# Patient Record
Sex: FEMALE | Race: WHITE | NOT HISPANIC OR LATINO | Employment: FULL TIME | URBAN - METROPOLITAN AREA
[De-identification: names, ages, dates, MRNs, and addresses within clinical notes are randomized per-mention and may not be internally consistent; named-entity substitution may affect disease eponyms.]

---

## 2017-01-06 ENCOUNTER — HOSPITAL ENCOUNTER (OUTPATIENT)
Dept: ULTRASOUND IMAGING | Facility: HOSPITAL | Age: 37
Discharge: HOME | End: 2017-01-06
Attending: FAMILY MEDICINE | Admitting: FAMILY MEDICINE

## 2017-01-06 DIAGNOSIS — E04.2 MULTIPLE THYROID NODULES: ICD-10-CM

## 2017-01-06 PROCEDURE — 76536 US EXAM OF HEAD AND NECK: CPT

## 2017-03-17 ENCOUNTER — OFFICE VISIT (OUTPATIENT)
Dept: FAMILY MEDICINE CLINIC | Facility: CLINIC | Age: 37
End: 2017-03-17

## 2017-03-17 VITALS
HEIGHT: 68 IN | RESPIRATION RATE: 18 BRPM | HEART RATE: 88 BPM | WEIGHT: 193 LBS | TEMPERATURE: 98.7 F | OXYGEN SATURATION: 98 % | SYSTOLIC BLOOD PRESSURE: 108 MMHG | BODY MASS INDEX: 29.25 KG/M2 | DIASTOLIC BLOOD PRESSURE: 62 MMHG

## 2017-03-17 DIAGNOSIS — J06.9 UPPER RESPIRATORY TRACT INFECTION, UNSPECIFIED TYPE: Primary | ICD-10-CM

## 2017-03-17 PROCEDURE — 99213 OFFICE O/P EST LOW 20 MIN: CPT | Performed by: NURSE PRACTITIONER

## 2017-03-17 RX ORDER — AZITHROMYCIN 250 MG/1
TABLET, FILM COATED ORAL
Qty: 6 TABLET | Refills: 0 | Status: SHIPPED | OUTPATIENT
Start: 2017-03-17 | End: 2017-05-26

## 2017-03-17 NOTE — PROGRESS NOTES
Subjective   Ni Rodríguez is a 36 y.o. female.     Chief Complaint   Patient presents with   • URI     x 3 weeks, cough, congestion, t/j pain, fatigue, drainage     Social History   Substance Use Topics   • Smoking status: Never Smoker   • Smokeless tobacco: Not on file   • Alcohol use Yes       URI    This is a new problem. The current episode started 1 to 4 weeks ago (3 weeks). The problem has been unchanged. There has been no fever. Associated symptoms include congestion, coughing (productive-yellow), headaches, a plugged ear sensation, sinus pain (maxillary) and a sore throat. Pertinent negatives include no abdominal pain, ear pain, nausea, vomiting or wheezing. Associated symptoms comments: Teeth hurt  . She has tried decongestant and acetaminophen for the symptoms. The treatment provided mild relief.     Review of Systems   Constitutional: Positive for fatigue. Negative for chills and fever.   HENT: Positive for congestion, sinus pressure and sore throat. Negative for ear pain.    Respiratory: Positive for cough (productive-yellow) and shortness of breath (winded easily). Negative for wheezing.    Gastrointestinal: Negative for abdominal pain, nausea and vomiting.   Neurological: Positive for headaches.   All other systems reviewed and are negative.    Physical Exam   Constitutional: She is oriented to person, place, and time. She appears well-developed and well-nourished.   HENT:   Head: Normocephalic and atraumatic.   Right Ear: External ear normal. No tenderness. Tympanic membrane is bulging. Tympanic membrane is not erythematous.   Left Ear: External ear normal. No tenderness. Tympanic membrane is bulging. Tympanic membrane is not erythematous.   Mouth/Throat: Oropharynx is clear and moist.   Neck: Normal range of motion. Neck supple.   Cardiovascular: Normal rate, regular rhythm and normal heart sounds.    Pulmonary/Chest: Effort normal and breath sounds normal.   Musculoskeletal: Normal range of motion.  "  Neurological: She is alert and oriented to person, place, and time.   Skin: Skin is warm and dry.   Psychiatric: She has a normal mood and affect. Her behavior is normal. Judgment and thought content normal.   Nursing note and vitals reviewed.       The following portions of the patient's history were reviewed and updated as appropriate: allergies, current medications,past medical hx, family hx, past social history an...    Chief Complaint   Patient presents with   • URI     x 3 weeks, cough, congestion, t/j pain, fatigue, drainage       Vitals:    03/17/17 1600   BP: 108/62   BP Location: Left arm   Patient Position: Sitting   Cuff Size: Adult   Pulse: 88   Resp: 18   Temp: 98.7 °F (37.1 °C)   SpO2: 98%   Weight: 193 lb (87.5 kg)   Height: 68\" (172.7 cm)       Assessment/Plan   Problems Addressed this Visit     None      Visit Diagnoses     Upper respiratory tract infection, unspecified type    -  Primary    Relevant Medications    azithromycin (ZITHROMAX) 250 MG tablet               Tylenol or Advil as needed for pain, fever, muscle aches  Plenty of fluids  Hand washing discussed  Off work or school note given if needed.  Warm tea for throat.      [facility]  Family Practice  [place], [place]  [facility]  "

## 2017-04-04 DIAGNOSIS — J40 BRONCHITIS: ICD-10-CM

## 2017-05-26 ENCOUNTER — OFFICE VISIT (OUTPATIENT)
Dept: FAMILY MEDICINE CLINIC | Facility: CLINIC | Age: 37
End: 2017-05-26

## 2017-05-26 VITALS
BODY MASS INDEX: 29.55 KG/M2 | RESPIRATION RATE: 18 BRPM | HEIGHT: 68 IN | SYSTOLIC BLOOD PRESSURE: 112 MMHG | WEIGHT: 195 LBS | HEART RATE: 95 BPM | DIASTOLIC BLOOD PRESSURE: 70 MMHG | OXYGEN SATURATION: 98 % | TEMPERATURE: 98.2 F

## 2017-05-26 DIAGNOSIS — J06.9 ACUTE URI: ICD-10-CM

## 2017-05-26 DIAGNOSIS — J02.9 SORE THROAT: Primary | ICD-10-CM

## 2017-05-26 LAB
EXPIRATION DATE: NORMAL
INTERNAL CONTROL: NORMAL
Lab: NORMAL
S PYO AG THROAT QL: NEGATIVE

## 2017-05-26 PROCEDURE — 99213 OFFICE O/P EST LOW 20 MIN: CPT | Performed by: NURSE PRACTITIONER

## 2017-05-26 PROCEDURE — 87880 STREP A ASSAY W/OPTIC: CPT | Performed by: NURSE PRACTITIONER

## 2017-05-26 RX ORDER — AMOXICILLIN AND CLAVULANATE POTASSIUM 875; 125 MG/1; MG/1
1 TABLET, FILM COATED ORAL 2 TIMES DAILY
Qty: 14 TABLET | Refills: 0 | Status: SHIPPED | OUTPATIENT
Start: 2017-05-26 | End: 2017-06-02

## 2017-08-14 ENCOUNTER — OFFICE VISIT (OUTPATIENT)
Dept: FAMILY MEDICINE CLINIC | Facility: CLINIC | Age: 37
End: 2017-08-14

## 2017-08-14 VITALS
OXYGEN SATURATION: 99 % | HEART RATE: 100 BPM | DIASTOLIC BLOOD PRESSURE: 74 MMHG | WEIGHT: 194.9 LBS | HEIGHT: 68 IN | TEMPERATURE: 98.3 F | BODY MASS INDEX: 29.54 KG/M2 | SYSTOLIC BLOOD PRESSURE: 118 MMHG

## 2017-08-14 DIAGNOSIS — R53.82 CHRONIC FATIGUE: ICD-10-CM

## 2017-08-14 DIAGNOSIS — E03.9 ACQUIRED HYPOTHYROIDISM: Primary | ICD-10-CM

## 2017-08-14 PROCEDURE — 99213 OFFICE O/P EST LOW 20 MIN: CPT | Performed by: FAMILY MEDICINE

## 2017-08-14 NOTE — PROGRESS NOTES
Subjective   Ni Rodríguez is a 37 y.o. female who is here for   Chief Complaint   Patient presents with   • Follow-up   • Hypothyroidism   • Fatigue   .     History of Present Illness   Hypothyroidism: Patient presents for evaluation of thyroid function. Symptoms consist of fatigue, weight gain. Symptoms have present for several months. The symptoms are mild.  The problem has been gradually worsening.  Previous thyroid studies include TSH and total T4. The hypothyroidism is due to hypothyroidism.  Newly Ds last yr. Her mother and brother with same.  She is taking a fruit/veggie supplement which also flushes colon.        The following portions of the patient's history were reviewed and updated as appropriate: allergies, current medications, past family history, past medical history, past social history, past surgical history and problem list.    Review of Systems    Objective   Physical Exam   Neck: No thyromegaly present.   Cardiovascular: Normal rate.    Pulmonary/Chest: Effort normal.   Nursing note and vitals reviewed.      Assessment/Plan   Ni was seen today for follow-up, hypothyroidism and fatigue.    Diagnoses and all orders for this visit:    Acquired hypothyroidism  -     TSH  -     T4, Free    Chronic fatigue  -     CBC (No Diff)  -     Comprehensive Metabolic Panel      There are no Patient Instructions on file for this visit.    There are no discontinued medications.     Return if symptoms worsen or fail to improve.    Dr. David Naylor  [place], [place]

## 2017-08-15 LAB
ALBUMIN SERPL-MCNC: 3.8 G/DL (ref 3.5–5.2)
ALBUMIN/GLOB SERPL: 1.1 G/DL
ALP SERPL-CCNC: 51 U/L (ref 40–129)
ALT SERPL-CCNC: 16 U/L (ref 5–33)
AST SERPL-CCNC: 17 U/L (ref 5–32)
BILIRUB SERPL-MCNC: 0.2 MG/DL (ref 0.2–1.2)
BUN SERPL-MCNC: 10 MG/DL (ref 6–20)
BUN/CREAT SERPL: 12.2 (ref 7–25)
CALCIUM SERPL-MCNC: 9.2 MG/DL (ref 8.6–10.5)
CHLORIDE SERPL-SCNC: 102 MMOL/L (ref 98–107)
CO2 SERPL-SCNC: 24.8 MMOL/L (ref 22–29)
CREAT SERPL-MCNC: 0.82 MG/DL (ref 0.57–1)
ERYTHROCYTE [DISTWIDTH] IN BLOOD BY AUTOMATED COUNT: 12.7 % (ref 11.5–14.5)
GLOBULIN SER CALC-MCNC: 3.4 GM/DL
GLUCOSE SERPL-MCNC: 131 MG/DL (ref 65–99)
HCT VFR BLD AUTO: 44.4 % (ref 37–47)
HGB BLD-MCNC: 14.2 G/DL (ref 12–16)
MCH RBC QN AUTO: 27.8 PG (ref 27–31)
MCHC RBC AUTO-ENTMCNC: 32 G/DL (ref 31–37)
MCV RBC AUTO: 86.9 FL (ref 81–99)
PLATELET # BLD AUTO: 246 10*3/MM3 (ref 140–500)
POTASSIUM SERPL-SCNC: 3.9 MMOL/L (ref 3.5–5.2)
PROT SERPL-MCNC: 7.2 G/DL (ref 6–8.5)
RBC # BLD AUTO: 5.11 10*6/MM3 (ref 4.2–5.4)
SODIUM SERPL-SCNC: 139 MMOL/L (ref 136–145)
T4 FREE SERPL-MCNC: 1.1 NG/DL (ref 0.93–1.7)
TSH SERPL DL<=0.005 MIU/L-ACNC: 1.06 MIU/ML (ref 0.27–4.2)
WBC # BLD AUTO: 9.43 10*3/MM3 (ref 4.8–10.8)

## 2017-10-06 ENCOUNTER — OFFICE VISIT (OUTPATIENT)
Dept: FAMILY MEDICINE CLINIC | Facility: CLINIC | Age: 37
End: 2017-10-06

## 2017-10-06 VITALS
RESPIRATION RATE: 18 BRPM | HEIGHT: 68 IN | WEIGHT: 194 LBS | SYSTOLIC BLOOD PRESSURE: 97 MMHG | DIASTOLIC BLOOD PRESSURE: 71 MMHG | HEART RATE: 94 BPM | BODY MASS INDEX: 29.4 KG/M2 | OXYGEN SATURATION: 98 %

## 2017-10-06 DIAGNOSIS — J40 BRONCHITIS: Primary | ICD-10-CM

## 2017-10-06 PROCEDURE — 99213 OFFICE O/P EST LOW 20 MIN: CPT | Performed by: NURSE PRACTITIONER

## 2017-10-06 RX ORDER — METHYLPREDNISOLONE 4 MG/1
TABLET ORAL
Qty: 21 EACH | Refills: 0 | Status: SHIPPED | OUTPATIENT
Start: 2017-10-06 | End: 2018-03-30

## 2017-10-06 RX ORDER — AZITHROMYCIN 250 MG/1
TABLET, FILM COATED ORAL
Qty: 6 TABLET | Refills: 0 | Status: SHIPPED | OUTPATIENT
Start: 2017-10-06 | End: 2018-03-30

## 2017-10-06 NOTE — PROGRESS NOTES
"Lisa Rodríguez is a 37 y.o. female.     Chief Complaint   Patient presents with   • URI     started with a sore throat, painful cough, chest fullness, non-productive cough     Social History   Substance Use Topics   • Smoking status: Never Smoker   • Smokeless tobacco: Never Used   • Alcohol use 0.6 oz/week     1 Glasses of wine per week       Cough   This is a new problem. The current episode started in the past 7 days (4-5). The problem has been gradually worsening. The problem occurs every few minutes. The cough is non-productive. Associated symptoms include headaches, a sore throat (worse in morning and night) and wheezing. Pertinent negatives include no chills, ear congestion, fever, nasal congestion, rhinorrhea or shortness of breath.     Review of Systems   Constitutional: Positive for fatigue. Negative for chills and fever.   HENT: Positive for sore throat (worse in morning and night). Negative for rhinorrhea and sinus pressure.    Respiratory: Positive for cough, chest tightness and wheezing. Negative for shortness of breath.    Skin: Negative.    Neurological: Positive for headaches.   All other systems reviewed and are negative.    Physical Exam   Gen: mildly ill appearing, alert  Ears: Tm's bulging without redness  Nose:  Congestion  Throat:  Red without exudate, some drainage, tonsils okay  Neck: no LAD  Lung: good air movement, regular RR  Heart: RR without murmur  Skin: no rash.    The following portions of the patient's history were reviewed and updated as appropriate: allergies, current medications,past medical hx, family hx, past social history an...    Chief Complaint   Patient presents with   • URI     started with a sore throat, painful cough, chest fullness, non-productive cough     cough    Vitals:    10/06/17 1551   BP: 97/71   BP Location: Left arm   Patient Position: Sitting   Cuff Size: Adult   Pulse: 94   Resp: 18   SpO2: 98%   Weight: 194 lb (88 kg)   Height: 68\" (172.7 cm) "         Assessment/Plan   Problems Addressed this Visit     None      Visit Diagnoses     Bronchitis    -  Primary    Relevant Medications    MethylPREDNISolone (MEDROL, BRADLY,) 4 MG tablet    azithromycin (ZITHROMAX) 250 MG tablet               Tylenol or Advil as needed for pain, fever, muscle aches  Plenty of fluids  Hand washing discussed  Off work or school note given if needed.  Warm tea for throat.      Lety DOE  Family Practice  [place], [place]  [facility]

## 2018-02-01 DIAGNOSIS — Z20.828 EXPOSURE TO THE FLU: Primary | ICD-10-CM

## 2018-02-01 RX ORDER — OSELTAMIVIR PHOSPHATE 75 MG/1
75 CAPSULE ORAL DAILY
Qty: 10 CAPSULE | Refills: 0 | Status: SHIPPED | OUTPATIENT
Start: 2018-02-01 | End: 2018-03-30

## 2018-03-03 DIAGNOSIS — E03.9 ACQUIRED HYPOTHYROIDISM: ICD-10-CM

## 2018-03-05 RX ORDER — LEVOTHYROXINE SODIUM 0.05 MG/1
TABLET ORAL
Qty: 90 TABLET | Refills: 0 | Status: SHIPPED | OUTPATIENT
Start: 2018-03-05 | End: 2018-06-01

## 2018-03-30 ENCOUNTER — OFFICE VISIT (OUTPATIENT)
Dept: FAMILY MEDICINE CLINIC | Facility: CLINIC | Age: 38
End: 2018-03-30

## 2018-03-30 VITALS
SYSTOLIC BLOOD PRESSURE: 110 MMHG | DIASTOLIC BLOOD PRESSURE: 72 MMHG | TEMPERATURE: 98.8 F | HEIGHT: 68 IN | HEART RATE: 96 BPM | WEIGHT: 198.8 LBS | BODY MASS INDEX: 30.13 KG/M2 | OXYGEN SATURATION: 98 %

## 2018-03-30 DIAGNOSIS — E03.9 ACQUIRED HYPOTHYROIDISM: Primary | ICD-10-CM

## 2018-03-30 LAB
T3 SERPL-MCNC: 159.4 NG/DL (ref 80–200)
T4 FREE SERPL-MCNC: 1.24 NG/DL (ref 0.93–1.7)
TSH SERPL DL<=0.005 MIU/L-ACNC: 3.19 MIU/ML (ref 0.27–4.2)

## 2018-03-30 PROCEDURE — 99213 OFFICE O/P EST LOW 20 MIN: CPT | Performed by: FAMILY MEDICINE

## 2018-03-30 RX ORDER — LEVOTHYROXINE SODIUM 50 MCG
50 TABLET ORAL DAILY
Qty: 30 TABLET | Refills: 5 | Status: SHIPPED | OUTPATIENT
Start: 2018-03-30 | End: 2018-09-25 | Stop reason: SDUPTHER

## 2018-03-30 NOTE — PROGRESS NOTES
Subjective   Ni Rodríguez is a 37 y.o. female who is here for   Chief Complaint   Patient presents with   • Fatigue   • Weight Gain   .     History of Present Illness   Hypothyroidism: Patient presents for evaluation of thyroid function. Symptoms consist of fatigue, weight gain, change in skin,  nails, or hair. Symptoms have present for 10 years. The symptoms are moderate.  The problem has been unchanged.  Previous thyroid studies include TSH and total T4. The hypothyroidism is due to hypothyroidism.  Despite normal TSH levels , still has daily fatigue and can not lose weight  Lets try brand Synthroid        The following portions of the patient's history were reviewed and updated as appropriate: allergies, current medications, past family history, past medical history, past social history and problem list.    Review of Systems    Objective   Physical Exam   Constitutional: She appears well-developed and well-nourished.   Neck: No thyromegaly present.   Cardiovascular: Normal rate.    Pulmonary/Chest: Effort normal.   Nursing note and vitals reviewed.      Assessment/Plan   Ni was seen today for fatigue and weight gain.    Diagnoses and all orders for this visit:    Acquired hypothyroidism  -     TSH  -     T4, Free  -     T3    Other orders  -     SYNTHROID 50 MCG tablet; Take 1 tablet by mouth Daily.      There are no Patient Instructions on file for this visit.    Medications Discontinued During This Encounter   Medication Reason   • azithromycin (ZITHROMAX) 250 MG tablet *Therapy completed   • MethylPREDNISolone (MEDROL, BRADLY,) 4 MG tablet *Therapy completed   • montelukast (SINGULAIR) 10 MG tablet *Therapy completed   • oseltamivir (TAMIFLU) 75 MG capsule *Therapy completed   • PROAIR  (90 BASE) MCG/ACT inhaler *Therapy completed        Return for Next scheduled follow up.    Dr. David Naylor  [place], [place]

## 2018-06-01 ENCOUNTER — OFFICE VISIT (OUTPATIENT)
Dept: FAMILY MEDICINE CLINIC | Facility: CLINIC | Age: 38
End: 2018-06-01

## 2018-06-01 VITALS
HEART RATE: 94 BPM | DIASTOLIC BLOOD PRESSURE: 70 MMHG | HEIGHT: 68 IN | WEIGHT: 195 LBS | OXYGEN SATURATION: 99 % | SYSTOLIC BLOOD PRESSURE: 120 MMHG | BODY MASS INDEX: 29.55 KG/M2 | TEMPERATURE: 97.6 F

## 2018-06-01 DIAGNOSIS — Z00.00 ROUTINE ADULT HEALTH MAINTENANCE: ICD-10-CM

## 2018-06-01 DIAGNOSIS — E03.9 ACQUIRED HYPOTHYROIDISM: Primary | ICD-10-CM

## 2018-06-01 PROCEDURE — 99395 PREV VISIT EST AGE 18-39: CPT | Performed by: FAMILY MEDICINE

## 2018-06-01 NOTE — PROGRESS NOTES
"  Chief Complaint   Patient presents with   • Annual Exam   • Circulatory Problem     in her legs been going on since Jan, Febr   • Hypothyroidism       Subjective   Ni Rodríguez is a 37 y.o. female and is here for a yearly physical exam. The patient reports problems - feet tingle pins and needles after sitting. finger tips get cold too.    We tried brand name Synthroid last ov for fatigue despite normal treated TSH levels.    Do you take any herbs or supplements that were not prescribed by a doctor? no. If so, these will be added to active medication list.    The following portions of the patient's history were reviewed and updated as appropriate: allergies, current medications, past family history, past medical history, past social history, past surgical history and problem list.    Social and Family and Surgical History reviewed and updated today, see Rooming tab.    Health History, Preventive Measures and Vaccination flow sheets reviewed and updated today.    Patient's current medical chart in Epic; including previous office notes, imaging, labs, specialist's evaluation either in notes or in Media tab reviewed today.    Other pertinent medical information also reviewed thru Care Everywhere function is also reviewed today.    Review of Systems  Review of Systems  A comprehensive review of systems was negative.    Vitals:    06/01/18 1303   BP: 120/70   BP Location: Left arm   Patient Position: Sitting   Pulse: 94   Temp: 97.6 °F (36.4 °C)   TempSrc: Oral   SpO2: 99%   Weight: 88.5 kg (195 lb)   Height: 172.7 cm (68\")       General Appearance:  Alert, cooperative, no distress, appears stated age   Head:  Normocephalic, without obvious abnormality, atraumatic   Eyes:  PERRL, conjunctiva/corneas clear, EOM's intact.   Ears:  Normal TM's and external ear canals, both ears   Nose: Nares normal, septum midline, mucosa normal, no drainage or sinus tenderness   Throat: Lips, mucosa, and tongue normal; teeth and gums " normal   Neck: Supple, symmetrical, trachea midline, no adenopathy;   thyroid: No enlargement/tenderness/nodules; no carotid  bruit   Back:  Symmetric, no curvature, ROM normal, no CVA tenderness   Lungs:  Clear to auscultation bilaterally, respirations unlabored   Chest wall:  No tenderness or deformity   Heart:  Regular rate and rhythm, S1 and S2 normal, no murmur, rub or gallop   Abdomen:  Soft, non-tender, bowel sounds active all four quadrants,   no masses, no organomegaly   Rectal:        Extremities: Extremities normal, atraumatic, no cyanosis or edema   Pulses: 2+ and symmetric all extremities   Skin: Skin color, texture, turgor normal, no rashes or lesions   Lymph nodes: Cervical, supraclavicular, and axillary nodes normal   Neurologic: CNII-XII intact. Normal strength, sensation and reflexes   throughout          Results for orders placed or performed in visit on 03/30/18   TSH   Result Value Ref Range    TSH 3.190 0.270 - 4.200 mIU/mL   T4, Free   Result Value Ref Range    Free T4 1.24 0.93 - 1.70 ng/dL   T3   Result Value Ref Range    T3, Total 159.4 80.0 - 200.0 ng/dl     Assessment/Plan   Healthy female exam.  Ni was seen today for annual exam, circulatory problem and hypothyroidism.    Diagnoses and all orders for this visit:    Acquired hypothyroidism  -     TSH; Future  -     T4, Free; Future    Routine adult health maintenance      1. Will stick with brand name Synthroid  2. Patient Counseling:  --Nutrition: Stressed importance of moderation in sodium/caffeine intake, saturated fat and cholesterol.  Discussed caloric balance, sufficient intake of fresh fruits, vegetables, fiber, calcium, iron.ok  --  --Exercise: Stressed the importance of regular exercise. restart  --Substance Abuse: Discussed cessation/primary prevention of tobacco, alcohol, or other drug use; driving or other dangerous activities under the influence. ok   --Dental health: Discussed importance of regular tooth brushing,  flossing, and dental visits.utd  -- suggested having eyes and vision checked if needed or past due.utd , wears contacts  --Immunizations reviewed.ok  --current with GYN  3. Discussed the patient's BMI with her.  The BMI is above average; BMI management plan is completed  4. Follow up in 6 months    There are no Patient Instructions on file for this visit.    Medications Discontinued During This Encounter   Medication Reason   • levothyroxine (SYNTHROID, LEVOTHROID) 50 MCG tablet *Therapy completed        Dr. David Naylor MD  [place], [place]  [facility]

## 2018-09-26 RX ORDER — LEVOTHYROXINE SODIUM 50 MCG
50 TABLET ORAL DAILY
Qty: 90 TABLET | Refills: 0 | Status: SHIPPED | OUTPATIENT
Start: 2018-09-26 | End: 2018-12-13 | Stop reason: SDUPTHER

## 2018-10-15 ENCOUNTER — TELEPHONE (OUTPATIENT)
Dept: FAMILY MEDICINE CLINIC | Facility: CLINIC | Age: 38
End: 2018-10-15

## 2018-11-01 ENCOUNTER — RESULTS ENCOUNTER (OUTPATIENT)
Dept: FAMILY MEDICINE CLINIC | Facility: CLINIC | Age: 38
End: 2018-11-01

## 2018-11-01 DIAGNOSIS — E03.9 ACQUIRED HYPOTHYROIDISM: ICD-10-CM

## 2018-12-13 RX ORDER — LEVOTHYROXINE SODIUM 50 MCG
TABLET ORAL
Qty: 90 TABLET | Refills: 0 | Status: SHIPPED | OUTPATIENT
Start: 2018-12-13 | End: 2018-12-26 | Stop reason: SDUPTHER

## 2018-12-24 DIAGNOSIS — E03.9 ACQUIRED HYPOTHYROIDISM: Primary | ICD-10-CM

## 2018-12-26 RX ORDER — LEVOTHYROXINE SODIUM 50 MCG
50 TABLET ORAL DAILY
Qty: 90 TABLET | Refills: 0 | Status: SHIPPED | OUTPATIENT
Start: 2018-12-26 | End: 2019-06-02 | Stop reason: SDUPTHER

## 2018-12-27 LAB — TSH SERPL DL<=0.005 MIU/L-ACNC: 1.34 MIU/ML (ref 0.27–4.2)

## 2019-01-02 ENCOUNTER — OFFICE VISIT (OUTPATIENT)
Dept: FAMILY MEDICINE CLINIC | Facility: CLINIC | Age: 39
End: 2019-01-02

## 2019-01-02 VITALS
RESPIRATION RATE: 16 BRPM | HEART RATE: 75 BPM | HEIGHT: 68 IN | DIASTOLIC BLOOD PRESSURE: 70 MMHG | WEIGHT: 190 LBS | SYSTOLIC BLOOD PRESSURE: 118 MMHG | OXYGEN SATURATION: 99 % | TEMPERATURE: 98.6 F | BODY MASS INDEX: 28.79 KG/M2

## 2019-01-02 DIAGNOSIS — Z13.220 SCREENING FOR LIPID DISORDERS: ICD-10-CM

## 2019-01-02 DIAGNOSIS — Z23 NEED FOR INFLUENZA VACCINATION: ICD-10-CM

## 2019-01-02 DIAGNOSIS — R53.82 CHRONIC FATIGUE: Primary | ICD-10-CM

## 2019-01-02 DIAGNOSIS — E03.9 ACQUIRED HYPOTHYROIDISM: ICD-10-CM

## 2019-01-02 PROBLEM — M22.2X1 PATELLOFEMORAL PAIN SYNDROME OF RIGHT KNEE: Status: ACTIVE | Noted: 2018-09-20

## 2019-01-02 PROCEDURE — 90471 IMMUNIZATION ADMIN: CPT | Performed by: PHYSICIAN ASSISTANT

## 2019-01-02 PROCEDURE — 90674 CCIIV4 VAC NO PRSV 0.5 ML IM: CPT | Performed by: PHYSICIAN ASSISTANT

## 2019-01-02 PROCEDURE — 99213 OFFICE O/P EST LOW 20 MIN: CPT | Performed by: PHYSICIAN ASSISTANT

## 2019-01-02 NOTE — PROGRESS NOTES
Subjective   Ni Rodríguez is a 38 y.o. female presents for   Chief Complaint   Patient presents with   • Hypothyroidism     management       History of Present Illness     Ni is a 38-year-old female who presents for hypothyroidism management.  She has lost 5 pounds since June 1, 2018. She has lost 5 pounds since June 2018.  She is now taking name brand Synthroid daily.  Diet has been healthy.  Sleep has been normal.  She has been exercising 2-4 times a week.  She takes Juice Plus daily.  Ni states that she has had chronic fatigue symptoms for more than 6 months.  Not any chest pain, shortness of air, wheezing, fevers, chills, abdominal pain or swelling of ankles.  She doesn't snore or have sleep apnea. Ni sees Dr. Lorenz for her gyn needs. Bowel movements have been daily without dark black tarry stools.  Ni states she would like to have the flu shot at office visit today.  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  Denied any fevers, chills, or upper respiratory symptoms.     The following portions of the patient's history were reviewed and updated as appropriate: allergies, current medications, past family history, past medical history, past social history, past surgical history and problem list.    Review of Systems   Constitutional: Positive for fatigue. Negative for chills and fever.   HENT: Negative.    Eyes: Negative.    Respiratory: Negative.  Negative for cough, shortness of breath and wheezing.    Cardiovascular: Negative.  Negative for chest pain, palpitations and leg swelling.   Gastrointestinal: Negative.    Endocrine: Negative.    Genitourinary: Negative.    Musculoskeletal: Negative.    Skin: Negative.    Allergic/Immunologic: Negative.    Neurological: Negative.    Hematological: Negative.    Psychiatric/Behavioral: Negative.  Negative for sleep disturbance and suicidal ideas.   All other systems reviewed and are negative.        Vitals:    01/02/19 1436   BP:  "118/70   BP Location: Left arm   Patient Position: Sitting   Cuff Size: Adult   Pulse: 75   Resp: 16   Temp: 98.6 °F (37 °C)   TempSrc: Oral   SpO2: 99%   Weight: 86.2 kg (190 lb)   Height: 172.7 cm (68\")     Wt Readings from Last 3 Encounters:   01/02/19 86.2 kg (190 lb)   06/01/18 88.5 kg (195 lb)   03/30/18 90.2 kg (198 lb 12.8 oz)       BP Readings from Last 3 Encounters:   01/02/19 118/70   06/01/18 120/70   03/30/18 110/72     Social History     Socioeconomic History   • Marital status:      Spouse name: Not on file   • Number of children: 2   • Years of education: masters in education   • Highest education level: Not on file   Social Needs   • Financial resource strain: Not on file   • Food insecurity - worry: Not on file   • Food insecurity - inability: Not on file   • Transportation needs - medical: Not on file   • Transportation needs - non-medical: Not on file   Occupational History   • Occupation: teacher     Employer: [employer]   Tobacco Use   • Smoking status: Never Smoker   • Smokeless tobacco: Never Used   Substance and Sexual Activity   • Alcohol use: Yes     Alcohol/week: 0.6 oz     Types: 1 Glasses of wine per week   • Drug use: No   • Sexual activity: Yes     Partners: Male     Birth control/protection: OCP   Other Topics Concern   • Not on file   Social History Narrative   • Not on file       No Known Allergies    Body mass index is 28.89 kg/m².    Objective   Physical Exam   Constitutional: She is oriented to person, place, and time. Vital signs are normal. She appears well-developed and well-nourished.   HENT:   Head: Normocephalic and atraumatic.   Right Ear: Hearing, tympanic membrane, external ear and ear canal normal.   Left Ear: Hearing, tympanic membrane, external ear and ear canal normal.   Nose: Nose normal. Right sinus exhibits no maxillary sinus tenderness and no frontal sinus tenderness. Left sinus exhibits no maxillary sinus tenderness and no frontal sinus tenderness. "   Mouth/Throat: Uvula is midline, oropharynx is clear and moist and mucous membranes are normal.   Eyes: Conjunctivae, EOM and lids are normal. Pupils are equal, round, and reactive to light.   Neck: Trachea normal and phonation normal. Neck supple. No edema present. No thyroid mass and no thyromegaly present.   Cardiovascular: Normal rate, regular rhythm, S1 normal, S2 normal, normal heart sounds and normal pulses.   Pulmonary/Chest: Effort normal and breath sounds normal.   Abdominal: Soft. Normal appearance, normal aorta and bowel sounds are normal. There is no hepatomegaly. There is no tenderness.   Lymphadenopathy:     She has no cervical adenopathy.   Neurological: She is alert and oriented to person, place, and time.   Skin: Skin is warm, dry and intact. Capillary refill takes less than 2 seconds.   Psychiatric: She has a normal mood and affect. Her speech is normal and behavior is normal. Judgment and thought content normal. Cognition and memory are normal.       Assessment/Plan   Ni was seen today for hypothyroidism.    Diagnoses and all orders for this visit:    Chronic fatigue  -     CBC & Differential; Future  -     Comprehensive Metabolic Panel; Future  -     Vitamin D 25 Hydroxy; Future  -     Vitamin B12; Future  -     Folate; Future  -     Fabiola-Barr Virus VCA Antibody Panel; Future    Acquired hypothyroidism    Need for influenza vaccination  -     Flucelvax Quad=>4Years (Vial)    Screening for lipid disorders  -     Lipid Panel With LDL / HDL Ratio; Future      1.  Chronic and stable hypothyroidism: I have reviewed her TSH results with her at office visit today.  She is in therapeutic range at 1.34.  She will continue taking her name brand/medically necessary Synthroid medication at home.  Plan to repeat TSH in 3-6 months.  2.  New fatigue: Ni has been having fatigue symptoms for the past 9-10 months.  She'll return to office this week for fasting blood work that include CBC, CMP, vitamin D  level, vitamin B12, folate and Fabiola-Barr virus testing.  She'll be notified of test results when completed.  3.  Need for screening lipid profile: She has a family history of cholesterol issues.  She'll return to office this week for fasting lipid profile.  She'll be notified of test results when completed.  Ni was encouraged to decrease her fatty food intake and to increase physical activity.  4.  Need for influenza vaccination: Ni has given written consent to receive the flu immunization at office visit today.    Miri Deshpande PA-C    [facility]  [address] [place] [id number]  Dept: [phone]  Dept Fax: [phone]  Loc: [phone]  Loc Fax: [phone]        Orders Only on 12/24/2018   Component Date Value Ref Range Status   • TSH 12/27/2018 1.340  0.270 - 4.200 mIU/mL Final

## 2019-01-04 ENCOUNTER — RESULTS ENCOUNTER (OUTPATIENT)
Dept: FAMILY MEDICINE CLINIC | Facility: CLINIC | Age: 39
End: 2019-01-04

## 2019-01-04 DIAGNOSIS — Z13.220 SCREENING FOR LIPID DISORDERS: ICD-10-CM

## 2019-01-04 DIAGNOSIS — R53.82 CHRONIC FATIGUE: ICD-10-CM

## 2019-01-08 LAB
25(OH)D3+25(OH)D2 SERPL-MCNC: 53.2 NG/ML
ALBUMIN SERPL-MCNC: 4.3 G/DL (ref 3.5–5.2)
ALBUMIN/GLOB SERPL: 1.4 G/DL
ALP SERPL-CCNC: 60 U/L (ref 40–129)
ALT SERPL-CCNC: 11 U/L (ref 5–33)
AST SERPL-CCNC: 11 U/L (ref 5–32)
BASOPHILS # BLD AUTO: 0.04 10*3/MM3 (ref 0–0.2)
BASOPHILS NFR BLD AUTO: 0.4 % (ref 0–2)
BILIRUB SERPL-MCNC: 0.4 MG/DL (ref 0.2–1.2)
BUN SERPL-MCNC: 13 MG/DL (ref 6–20)
BUN/CREAT SERPL: 17.6 (ref 7–25)
CALCIUM SERPL-MCNC: 9.4 MG/DL (ref 8.6–10.5)
CHLORIDE SERPL-SCNC: 104 MMOL/L (ref 98–107)
CHOLEST SERPL-MCNC: 176 MG/DL (ref 0–200)
CO2 SERPL-SCNC: 24.8 MMOL/L (ref 22–29)
CREAT SERPL-MCNC: 0.74 MG/DL (ref 0.57–1)
EBV EA IGG SER-ACNC: 13.2 U/ML (ref 0–8.9)
EBV NA IGG SER IA-ACNC: 119 U/ML (ref 0–17.9)
EBV VCA IGG SER IA-ACNC: 76.5 U/ML (ref 0–17.9)
EBV VCA IGM SER IA-ACNC: <36 U/ML (ref 0–35.9)
EOSINOPHIL # BLD AUTO: 0.38 10*3/MM3 (ref 0.1–0.3)
EOSINOPHIL NFR BLD AUTO: 4.1 % (ref 0–4)
ERYTHROCYTE [DISTWIDTH] IN BLOOD BY AUTOMATED COUNT: 12.3 % (ref 11.5–14.5)
FOLATE SERPL-MCNC: 12.36 NG/ML (ref 4.78–20)
GLOBULIN SER CALC-MCNC: 3 GM/DL
GLUCOSE SERPL-MCNC: 84 MG/DL (ref 65–99)
HCT VFR BLD AUTO: 44 % (ref 37–47)
HDLC SERPL-MCNC: 73 MG/DL (ref 40–60)
HGB BLD-MCNC: 13.9 G/DL (ref 12–16)
IMM GRANULOCYTES # BLD AUTO: 0.04 10*3/MM3 (ref 0–0.03)
IMM GRANULOCYTES NFR BLD AUTO: 0.4 % (ref 0–0.5)
LDLC SERPL CALC-MCNC: 72 MG/DL (ref 0–100)
LDLC/HDLC SERPL: 0.99 {RATIO}
LYMPHOCYTES # BLD AUTO: 2.91 10*3/MM3 (ref 0.6–4.8)
LYMPHOCYTES NFR BLD AUTO: 31.1 % (ref 20–45)
MCH RBC QN AUTO: 28.6 PG (ref 27–31)
MCHC RBC AUTO-ENTMCNC: 31.6 G/DL (ref 31–37)
MCV RBC AUTO: 90.5 FL (ref 81–99)
MONOCYTES # BLD AUTO: 0.82 10*3/MM3 (ref 0–1)
MONOCYTES NFR BLD AUTO: 8.8 % (ref 3–8)
NEUTROPHILS # BLD AUTO: 5.17 10*3/MM3 (ref 1.5–8.3)
NEUTROPHILS NFR BLD AUTO: 55.2 % (ref 45–70)
NRBC BLD AUTO-RTO: 0 /100 WBC (ref 0–0)
PLATELET # BLD AUTO: 239 10*3/MM3 (ref 140–500)
POTASSIUM SERPL-SCNC: 4.7 MMOL/L (ref 3.5–5.2)
PROT SERPL-MCNC: 7.3 G/DL (ref 6–8.5)
RBC # BLD AUTO: 4.86 10*6/MM3 (ref 4.2–5.4)
SERVICE CMNT-IMP: ABNORMAL
SODIUM SERPL-SCNC: 141 MMOL/L (ref 136–145)
TRIGL SERPL-MCNC: 154 MG/DL (ref 0–150)
VIT B12 SERPL-MCNC: 227 PG/ML
VLDLC SERPL CALC-MCNC: 30.8 MG/DL (ref 7–27)
WBC # BLD AUTO: 9.36 10*3/MM3 (ref 4.8–10.8)

## 2019-03-29 ENCOUNTER — OFFICE VISIT (OUTPATIENT)
Dept: FAMILY MEDICINE CLINIC | Facility: CLINIC | Age: 39
End: 2019-03-29

## 2019-03-29 VITALS
TEMPERATURE: 98.2 F | OXYGEN SATURATION: 98 % | BODY MASS INDEX: 28.79 KG/M2 | WEIGHT: 190 LBS | DIASTOLIC BLOOD PRESSURE: 65 MMHG | SYSTOLIC BLOOD PRESSURE: 100 MMHG | RESPIRATION RATE: 16 BRPM | HEART RATE: 94 BPM | HEIGHT: 68 IN

## 2019-03-29 DIAGNOSIS — J20.9 ACUTE BRONCHITIS, UNSPECIFIED ORGANISM: ICD-10-CM

## 2019-03-29 DIAGNOSIS — J06.9 ACUTE URI: Primary | ICD-10-CM

## 2019-03-29 PROCEDURE — 99213 OFFICE O/P EST LOW 20 MIN: CPT | Performed by: PHYSICIAN ASSISTANT

## 2019-03-29 RX ORDER — METHYLPREDNISOLONE 4 MG/1
TABLET ORAL
Qty: 21 TABLET | Refills: 0 | Status: SHIPPED | OUTPATIENT
Start: 2019-03-29 | End: 2020-02-28

## 2019-03-29 RX ORDER — ALBUTEROL SULFATE 90 UG/1
2 AEROSOL, METERED RESPIRATORY (INHALATION) EVERY 4 HOURS PRN
Qty: 18 G | Refills: 0 | Status: SHIPPED | OUTPATIENT
Start: 2019-03-29 | End: 2020-03-29

## 2019-03-29 RX ORDER — AZITHROMYCIN 250 MG/1
TABLET, FILM COATED ORAL
Qty: 6 TABLET | Refills: 0 | Status: SHIPPED | OUTPATIENT
Start: 2019-03-29 | End: 2020-02-28

## 2019-03-29 NOTE — PROGRESS NOTES
Subjective   Ni Rodríguez is a 38 y.o. female presents for   Chief Complaint   Patient presents with   • Cough     x1 week   • Nasal Congestion   • Sore Throat       History of Present Illness     Ni is a 38-year-old female who presents with sore throat, nasal congestion,dry cough,sinus pressure,sneezing,fatigue,stuffy nose to clear rhinorrhea,ear pressure,and scratchy throat for the past 7-10 days.  Denied any fevers,chills,nausea,vomiting,diarrhea,shortness of air,or wheezing.  Ni has used OTC Nyquil and Mucinex/Nyquil with slight relief. She is leaving for [facility] on Sunday.  Appetite has been decreased.  Sleep has been normal with Nyquil.  Lat LMP was March 15,2019.    The following portions of the patient's history were reviewed and updated as appropriate: allergies, current medications, past family history, past medical history, past social history, past surgical history and problem list.    Review of Systems   Constitutional: Negative.  Negative for chills, fatigue and fever.   HENT: Positive for congestion, ear pain, postnasal drip, rhinorrhea, sinus pressure, sinus pain and sore throat.    Eyes: Negative.    Respiratory: Positive for cough. Negative for shortness of breath and wheezing.    Cardiovascular: Negative.  Negative for chest pain, palpitations and leg swelling.   Gastrointestinal: Negative.  Negative for constipation, diarrhea, nausea and vomiting.   Endocrine: Negative.    Genitourinary: Negative.    Musculoskeletal: Positive for myalgias.   Skin: Negative.    Allergic/Immunologic: Negative.    Neurological: Positive for headaches. Negative for dizziness and light-headedness.   Hematological: Negative.    Psychiatric/Behavioral: Negative.  Negative for sleep disturbance and suicidal ideas.   All other systems reviewed and are negative.        Vitals:    03/29/19 1416   BP: 100/65   Pulse: 94   Resp: 16   Temp: 98.2 °F (36.8 °C)   SpO2: 98%   Weight: 86.2 kg (190 lb)   Height: 172.7  "cm (68\")     Wt Readings from Last 3 Encounters:   03/29/19 86.2 kg (190 lb)   01/02/19 86.2 kg (190 lb)   06/01/18 88.5 kg (195 lb)     BP Readings from Last 3 Encounters:   03/29/19 100/65   01/02/19 118/70   06/01/18 120/70     Social History     Socioeconomic History   • Marital status:      Spouse name: Not on file   • Number of children: 2   • Years of education: masters in education   • Highest education level: Not on file   Occupational History   • Occupation: teacher     Employer: [employer]   Tobacco Use   • Smoking status: Never Smoker   • Smokeless tobacco: Never Used   Substance and Sexual Activity   • Alcohol use: Yes     Alcohol/week: 0.6 oz     Types: 1 Glasses of wine per week   • Drug use: No   • Sexual activity: Yes     Partners: Male     Birth control/protection: OCP       No Known Allergies    Body mass index is 28.89 kg/m².    Objective   Physical Exam   Constitutional: She is oriented to person, place, and time. Vital signs are normal. She appears well-developed and well-nourished.   HENT:   Head: Normocephalic and atraumatic.   Right Ear: Hearing, tympanic membrane, external ear and ear canal normal.   Left Ear: Hearing, external ear and ear canal normal. Tympanic membrane is bulging.   Nose: Rhinorrhea present. Right sinus exhibits no maxillary sinus tenderness and no frontal sinus tenderness. Left sinus exhibits no maxillary sinus tenderness and no frontal sinus tenderness.   Mouth/Throat: Uvula is midline, oropharynx is clear and moist and mucous membranes are normal.   Eyes: Conjunctivae, EOM and lids are normal. Pupils are equal, round, and reactive to light.   Neck: Trachea normal and phonation normal. Neck supple. No edema present.   Cardiovascular: Normal rate, regular rhythm, S1 normal, S2 normal, normal heart sounds and normal pulses.   Pulmonary/Chest: Effort normal. She has wheezes in the left upper field.   Abdominal: Soft. Normal appearance, normal aorta and bowel " sounds are normal. There is no hepatomegaly. There is no tenderness.   Lymphadenopathy:     She has cervical adenopathy (shotty).        Right cervical: Superficial cervical adenopathy present.        Left cervical: Superficial cervical adenopathy present.   Neurological: She is alert and oriented to person, place, and time.   Skin: Skin is warm, dry and intact. Capillary refill takes less than 2 seconds.   Psychiatric: She has a normal mood and affect. Her speech is normal and behavior is normal. Judgment and thought content normal. Cognition and memory are normal.       Assessment/Plan   Ni was seen today for cough, nasal congestion and sore throat.    Diagnoses and all orders for this visit:    Acute URI  -     azithromycin (ZITHROMAX Z-BRADLY) 250 MG tablet; Take 2 tablets the first day, then 1 tablet daily for 4 days.  -     methylPREDNISolone (MEDROL, BRADLY,) 4 MG tablet; Take as directed on package instructions.  -     albuterol sulfate  (90 Base) MCG/ACT inhaler; Inhale 2 puffs Every 4 (Four) Hours As Needed for Wheezing or Shortness of Air.    Acute bronchitis, unspecified organism  -     azithromycin (ZITHROMAX Z-BRADLY) 250 MG tablet; Take 2 tablets the first day, then 1 tablet daily for 4 days.  -     methylPREDNISolone (MEDROL, BRADLY,) 4 MG tablet; Take as directed on package instructions.  -     albuterol sulfate  (90 Base) MCG/ACT inhaler; Inhale 2 puffs Every 4 (Four) Hours As Needed for Wheezing or Shortness of Air.    Mrs. Ni Rodríguez was seen in office today and was diagnosed with new onset acute upper respiratory infection and bronchitis.  I prescribed a Z-Bradly, Medrol Dosepak and albuterol inhaler.  She may continue using her Mucinex and Nyquil at home.  Ni was instructed to increase fluid intake and rest as much as possible.      BELINDA Munoz [facility]  [address] [place] [id number]  Dept:  [phone]  Dept Fax: [phone]  Loc: [phone]  Loc Fax: [phone]

## 2019-06-03 RX ORDER — LEVOTHYROXINE SODIUM 50 MCG
50 TABLET ORAL DAILY
Qty: 90 TABLET | Refills: 2 | Status: SHIPPED | OUTPATIENT
Start: 2019-06-03 | End: 2020-03-16

## 2019-06-05 DIAGNOSIS — Z00.00 ROUTINE ADULT HEALTH MAINTENANCE: ICD-10-CM

## 2019-06-05 DIAGNOSIS — E03.9 ACQUIRED HYPOTHYROIDISM: Primary | ICD-10-CM

## 2019-06-05 DIAGNOSIS — E53.8 VITAMIN B 12 DEFICIENCY: ICD-10-CM

## 2019-06-05 DIAGNOSIS — Z13.220 SCREENING FOR LIPID DISORDERS: ICD-10-CM

## 2019-06-05 LAB
ALBUMIN SERPL-MCNC: 3.9 G/DL (ref 3.5–5.2)
ALBUMIN/GLOB SERPL: 1.4 G/DL
ALP SERPL-CCNC: 52 U/L (ref 39–117)
ALT SERPL-CCNC: 10 U/L (ref 1–33)
AST SERPL-CCNC: 12 U/L (ref 1–32)
BASOPHILS # BLD AUTO: 0.04 10*3/MM3 (ref 0–0.2)
BASOPHILS NFR BLD AUTO: 0.6 % (ref 0–1.5)
BILIRUB SERPL-MCNC: 0.3 MG/DL (ref 0.2–1.2)
BUN SERPL-MCNC: 11 MG/DL (ref 6–20)
BUN/CREAT SERPL: 13.4 (ref 7–25)
CALCIUM SERPL-MCNC: 9.7 MG/DL (ref 8.6–10.5)
CHLORIDE SERPL-SCNC: 107 MMOL/L (ref 98–107)
CHOLEST SERPL-MCNC: 165 MG/DL (ref 0–200)
CHOLEST/HDLC SERPL: 2.2 {RATIO}
CO2 SERPL-SCNC: 24.8 MMOL/L (ref 22–29)
CREAT SERPL-MCNC: 0.82 MG/DL (ref 0.57–1)
EOSINOPHIL # BLD AUTO: 0.43 10*3/MM3 (ref 0–0.4)
EOSINOPHIL NFR BLD AUTO: 5.9 % (ref 0.3–6.2)
ERYTHROCYTE [DISTWIDTH] IN BLOOD BY AUTOMATED COUNT: 12.8 % (ref 12.3–15.4)
GLOBULIN SER CALC-MCNC: 2.7 GM/DL
GLUCOSE SERPL-MCNC: 92 MG/DL (ref 65–99)
HCT VFR BLD AUTO: 42.6 % (ref 34–46.6)
HDLC SERPL-MCNC: 75 MG/DL (ref 40–60)
HGB BLD-MCNC: 13.2 G/DL (ref 12–15.9)
IMM GRANULOCYTES # BLD AUTO: 0.04 10*3/MM3 (ref 0–0.05)
IMM GRANULOCYTES NFR BLD AUTO: 0.6 % (ref 0–0.5)
LDLC SERPL CALC-MCNC: 77 MG/DL (ref 0–100)
LYMPHOCYTES # BLD AUTO: 2.44 10*3/MM3 (ref 0.7–3.1)
LYMPHOCYTES NFR BLD AUTO: 33.7 % (ref 19.6–45.3)
MCH RBC QN AUTO: 28.8 PG (ref 26.6–33)
MCHC RBC AUTO-ENTMCNC: 31 G/DL (ref 31.5–35.7)
MCV RBC AUTO: 93 FL (ref 79–97)
MONOCYTES # BLD AUTO: 0.62 10*3/MM3 (ref 0.1–0.9)
MONOCYTES NFR BLD AUTO: 8.6 % (ref 5–12)
NEUTROPHILS # BLD AUTO: 3.67 10*3/MM3 (ref 1.7–7)
NEUTROPHILS NFR BLD AUTO: 50.6 % (ref 42.7–76)
NRBC BLD AUTO-RTO: 0 /100 WBC (ref 0–0.2)
PLATELET # BLD AUTO: 215 10*3/MM3 (ref 140–450)
POTASSIUM SERPL-SCNC: 4.5 MMOL/L (ref 3.5–5.2)
PROT SERPL-MCNC: 6.6 G/DL (ref 6–8.5)
RBC # BLD AUTO: 4.58 10*6/MM3 (ref 3.77–5.28)
SODIUM SERPL-SCNC: 142 MMOL/L (ref 136–145)
TRIGL SERPL-MCNC: 67 MG/DL (ref 0–150)
TSH SERPL DL<=0.005 MIU/L-ACNC: 1.98 MIU/ML (ref 0.27–4.2)
VIT B12 SERPL-MCNC: 515 PG/ML (ref 211–946)
VLDLC SERPL CALC-MCNC: 13.4 MG/DL
WBC # BLD AUTO: 7.24 10*3/MM3 (ref 3.4–10.8)

## 2020-02-28 ENCOUNTER — OFFICE VISIT (OUTPATIENT)
Dept: FAMILY MEDICINE CLINIC | Facility: CLINIC | Age: 40
End: 2020-02-28

## 2020-02-28 ENCOUNTER — HOSPITAL ENCOUNTER (OUTPATIENT)
Dept: CT IMAGING | Facility: HOSPITAL | Age: 40
Discharge: HOME | End: 2020-02-28
Admitting: PHYSICIAN ASSISTANT

## 2020-02-28 VITALS
TEMPERATURE: 98.4 F | BODY MASS INDEX: 28.79 KG/M2 | HEART RATE: 89 BPM | RESPIRATION RATE: 16 BRPM | SYSTOLIC BLOOD PRESSURE: 112 MMHG | WEIGHT: 190 LBS | OXYGEN SATURATION: 99 % | DIASTOLIC BLOOD PRESSURE: 72 MMHG | HEIGHT: 68 IN

## 2020-02-28 DIAGNOSIS — G44.319 ACUTE POST-TRAUMATIC HEADACHE, NOT INTRACTABLE: Primary | ICD-10-CM

## 2020-02-28 DIAGNOSIS — H53.9 VISION CHANGES: ICD-10-CM

## 2020-02-28 DIAGNOSIS — S09.90XA INJURY OF HEAD, INITIAL ENCOUNTER: ICD-10-CM

## 2020-02-28 PROCEDURE — 99214 OFFICE O/P EST MOD 30 MIN: CPT | Performed by: PHYSICIAN ASSISTANT

## 2020-02-28 PROCEDURE — 70450 CT HEAD/BRAIN W/O DYE: CPT

## 2020-02-28 NOTE — PROGRESS NOTES
"Subjective   Ni Rodríguez is a 39 y.o. female presents for   Chief Complaint   Patient presents with   • Headache     hit with basketball in rt ear        History of Present Illness     Ni is a 39-year-old female who presents with headache status post injury to head when basketball hit her right side of head/ear that happen yesterday at approximately 4 pm.  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  She is having  headaches.  States that she \"doesn't feel like herself\". Ni has had decreased concentration and slight blurred vision off and on.  Denied any nausea,vomiting,diarrhea.  Ni wears a bilateral hearing aids.  States her right hearing aid came out of her ear when the basketball hit her right side of face yesterday.  She has had some increase in her [place] in her right ear. She took IBU last night at 4 pm and took Tylenol PM at bedtime. She took Tylenol this morning.  Appetite and sleep has been normal.  She did not seek medical attention yesterday.    The following portions of the patient's history were reviewed and updated as appropriate: allergies, current medications, past family history, past medical history, past social history, past surgical history and problem list.    Review of Systems   Constitutional: Negative.    Eyes: Positive for visual disturbance.   Respiratory: Negative.    Cardiovascular: Negative.    Gastrointestinal: Negative.  Negative for abdominal pain, constipation, diarrhea, nausea and vomiting.   Endocrine: Negative.    Genitourinary: Negative.    Musculoskeletal: Negative.    Skin: Negative.    Allergic/Immunologic: Negative.    Neurological: Positive for headaches. Negative for light-headedness.   Hematological: Negative.    Psychiatric/Behavioral: Negative.  Negative for sleep disturbance.   All other systems reviewed and are negative.        Vitals:    02/28/20 1027   BP: 112/72   Pulse: 89   Resp: 16 " "  Temp: 98.4 °F (36.9 °C)   SpO2: 99%   Weight: 86.2 kg (190 lb)   Height: 172.7 cm (68\")     Wt Readings from Last 3 Encounters:   02/28/20 86.2 kg (190 lb)   03/29/19 86.2 kg (190 lb)   01/02/19 86.2 kg (190 lb)     BP Readings from Last 3 Encounters:   02/28/20 112/72   03/29/19 100/65   01/02/19 118/70     Social History     Socioeconomic History   • Marital status:      Spouse name: Not on file   • Number of children: 2   • Years of education: masters in education   • Highest education level: Not on file   Occupational History   • Occupation: teacher     Employer: [email or web address]   Tobacco Use   • Smoking status: Never Smoker   • Smokeless tobacco: Never Used   Substance and Sexual Activity   • Alcohol use: Yes     Alcohol/week: 1.0 standard drinks     Types: 1 Glasses of wine per week   • Drug use: No   • Sexual activity: Yes     Partners: Male     Birth control/protection: OCP       No Known Allergies    Body mass index is 28.89 kg/m².    Objective   Physical Exam   Constitutional: She is oriented to person, place, and time. Vital signs are normal. She appears well-developed and well-nourished.   HENT:   Head: Normocephalic and atraumatic.   Right Ear: Hearing, tympanic membrane, external ear and ear canal normal.   Left Ear: Hearing, tympanic membrane, external ear and ear canal normal.   Nose: Nose normal. Right sinus exhibits no maxillary sinus tenderness and no frontal sinus tenderness. Left sinus exhibits no maxillary sinus tenderness and no frontal sinus tenderness.   Mouth/Throat: Uvula is midline, oropharynx is clear and moist and mucous membranes are normal.   Eyes: Pupils are equal, round, and reactive to light. Conjunctivae, EOM and lids are normal.   Fundoscopic exam:       The right eye shows no hemorrhage and no papilledema.        The left eye shows no hemorrhage and no papilledema.   Neck: Trachea normal, normal range of motion, full passive range of motion without pain and phonation " normal. Neck supple. No tracheal tenderness present. No tracheal deviation and no edema present.   Cardiovascular: Normal rate, regular rhythm, S1 normal, S2 normal, normal heart sounds, intact distal pulses and normal pulses.   No murmur heard.  Pulmonary/Chest: Effort normal and breath sounds normal.   Abdominal: Soft. Normal appearance, normal aorta and bowel sounds are normal. There is no hepatomegaly. There is no tenderness.   Lymphadenopathy:     She has no cervical adenopathy.   Neurological: She is alert and oriented to person, place, and time. She has normal strength and normal reflexes. No cranial nerve deficit or sensory deficit. She displays a negative Romberg sign.   Reflex Scores:       Patellar reflexes are 2+ on the right side and 2+ on the left side.  Skin: Skin is warm, dry and intact. Capillary refill takes less than 2 seconds.   Psychiatric: She has a normal mood and affect. Her speech is normal and behavior is normal. Judgment and thought content normal. Cognition and memory are normal.       Assessment/Plan   Ni was seen today for headache.    Diagnoses and all orders for this visit:    Acute post-traumatic headache, not intractable  -     CT Head Without Contrast    Vision changes  -     CT Head Without Contrast    Mrs. Ni Rodríguez was seen in office today with new onset headache status post head injury with vision changes.  We will schedule head CT without contrast for further evaluation.  I suspect she may have a mild concussion.  Instructed to rest her eyes for the next few days.  She may take over-the-counter ibuprofen as needed for headache.  Currently asymptomatic for nausea and vomiting.  She will be notified of CAT scan results of head when completed.  Will consider referral to neurology in future if warranted.  Ni will return to office if no improvement.  She was instructed not to do any physical activity for the next week.      [facility]oz [facility]  [facility]  [address] [facility] [id number]  Dept: [phone]  Dept Fax: [phone]  Loc: [phone]  Loc Fax: [phone]

## 2020-03-02 ENCOUNTER — TELEPHONE (OUTPATIENT)
Dept: FAMILY MEDICINE CLINIC | Facility: CLINIC | Age: 40
End: 2020-03-02

## 2020-03-02 NOTE — TELEPHONE ENCOUNTER
Notify patient that a concussion can last days to weeks. Please limit  Eye movements.  Does she need a work excuse?

## 2020-03-02 NOTE — TELEPHONE ENCOUNTER
Pt calling, states she stayed home from work today as she is still having concussion issues.    Tomy like to know if this is okay? And how long she is to feel like this?    Please advise.

## 2020-03-05 ENCOUNTER — TELEPHONE (OUTPATIENT)
Dept: FAMILY MEDICINE CLINIC | Facility: CLINIC | Age: 40
End: 2020-03-05

## 2020-03-05 DIAGNOSIS — G44.319 ACUTE POST-TRAUMATIC HEADACHE, NOT INTRACTABLE: Primary | ICD-10-CM

## 2020-03-05 DIAGNOSIS — H53.9 VISION CHANGES: ICD-10-CM

## 2020-03-05 DIAGNOSIS — S09.90XA INJURY OF HEAD, INITIAL ENCOUNTER: ICD-10-CM

## 2020-03-16 RX ORDER — LEVOTHYROXINE SODIUM 50 MCG
50 TABLET ORAL DAILY
Qty: 90 TABLET | Refills: 2 | Status: SHIPPED | OUTPATIENT
Start: 2020-03-16 | End: 2020-12-15

## 2020-03-28 DIAGNOSIS — J20.9 ACUTE BRONCHITIS, UNSPECIFIED ORGANISM: ICD-10-CM

## 2020-03-28 DIAGNOSIS — J06.9 ACUTE URI: ICD-10-CM

## 2020-03-29 RX ORDER — ALBUTEROL SULFATE 90 UG/1
2 AEROSOL, METERED RESPIRATORY (INHALATION) EVERY 4 HOURS PRN
Qty: 8.5 INHALER | Refills: 0 | Status: SHIPPED | OUTPATIENT
Start: 2020-03-29 | End: 2020-04-27

## 2020-04-27 DIAGNOSIS — J20.9 ACUTE BRONCHITIS, UNSPECIFIED ORGANISM: ICD-10-CM

## 2020-04-27 DIAGNOSIS — J06.9 ACUTE URI: ICD-10-CM

## 2020-04-27 RX ORDER — ALBUTEROL SULFATE 90 UG/1
2 AEROSOL, METERED RESPIRATORY (INHALATION) EVERY 4 HOURS PRN
Qty: 8.5 INHALER | Refills: 3 | Status: SHIPPED | OUTPATIENT
Start: 2020-04-27 | End: 2021-04-28

## 2020-04-29 ENCOUNTER — TELEPHONE (OUTPATIENT)
Dept: FAMILY MEDICINE CLINIC | Facility: CLINIC | Age: 40
End: 2020-04-29

## 2020-04-29 ENCOUNTER — OFFICE VISIT (OUTPATIENT)
Dept: FAMILY MEDICINE CLINIC | Facility: CLINIC | Age: 40
End: 2020-04-29

## 2020-04-29 VITALS
WEIGHT: 185 LBS | DIASTOLIC BLOOD PRESSURE: 78 MMHG | TEMPERATURE: 97.6 F | HEIGHT: 68 IN | OXYGEN SATURATION: 99 % | BODY MASS INDEX: 28.04 KG/M2 | HEART RATE: 100 BPM | SYSTOLIC BLOOD PRESSURE: 118 MMHG | RESPIRATION RATE: 16 BRPM

## 2020-04-29 DIAGNOSIS — H65.194 OTHER RECURRENT ACUTE NONSUPPURATIVE OTITIS MEDIA OF RIGHT EAR: ICD-10-CM

## 2020-04-29 DIAGNOSIS — H69.81 DYSFUNCTION OF RIGHT EUSTACHIAN TUBE: Primary | ICD-10-CM

## 2020-04-29 PROBLEM — H66.90 OTITIS MEDIA: Status: ACTIVE | Noted: 2020-04-29

## 2020-04-29 PROBLEM — H69.91 DYSFUNCTION OF RIGHT EUSTACHIAN TUBE: Status: ACTIVE | Noted: 2020-04-29

## 2020-04-29 PROCEDURE — 99213 OFFICE O/P EST LOW 20 MIN: CPT | Performed by: PHYSICIAN ASSISTANT

## 2020-04-29 RX ORDER — DICLOFENAC SODIUM 75 MG/1
75 TABLET, DELAYED RELEASE ORAL 2 TIMES DAILY
COMMUNITY
Start: 2020-04-17

## 2020-04-29 RX ORDER — METHYLPREDNISOLONE 4 MG/1
TABLET ORAL
Qty: 21 TABLET | Refills: 0 | Status: SHIPPED | OUTPATIENT
Start: 2020-04-29 | End: 2020-07-21

## 2020-04-29 RX ORDER — FEXOFENADINE HCL 180 MG/1
180 TABLET ORAL DAILY
COMMUNITY

## 2020-04-29 RX ORDER — AMOXICILLIN 875 MG/1
875 TABLET, COATED ORAL 2 TIMES DAILY
Qty: 20 TABLET | Refills: 0 | Status: SHIPPED | OUTPATIENT
Start: 2020-04-29 | End: 2020-05-09

## 2020-04-29 RX ORDER — DULOXETIN HYDROCHLORIDE 60 MG/1
60 CAPSULE, DELAYED RELEASE ORAL DAILY
COMMUNITY
End: 2022-02-14 | Stop reason: CLARIF

## 2020-04-29 RX ORDER — SUMATRIPTAN 100 MG/1
50 TABLET, FILM COATED ORAL
COMMUNITY
Start: 2020-03-06 | End: 2021-02-11

## 2020-04-29 RX ORDER — LANOLIN ALCOHOL/MO/W.PET/CERES
CREAM (GRAM) TOPICAL
COMMUNITY
End: 2021-07-09

## 2020-04-29 NOTE — TELEPHONE ENCOUNTER
Pt calling, states she is still having pain where she was hit by a ball. She was seen for this in the past. States she has some persistent symptoms of her concussion as well, would like to know what she should do next?  Please advise.

## 2020-04-29 NOTE — TELEPHONE ENCOUNTER
I spoke with patient on April 29, 2020 at 10:55 AM.  She will be seen at 1130 today for her ear pain.

## 2020-07-21 ENCOUNTER — OFFICE VISIT (OUTPATIENT)
Dept: FAMILY MEDICINE CLINIC | Facility: CLINIC | Age: 40
End: 2020-07-21

## 2020-07-21 VITALS
TEMPERATURE: 97.9 F | SYSTOLIC BLOOD PRESSURE: 120 MMHG | HEIGHT: 68 IN | HEART RATE: 88 BPM | BODY MASS INDEX: 28.79 KG/M2 | WEIGHT: 190 LBS | OXYGEN SATURATION: 99 % | DIASTOLIC BLOOD PRESSURE: 80 MMHG

## 2020-07-21 DIAGNOSIS — R21 RASH AND NONSPECIFIC SKIN ERUPTION: Primary | ICD-10-CM

## 2020-07-21 PROCEDURE — 99213 OFFICE O/P EST LOW 20 MIN: CPT | Performed by: FAMILY MEDICINE

## 2020-07-21 RX ORDER — DIAPER,BRIEF,INFANT-TODD,DISP
EACH MISCELLANEOUS 2 TIMES DAILY
Qty: 56 G | Refills: 0 | Status: SHIPPED | OUTPATIENT
Start: 2020-07-21 | End: 2021-11-30

## 2020-07-21 NOTE — PROGRESS NOTES
Chief Complaint   Patient presents with   • Rash       Subjective   Ni Rodríguez is a 40 y.o. female.     History of Present Illness       40-year-old female here for rash/she is concerned about coronavirus    She says she was around her 20-year-old family member who then developed coughing and symptoms of COVID over the weekend and now she is concerned.  The patient is asymptomatic and not having any respiratory or other symptoms just a rash.  She does has some small little red patches on her calf muscles and near her bra line.  She was out in the yard a lot.  It is itchy.  Hydrocortisone helps. Patient's family member COVID test supposed to come back in next 24-48 hours.     The following portions of the patient's history were reviewed and updated as appropriate: allergies, current medications, past family history, past medical history, past social history, past surgical history and problem list.      Past Medical History:   Diagnosis Date   • Bright red blood per rectum    • Disease of thyroid gland    • Environmental allergies    • History of prior pregnancies      3   • Miscarriage     X 1   • URI (upper respiratory infection)        History reviewed. No pertinent surgical history.    Family History   Problem Relation Age of Onset   • Arthritis Mother         Osteoarthritis   • Thyroid disease Mother    • Hypertension Father    • Pancreatitis Father         GB complication -  removed   • Hyperlipidemia Father    • Other Sister         Hypoglycemia   • Hypothyroidism Brother    • Uterine cancer Maternal Grandmother    • Alzheimer's disease Maternal Grandfather    • Prostate cancer Maternal Grandfather    • Arthritis Paternal Grandmother         Osteoarthritis   • Prostate cancer Paternal Grandfather        Social History     Socioeconomic History   • Marital status:      Spouse name: Not on file   • Number of children: 2   • Years of education: masters in education   • Highest education level:  Not on file   Occupational History   • Occupation: teacher     Employer: [email or web address]   Tobacco Use   • Smoking status: Never Smoker   • Smokeless tobacco: Never Used   Substance and Sexual Activity   • Alcohol use: Yes     Alcohol/week: 1.0 standard drinks     Types: 1 Glasses of wine per week   • Drug use: No   • Sexual activity: Yes     Partners: Male     Birth control/protection: OCP       Current Outpatient Medications on File Prior to Visit   Medication Sig Dispense Refill   • ALBUTEROL SULFATE  (90 Base) MCG/ACT inhaler INHALE 2 PUFFS EVERY 4 (FOUR) HOURS AS NEEDED FOR WHEEZING OR SHORTNESS OF AIR. 8.5 inhaler 3   • diclofenac (VOLTAREN) 75 MG EC tablet Take 75 mg by mouth 2 (Two) Times a Day.     • drospirenone-ethinyl estradiol (MATT,OCELLA) 3-0.03 MG per tablet Take by mouth.     • DULoxetine (CYMBALTA) 60 MG capsule Take 60 mg by mouth Daily.     • fexofenadine (ALLEGRA) 180 MG tablet Take 180 mg by mouth Daily.     • fluticasone (FLONASE) 50 MCG/ACT nasal spray into each nostril.     • SUMAtriptan (IMITREX) 100 MG tablet Take 50 mg by mouth.     • SYNTHROID 50 MCG tablet TAKE 1 TABLET BY MOUTH DAILY. MARILYN/DNS 90 tablet 2   • Vitamin B12 (CYANOCOBALAMIN) 500 MCG tablet tablet Take  by mouth.     • [DISCONTINUED] methylPREDNISolone (MEDROL, BRADLY,) 4 MG tablet Take as directed on package instructions. 21 tablet 0     No current facility-administered medications on file prior to visit.        Review of Systems   Constitutional: Negative for chills and fever.   HENT: Negative for congestion.    Respiratory: Negative for shortness of breath.    Cardiovascular: Negative for chest pain.   Gastrointestinal: Negative for abdominal pain.   Musculoskeletal: Negative for myalgias.   Skin: Positive for rash.   Neurological: Negative for light-headedness.   Psychiatric/Behavioral: Negative for depressed mood.           Vitals:    07/21/20 1622   BP: 120/80   Pulse: 88   Temp: 97.9 °F (36.6 °C)   SpO2: 99%       Objective   Physical Exam   Constitutional: She is oriented to person, place, and time. She appears well-developed and well-nourished.   HENT:   Head: Normocephalic and atraumatic.   Eyes: Pupils are equal, round, and reactive to light. Conjunctivae and EOM are normal.   Neck: Neck supple.   Cardiovascular: Normal rate, regular rhythm and normal heart sounds.   No murmur heard.  Pulmonary/Chest: Effort normal and breath sounds normal. No respiratory distress.   Abdominal: Soft. Bowel sounds are normal.   Musculoskeletal: Normal range of motion.   Neurological: She is alert and oriented to person, place, and time.   Skin: Skin is warm and dry.   Area bumps/streaking on calves small patch on chest midline   Psychiatric: She has a normal mood and affect.   Nursing note and vitals reviewed.        Assessment/Plan   Problem List Items Addressed This Visit     None      Visit Diagnoses     Rash and nonspecific skin eruption    -  Primary    Relevant Medications    hydrocortisone 1 % ointment        -Steroid cream helped, may continue, possible poison ivy reaction  -Possible covid exposure 4 days prior, if was an exposure patient to let us know, no other symptoms otherwise well            Return if symptoms worsen or fail to improve.      Li Knutson MD

## 2020-12-15 RX ORDER — LEVOTHYROXINE SODIUM 50 MCG
50 TABLET ORAL DAILY
Qty: 30 TABLET | Refills: 0 | Status: SHIPPED | OUTPATIENT
Start: 2020-12-15 | End: 2021-01-11

## 2021-01-11 RX ORDER — LEVOTHYROXINE SODIUM 50 MCG
TABLET ORAL
Qty: 30 TABLET | Refills: 0 | Status: SHIPPED | OUTPATIENT
Start: 2021-01-11 | End: 2021-02-08

## 2021-01-15 DIAGNOSIS — R53.82 CHRONIC FATIGUE: ICD-10-CM

## 2021-01-15 DIAGNOSIS — E53.8 VITAMIN B 12 DEFICIENCY: ICD-10-CM

## 2021-01-15 DIAGNOSIS — E03.9 ACQUIRED HYPOTHYROIDISM: ICD-10-CM

## 2021-01-15 DIAGNOSIS — Z13.220 SCREENING FOR LIPID DISORDERS: Primary | ICD-10-CM

## 2021-01-15 DIAGNOSIS — Z00.00 ROUTINE ADULT HEALTH MAINTENANCE: ICD-10-CM

## 2021-01-19 LAB
ALBUMIN SERPL-MCNC: 4.3 G/DL (ref 3.5–5.2)
ALBUMIN/GLOB SERPL: 1.4 G/DL
ALP SERPL-CCNC: 62 U/L (ref 39–117)
ALT SERPL-CCNC: 13 U/L (ref 1–33)
AST SERPL-CCNC: 13 U/L (ref 1–32)
BASOPHILS # BLD AUTO: 0.06 10*3/MM3 (ref 0–0.2)
BASOPHILS NFR BLD AUTO: 0.7 % (ref 0–1.5)
BILIRUB SERPL-MCNC: 0.5 MG/DL (ref 0–1.2)
BUN SERPL-MCNC: 13 MG/DL (ref 6–20)
BUN/CREAT SERPL: 18.1 (ref 7–25)
CALCIUM SERPL-MCNC: 9.7 MG/DL (ref 8.6–10.5)
CHLORIDE SERPL-SCNC: 102 MMOL/L (ref 98–107)
CHOLEST SERPL-MCNC: 213 MG/DL (ref 0–200)
CO2 SERPL-SCNC: 26.8 MMOL/L (ref 22–29)
CREAT SERPL-MCNC: 0.72 MG/DL (ref 0.57–1)
EOSINOPHIL # BLD AUTO: 0.36 10*3/MM3 (ref 0–0.4)
EOSINOPHIL NFR BLD AUTO: 4.2 % (ref 0.3–6.2)
ERYTHROCYTE [DISTWIDTH] IN BLOOD BY AUTOMATED COUNT: 12.2 % (ref 12.3–15.4)
GLOBULIN SER CALC-MCNC: 3.1 GM/DL
GLUCOSE SERPL-MCNC: 85 MG/DL (ref 65–99)
HCT VFR BLD AUTO: 43.5 % (ref 34–46.6)
HCV AB S/CO SERPL IA: <0.1 S/CO RATIO (ref 0–0.9)
HDLC SERPL-MCNC: 70 MG/DL (ref 40–60)
HGB BLD-MCNC: 14.9 G/DL (ref 12–15.9)
IMM GRANULOCYTES # BLD AUTO: 0.06 10*3/MM3 (ref 0–0.05)
IMM GRANULOCYTES NFR BLD AUTO: 0.7 % (ref 0–0.5)
LDLC SERPL CALC-MCNC: 116 MG/DL (ref 0–100)
LDLC/HDLC SERPL: 1.59 {RATIO}
LYMPHOCYTES # BLD AUTO: 2.89 10*3/MM3 (ref 0.7–3.1)
LYMPHOCYTES NFR BLD AUTO: 34 % (ref 19.6–45.3)
MCH RBC QN AUTO: 29.7 PG (ref 26.6–33)
MCHC RBC AUTO-ENTMCNC: 34.3 G/DL (ref 31.5–35.7)
MCV RBC AUTO: 86.8 FL (ref 79–97)
MONOCYTES # BLD AUTO: 0.84 10*3/MM3 (ref 0.1–0.9)
MONOCYTES NFR BLD AUTO: 9.9 % (ref 5–12)
NEUTROPHILS # BLD AUTO: 4.29 10*3/MM3 (ref 1.7–7)
NEUTROPHILS NFR BLD AUTO: 50.5 % (ref 42.7–76)
NRBC BLD AUTO-RTO: 0 /100 WBC (ref 0–0.2)
PLATELET # BLD AUTO: 297 10*3/MM3 (ref 140–450)
POTASSIUM SERPL-SCNC: 4.7 MMOL/L (ref 3.5–5.2)
PROT SERPL-MCNC: 7.4 G/DL (ref 6–8.5)
RBC # BLD AUTO: 5.01 10*6/MM3 (ref 3.77–5.28)
SODIUM SERPL-SCNC: 138 MMOL/L (ref 136–145)
TRIGL SERPL-MCNC: 157 MG/DL (ref 0–150)
TSH SERPL DL<=0.005 MIU/L-ACNC: 1.7 UIU/ML (ref 0.27–4.2)
VIT B12 SERPL-MCNC: 1161 PG/ML (ref 211–946)
VLDLC SERPL CALC-MCNC: 27 MG/DL (ref 5–40)
WBC # BLD AUTO: 8.5 10*3/MM3 (ref 3.4–10.8)

## 2021-02-08 RX ORDER — LEVOTHYROXINE SODIUM 50 MCG
TABLET ORAL
Qty: 30 TABLET | Refills: 0 | Status: SHIPPED | OUTPATIENT
Start: 2021-02-08 | End: 2021-02-11

## 2021-02-11 ENCOUNTER — OFFICE VISIT (OUTPATIENT)
Dept: FAMILY MEDICINE CLINIC | Facility: CLINIC | Age: 41
End: 2021-02-11

## 2021-02-11 VITALS
HEIGHT: 68 IN | SYSTOLIC BLOOD PRESSURE: 120 MMHG | DIASTOLIC BLOOD PRESSURE: 80 MMHG | OXYGEN SATURATION: 97 % | HEART RATE: 104 BPM | WEIGHT: 200 LBS | BODY MASS INDEX: 30.31 KG/M2

## 2021-02-11 DIAGNOSIS — E03.9 ACQUIRED HYPOTHYROIDISM: ICD-10-CM

## 2021-02-11 DIAGNOSIS — Z00.00 ENCOUNTER FOR ANNUAL PHYSICAL EXAM: Primary | ICD-10-CM

## 2021-02-11 DIAGNOSIS — E78.2 MIXED HYPERLIPIDEMIA: ICD-10-CM

## 2021-02-11 PROCEDURE — 99396 PREV VISIT EST AGE 40-64: CPT | Performed by: PHYSICIAN ASSISTANT

## 2021-02-11 RX ORDER — LEVOTHYROXINE SODIUM 50 MCG
50 TABLET ORAL DAILY
Qty: 90 TABLET | Refills: 3 | Status: SHIPPED | OUTPATIENT
Start: 2021-02-11 | End: 2022-03-09

## 2021-02-11 NOTE — PROGRESS NOTES
"Chief Complaint  Annual Exam    Subjective          Ni Rodríguez presents to [facility]  History of Present Illness  Ni is a 40-year-old female who presents for annual physical examination with hypothyroidism management.. States overall she is doing well today.  Still seeing neurology for her postconcussion headaches.  She has been receiving Botox injection and taking Cymbalta medication.  States this is helping.  Her diet has not been as healthy but she is trying to improve.  Has not been exercising routinely.  Sleep has been normal.  Denied any fevers, chills, upper respiratory symptoms, chest pain, shortness of air, wheezing, cough, abdominal pain, GI upset, urinary symptoms or vaginal discharge.  Bowel movements have been daily without dark black tarry stools.  Currently sees Dr. Lorenz for her gynecological needs.  Review of Systems   Constitutional: Negative.    Eyes: Negative.    Respiratory: Negative.  Negative for cough, chest tightness, shortness of breath and wheezing.    Cardiovascular: Negative.  Negative for chest pain, palpitations and leg swelling.   Gastrointestinal: Negative.    Endocrine: Negative.    Genitourinary: Negative.    Musculoskeletal: Negative.    Skin: Negative.    Allergic/Immunologic: Negative.    Hematological: Negative.    Psychiatric/Behavioral: Negative.  Negative for sleep disturbance.     Objective   Vital Signs:   /80   Pulse 104   Ht 172.7 cm (68\")   Wt 90.7 kg (200 lb)   SpO2 97%   BMI 30.41 kg/m²     Physical Exam  Vitals signs and nursing note reviewed.   Constitutional:       Appearance: Normal appearance. She is well-developed and well-groomed. She is obese.      Interventions: Face mask in place.   HENT:      Head: Normocephalic and atraumatic.      Jaw: There is normal jaw occlusion.      Right Ear: Hearing, tympanic membrane, ear canal and external ear normal.      Left Ear: Hearing, tympanic membrane, ear canal and " external ear normal.      Nose: Nose normal.      Right Sinus: No maxillary sinus tenderness or frontal sinus tenderness.      Left Sinus: No maxillary sinus tenderness or frontal sinus tenderness.      Mouth/Throat:      Lips: Pink.      Mouth: Mucous membranes are moist.      Tongue: No lesions.      Palate: No mass.      Pharynx: Oropharynx is clear. Uvula midline.   Eyes:      General: Lids are normal.      Conjunctiva/sclera: Conjunctivae normal.      Pupils: Pupils are equal, round, and reactive to light.   Neck:      Musculoskeletal: Neck supple. No edema.      Thyroid: No thyroid mass, thyromegaly or thyroid tenderness.      Trachea: Trachea and phonation normal. No tracheal tenderness.   Cardiovascular:      Rate and Rhythm: Normal rate and regular rhythm.      Pulses: Normal pulses.      Heart sounds: Normal heart sounds, S1 normal and S2 normal. No murmur.   Pulmonary:      Effort: Pulmonary effort is normal.      Breath sounds: Normal breath sounds and air entry.   Abdominal:      General: Bowel sounds are normal.      Palpations: Abdomen is soft. There is no hepatomegaly.      Tenderness: There is no abdominal tenderness.   Musculoskeletal:      Right lower leg: No edema.      Left lower leg: No edema.   Lymphadenopathy:      Cervical: No cervical adenopathy.      Right cervical: No superficial, deep or posterior cervical adenopathy.     Left cervical: No superficial, deep or posterior cervical adenopathy.   Skin:     General: Skin is warm and dry.      Capillary Refill: Capillary refill takes less than 2 seconds.   Neurological:      Mental Status: She is alert and oriented to person, place, and time.      Deep Tendon Reflexes:      Reflex Scores:       Patellar reflexes are 2+ on the right side and 2+ on the left side.  Psychiatric:         Attention and Perception: Attention and perception normal.         Mood and Affect: Mood and affect normal.         Speech: Speech normal.         Behavior:  Behavior normal. Behavior is cooperative.         Thought Content: Thought content normal.         Cognition and Memory: Cognition and memory normal.         Judgment: Judgment normal.     I was wearing a surgical mask and face shield during the entire office visit/encounter.     Result Review :     Common labs    Common Labsle 1/18/21 1/18/21 1/18/21    1129 1129 1129   Glucose  85    BUN  13    Creatinine  0.72    eGFR Non  Am  89    eGFR African Am  108    Sodium  138    Potassium  4.7    Chloride  102    Calcium  9.7    Total Protein  7.4    Albumin  4.30    Total Bilirubin  0.5    Alkaline Phosphatase  62    AST (SGOT)  13    ALT (SGPT)  13    WBC 8.50     Hemoglobin 14.9     Hematocrit 43.5     Platelets 297     Total Cholesterol   213 (A)   Triglycerides   157 (A)   HDL Cholesterol   70 (A)   LDL Cholesterol    116 (A)   (A) Abnormal value       Comments are available for some flowsheets but are not being displayed.           TSH    TSH 1/18/21   TSH 1.700                      Assessment and Plan    Diagnoses and all orders for this visit:    1. Encounter for annual physical exam (Primary)    2. Acquired hypothyroidism  -     Synthroid 50 MCG tablet; Take 1 tablet by mouth Daily. MARILYN/DNS  Dispense: 90 tablet; Refill: 3    3. Mixed hyperlipidemia      1.  Annual physical examination with hyperlipidemia: I reviewed her above blood work with her at office visit today.  Her cholesterol and triglycerides have increased over the past year.  Stressed the importance of eating healthy and exercising regularly.  She will return to office in 6 months for fasting lab work to include CMP and lipid profile.  2.  Chronic and stable acquired hypothyroidism: Doing well with her thyroid medication.  TSH is in therapeutic range with above blood work.  I have sent refills of her Synthroid to pharmacy.  We will recheck TSH in 6 months to 1 year.      Follow Up   No follow-ups on file.  Patient was given instructions and  counseling regarding her condition or for health maintenance advice. Please see specific information pulled into the AVS if appropriate.     Patient Counseling:  --Nutrition: Stressed importance of moderation in sodium/caffeine intake, saturated fat and cholesterol.  Discussed caloric balance, sufficient intake of fresh fruits, vegetables, fiber,   calcium, iron.  --Discussed the new recommendation against daily use of baby aspirin for primary prevention in low risk patients.  --Exercise: Stressed the importance of regular exercise by incorporating into daily routine.    --Substance Abuse: Discussed cessation/primary prevention of tobacco, alcohol, or other drug use; driving or other dangerous activities under the influence.    --Dental health: Discussed importance of regular tooth brushing, flossing, and dental visits.  -- Suggested having eyes and vision checked if needed or past due.  --Immunizations reviewed and up to date.          BELINDA Munoz [facility]  [address] [place] [id number]  Dept: [phone]  Dept Fax: [phone]  Loc: [phone]  Loc Fax: [phone]

## 2021-04-07 ENCOUNTER — OFFICE VISIT (OUTPATIENT)
Dept: FAMILY MEDICINE CLINIC | Facility: CLINIC | Age: 41
End: 2021-04-07

## 2021-04-07 VITALS
OXYGEN SATURATION: 99 % | DIASTOLIC BLOOD PRESSURE: 70 MMHG | HEIGHT: 68 IN | HEART RATE: 92 BPM | RESPIRATION RATE: 14 BRPM | WEIGHT: 206 LBS | BODY MASS INDEX: 31.22 KG/M2 | TEMPERATURE: 95.9 F | SYSTOLIC BLOOD PRESSURE: 120 MMHG

## 2021-04-07 DIAGNOSIS — L98.9 SKIN SORE: Primary | ICD-10-CM

## 2021-04-07 PROBLEM — G43.719 INTRACTABLE CHRONIC MIGRAINE WITHOUT AURA: Status: ACTIVE | Noted: 2021-03-12

## 2021-04-07 PROCEDURE — 99213 OFFICE O/P EST LOW 20 MIN: CPT | Performed by: PHYSICIAN ASSISTANT

## 2021-04-07 RX ORDER — RIMEGEPANT SULFATE 75 MG/75MG
TABLET, ORALLY DISINTEGRATING ORAL
COMMUNITY
Start: 2021-03-30 | End: 2021-07-09

## 2021-04-07 RX ORDER — MUPIROCIN CALCIUM 20 MG/G
CREAM TOPICAL 3 TIMES DAILY
Qty: 15 G | Refills: 0 | Status: SHIPPED | OUTPATIENT
Start: 2021-04-07 | End: 2021-09-10

## 2021-04-07 RX ORDER — ERENUMAB-AOOE 140 MG/ML
140 INJECTION, SOLUTION SUBCUTANEOUS
COMMUNITY
Start: 2021-03-12

## 2021-04-07 NOTE — PROGRESS NOTES
"Chief Complaint  Rash (left foot)    Subjective          Ni Rodríguez presents to [facility]  History of Present Illness  Ni is a 40-year-old female who presents with new rash on the left foot for the past week.  States she noticed the rash about a week ago.  It has been itching and has been red with some yellow discharge off and on.  She has been using over-the-counter hydrocortisone ointment and has used a topical clindamycin without relief.  States clindamycin seemed to make it worse.  Denied any recent pedicures or injury to foot.     Objective   Vital Signs:   /70 (BP Location: Left arm, Patient Position: Sitting, Cuff Size: Adult)   Pulse 92   Temp 95.9 °F (35.5 °C) (Infrared)   Resp 14   Ht 172.7 cm (68\")   Wt 93.4 kg (206 lb)   SpO2 99%   BMI 31.32 kg/m²     Physical Exam  Vitals and nursing note reviewed.   Constitutional:       Appearance: Normal appearance. She is well-developed and well-groomed. She is obese.      Interventions: Face mask in place.   Skin:     General: Skin is warm and dry.      Capillary Refill: Capillary refill takes less than 2 seconds.      Findings: Erythema and lesion present.          Neurological:      Mental Status: She is alert and oriented to person, place, and time.   Psychiatric:         Attention and Perception: Attention and perception normal.         Mood and Affect: Mood and affect normal.         Speech: Speech normal.         Behavior: Behavior is cooperative.         Thought Content: Thought content normal.         Cognition and Memory: Cognition and memory normal.         Judgment: Judgment normal.     I was wearing a surgical mask and face shield during the entire office visit/encounter.     Result Review :                 Assessment and Plan    Diagnoses and all orders for this visit:    1. Skin sore (Primary)  -     mupirocin (Bactroban) 2 % cream; Apply  topically to the appropriate area as directed 3 (Three) Times " a Day.  Dispense: 15 g; Refill: 0    Mrs. Ni Rodríguez was seen in office today with new sore on left foot.  I suspect this may be a staph infection.  I have sent Bactroban cream to pharmacy to use as directed.  She will return to office if symptoms do not improve.      Follow Up   No follow-ups on file.  Patient was given instructions and counseling regarding her condition or for health maintenance advice. Please see specific information pulled into the AVS if appropriate.     BELINDA Munoz [facility]  [address] [place] [id number]  Dept: [phone]  Dept Fax: [phone]  Loc: [phone]  Loc Fax: [phone]

## 2021-04-27 DIAGNOSIS — J20.9 ACUTE BRONCHITIS, UNSPECIFIED ORGANISM: ICD-10-CM

## 2021-04-27 DIAGNOSIS — J06.9 ACUTE URI: ICD-10-CM

## 2021-04-28 RX ORDER — ALBUTEROL SULFATE 90 UG/1
2 AEROSOL, METERED RESPIRATORY (INHALATION) EVERY 4 HOURS PRN
Qty: 18 G | Refills: 3 | Status: SHIPPED | OUTPATIENT
Start: 2021-04-28 | End: 2022-05-31

## 2021-07-09 ENCOUNTER — HOSPITAL ENCOUNTER (OUTPATIENT)
Dept: GENERAL RADIOLOGY | Facility: HOSPITAL | Age: 41
Discharge: HOME | End: 2021-07-09

## 2021-07-09 ENCOUNTER — OFFICE VISIT (OUTPATIENT)
Dept: FAMILY MEDICINE CLINIC | Facility: CLINIC | Age: 41
End: 2021-07-09

## 2021-07-09 VITALS
WEIGHT: 206 LBS | HEART RATE: 96 BPM | TEMPERATURE: 98.4 F | DIASTOLIC BLOOD PRESSURE: 78 MMHG | BODY MASS INDEX: 31.22 KG/M2 | HEIGHT: 68 IN | SYSTOLIC BLOOD PRESSURE: 120 MMHG | OXYGEN SATURATION: 100 %

## 2021-07-09 DIAGNOSIS — G89.29 CHRONIC FOOT PAIN, LEFT: Primary | ICD-10-CM

## 2021-07-09 DIAGNOSIS — G89.29 CHRONIC PAIN OF LEFT ANKLE: ICD-10-CM

## 2021-07-09 DIAGNOSIS — M79.672 CHRONIC FOOT PAIN, LEFT: Primary | ICD-10-CM

## 2021-07-09 DIAGNOSIS — G89.29 CHRONIC FOOT PAIN, LEFT: ICD-10-CM

## 2021-07-09 DIAGNOSIS — M25.572 CHRONIC PAIN OF LEFT ANKLE: ICD-10-CM

## 2021-07-09 DIAGNOSIS — M79.672 CHRONIC FOOT PAIN, LEFT: ICD-10-CM

## 2021-07-09 PROCEDURE — 73610 X-RAY EXAM OF ANKLE: CPT

## 2021-07-09 PROCEDURE — 99213 OFFICE O/P EST LOW 20 MIN: CPT | Performed by: PHYSICIAN ASSISTANT

## 2021-07-09 PROCEDURE — 73630 X-RAY EXAM OF FOOT: CPT

## 2021-07-09 NOTE — PROGRESS NOTES
"Chief Complaint  Foot Pain (left foot x 3 months)    Subjective          Ni Rodríguez presents to [facility]  History of Present Illness  Ni is a 41-year-old female who presents with left foot and ankle pain for 3 months.  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  She has had pain in her left foot and ankle area since then.  Has seen chiropractor, Dr. Trey Huitron for this over the past 3 months.  She was referred to occupational therapist/physical therapist,Maurice Couch, who has been doing therapy as well as Ultrasound of her left Foot and Ankle.  States she is still having pain in her left foot and ankle area.  She has noticed some swelling as well.  She has used kinesiology tape which has helped slightly.  Has used over-the-counter Tylenol without relief.  Denied any injury or trauma to foot or ankle area.  She has noticed increased pain when she is walking.     Objective   Vital Signs:   /78   Pulse 96   Temp 98.4 °F (36.9 °C)   Ht 172.7 cm (68\")   Wt 93.4 kg (206 lb)   SpO2 100%   BMI 31.32 kg/m²     Physical Exam  Vitals and nursing note reviewed.   Constitutional:       Appearance: Normal appearance. She is well-developed and well-groomed. She is obese.   Musculoskeletal:      Right ankle: Normal.      Right Achilles Tendon: Normal.      Left ankle: Swelling present. No ecchymosis. Tenderness present over the lateral malleolus. Normal range of motion. Normal pulse.      Left Achilles Tendon: Normal.      Right foot: Normal.      Left foot: Decreased range of motion. Normal capillary refill. Swelling, tenderness and bony tenderness present. No deformity, bunion or crepitus. Normal pulse.        Legs:    Skin:     General: Skin is warm and dry.      Capillary Refill: Capillary refill takes less than 2 seconds.   Neurological:      Mental Status: She is alert and oriented to person, place, and time.      Sensory: Sensation is " intact.      Motor: Motor function is intact.      Coordination: Coordination is intact.   Psychiatric:         Attention and Perception: Attention and perception normal.         Mood and Affect: Mood and affect normal.         Speech: Speech normal.         Behavior: Behavior normal. Behavior is cooperative.         Thought Content: Thought content normal.         Cognition and Memory: Cognition and memory normal.         Judgment: Judgment normal.     I was wearing a surgical mask and face shield during the entire office visit/encounter.     Result Review :                 Assessment and Plan    Diagnoses and all orders for this visit:    1. Chronic foot pain, left (Primary)  -     XR Foot 3+ View Left; Future  -     XR Ankle 3+ View Left; Future    2. Chronic pain of left ankle  -     XR Foot 3+ View Left; Future  -     XR Ankle 3+ View Left; Future    Mrs. Arya Rodríguez was seen in office today with chronic left foot pain and left ankle pain.   I will  proceed with left foot/ankle x-ray at [facility].  Ni will use over-the-counter Tylenol as directed.  Instructed to wear an ankle brace when ambulating.  Will consider MRI in future if warranted.  I will consider referral to orthopedist/podiatrist in future if warranted.     Follow Up   No follow-ups on file.  Patient was given instructions and counseling regarding her condition or for health maintenance advice. Please see specific information pulled into the AVS if appropriate.     BELINDA Munoz [facility]  [address] [place] [phone]  Dept: [phone]  Dept Fax: [phone]  Loc: [phone]  Loc Fax: [phone]

## 2021-07-19 ENCOUNTER — HOSPITAL ENCOUNTER (OUTPATIENT)
Dept: MRI IMAGING | Facility: HOSPITAL | Age: 41
Discharge: HOME | End: 2021-07-19
Admitting: PHYSICIAN ASSISTANT

## 2021-07-19 DIAGNOSIS — M25.572 CHRONIC PAIN OF LEFT ANKLE: ICD-10-CM

## 2021-07-19 DIAGNOSIS — G89.29 CHRONIC PAIN OF LEFT ANKLE: ICD-10-CM

## 2021-07-19 DIAGNOSIS — G89.29 CHRONIC FOOT PAIN, LEFT: ICD-10-CM

## 2021-07-19 DIAGNOSIS — M79.672 CHRONIC FOOT PAIN, LEFT: ICD-10-CM

## 2021-07-19 PROCEDURE — 73718 MRI LOWER EXTREMITY W/O DYE: CPT

## 2021-07-21 ENCOUNTER — TELEPHONE (OUTPATIENT)
Dept: FAMILY MEDICINE CLINIC | Facility: CLINIC | Age: 41
End: 2021-07-21

## 2021-07-21 DIAGNOSIS — G89.29 CHRONIC PAIN OF LEFT ANKLE: Primary | ICD-10-CM

## 2021-07-21 DIAGNOSIS — M79.672 CHRONIC FOOT PAIN, LEFT: ICD-10-CM

## 2021-07-21 DIAGNOSIS — R93.89 ABNORMAL FINDING ON IMAGING: ICD-10-CM

## 2021-07-21 DIAGNOSIS — M25.572 CHRONIC PAIN OF LEFT ANKLE: Primary | ICD-10-CM

## 2021-07-21 DIAGNOSIS — G89.29 CHRONIC FOOT PAIN, LEFT: ICD-10-CM

## 2021-07-21 NOTE — TELEPHONE ENCOUNTER
Caller: Ni Rodríguez    Relationship: Self    Best call back number: [phone]*    What is the best time to reach you: ANYTIME    Who are you requesting to speak with (clinical staff, provider,  specific staff member): BERNARDO ROGERS    What was the call regarding: REQUEST CALLBACK FROM BERNARDO ROGERS TO DISCUSS LABS.    Do you require a callback: YES

## 2021-07-26 ENCOUNTER — TELEPHONE (OUTPATIENT)
Dept: FAMILY MEDICINE CLINIC | Facility: CLINIC | Age: 41
End: 2021-07-26

## 2021-07-26 DIAGNOSIS — M79.672 CHRONIC FOOT PAIN, LEFT: ICD-10-CM

## 2021-07-26 DIAGNOSIS — G89.29 CHRONIC FOOT PAIN, LEFT: ICD-10-CM

## 2021-07-26 DIAGNOSIS — R93.89 ABNORMAL FINDING ON IMAGING: ICD-10-CM

## 2021-07-26 DIAGNOSIS — G89.29 CHRONIC PAIN OF LEFT ANKLE: Primary | ICD-10-CM

## 2021-07-26 DIAGNOSIS — M25.572 CHRONIC PAIN OF LEFT ANKLE: Primary | ICD-10-CM

## 2021-07-26 NOTE — TELEPHONE ENCOUNTER
Caller: Ni Rodríguez    Relationship: Self    Best call back number:     What form or medical record are you requesting: COPY OF LAST LABS    Who is requesting this form or medical record from you:       How would you like to receive the form or medical records (pick-up, mail, fax):   If fax, what is the fax number:   If mail, what is the address:   If pick-up, provide patient with address and location details    Timeframe paperwork needed:     Additional notes: PATIENT IS WANTING TO  A COPY OF HER LAST LABS.

## 2021-07-29 DIAGNOSIS — E03.9 ACQUIRED HYPOTHYROIDISM: ICD-10-CM

## 2021-07-29 DIAGNOSIS — E78.2 MIXED HYPERLIPIDEMIA: ICD-10-CM

## 2021-07-29 DIAGNOSIS — Z13.220 SCREENING FOR LIPID DISORDERS: Primary | ICD-10-CM

## 2021-07-29 DIAGNOSIS — R53.82 CHRONIC FATIGUE: ICD-10-CM

## 2021-07-31 LAB
ALBUMIN SERPL-MCNC: 4 G/DL (ref 3.5–5.2)
ALBUMIN/GLOB SERPL: 1.2 G/DL
ALP SERPL-CCNC: 63 U/L (ref 39–117)
ALT SERPL-CCNC: 14 U/L (ref 1–33)
AST SERPL-CCNC: 16 U/L (ref 1–32)
BASOPHILS # BLD AUTO: 0.07 10*3/MM3 (ref 0–0.2)
BASOPHILS NFR BLD AUTO: 0.7 % (ref 0–1.5)
BILIRUB SERPL-MCNC: 0.5 MG/DL (ref 0–1.2)
BUN SERPL-MCNC: 9 MG/DL (ref 6–20)
BUN/CREAT SERPL: 12.3 (ref 7–25)
CALCIUM SERPL-MCNC: 9.8 MG/DL (ref 8.6–10.5)
CHLORIDE SERPL-SCNC: 105 MMOL/L (ref 98–107)
CHOLEST SERPL-MCNC: 184 MG/DL (ref 0–200)
CO2 SERPL-SCNC: 23 MMOL/L (ref 22–29)
CREAT SERPL-MCNC: 0.73 MG/DL (ref 0.57–1)
EOSINOPHIL # BLD AUTO: 0.28 10*3/MM3 (ref 0–0.4)
EOSINOPHIL NFR BLD AUTO: 2.6 % (ref 0.3–6.2)
ERYTHROCYTE [DISTWIDTH] IN BLOOD BY AUTOMATED COUNT: 12.4 % (ref 12.3–15.4)
FT4I SERPL CALC-MCNC: 1.8 (ref 1.2–4.9)
GLOBULIN SER CALC-MCNC: 3.4 GM/DL
GLUCOSE SERPL-MCNC: 86 MG/DL (ref 65–99)
HCT VFR BLD AUTO: 44.1 % (ref 34–46.6)
HDLC SERPL-MCNC: 72 MG/DL (ref 40–60)
HGB BLD-MCNC: 14.4 G/DL (ref 12–15.9)
IMM GRANULOCYTES # BLD AUTO: 0.08 10*3/MM3 (ref 0–0.05)
IMM GRANULOCYTES NFR BLD AUTO: 0.7 % (ref 0–0.5)
LDLC SERPL CALC-MCNC: 92 MG/DL (ref 0–100)
LDLC/HDLC SERPL: 1.24 {RATIO}
LYMPHOCYTES # BLD AUTO: 2.95 10*3/MM3 (ref 0.7–3.1)
LYMPHOCYTES NFR BLD AUTO: 27.6 % (ref 19.6–45.3)
MCH RBC QN AUTO: 28.7 PG (ref 26.6–33)
MCHC RBC AUTO-ENTMCNC: 32.7 G/DL (ref 31.5–35.7)
MCV RBC AUTO: 88 FL (ref 79–97)
MONOCYTES # BLD AUTO: 0.89 10*3/MM3 (ref 0.1–0.9)
MONOCYTES NFR BLD AUTO: 8.3 % (ref 5–12)
NEUTROPHILS # BLD AUTO: 6.43 10*3/MM3 (ref 1.7–7)
NEUTROPHILS NFR BLD AUTO: 60.1 % (ref 42.7–76)
NRBC BLD AUTO-RTO: 0 /100 WBC (ref 0–0.2)
PLATELET # BLD AUTO: 272 10*3/MM3 (ref 140–450)
POTASSIUM SERPL-SCNC: 4.5 MMOL/L (ref 3.5–5.2)
PROT SERPL-MCNC: 7.4 G/DL (ref 6–8.5)
RBC # BLD AUTO: 5.01 10*6/MM3 (ref 3.77–5.28)
SODIUM SERPL-SCNC: 140 MMOL/L (ref 136–145)
T3RU NFR SERPL: 22 % (ref 24–39)
T4 SERPL-MCNC: 8.4 UG/DL (ref 4.5–12)
TRIGL SERPL-MCNC: 115 MG/DL (ref 0–150)
TSH SERPL DL<=0.005 MIU/L-ACNC: 1.7 UIU/ML (ref 0.45–4.5)
VLDLC SERPL CALC-MCNC: 20 MG/DL (ref 5–40)
WBC # BLD AUTO: 10.7 10*3/MM3 (ref 3.4–10.8)

## 2021-08-09 ENCOUNTER — TELEMEDICINE (OUTPATIENT)
Dept: FAMILY MEDICINE CLINIC | Facility: CLINIC | Age: 41
End: 2021-08-09

## 2021-08-09 DIAGNOSIS — E03.9 ACQUIRED HYPOTHYROIDISM: Primary | ICD-10-CM

## 2021-08-09 PROCEDURE — 99213 OFFICE O/P EST LOW 20 MIN: CPT | Performed by: PHYSICIAN ASSISTANT

## 2021-08-09 RX ORDER — AZELASTINE 1 MG/ML
SPRAY, METERED NASAL
COMMUNITY
Start: 2021-07-13

## 2021-08-09 RX ORDER — EPINEPHRINE 0.3 MG/.3ML
INJECTION, SOLUTION INTRAMUSCULAR
COMMUNITY
Start: 2021-07-16 | End: 2022-04-15

## 2021-08-09 NOTE — PROGRESS NOTES
Chief Complaint  No chief complaint on file.    Subjective     {Problem List  Visit Diagnosis   Encounters  Notes  Medications  Labs  Result Review Imaging  Media :23}     Ni Rodríguez presents to [facility]  History of Present Illness  You have chosen to receive care through a telehealth visit.  Do you consent to use a video/audio connection for your medical care today? Yes    Ni is using [employer] video today.  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     Ni is a 41-year-old female who presents for hypothyroidism.  States she has been doing well.  Has been taking her medications as directed.  Currently doing  NOON for weight loss.  States she has been making healthier choices by eating better.  Plan to do lifestyle changes to help improve her health.  Sleep has been normal.  Denied any chest pain, shortness of air, wheezing, fevers, chills, upper respiratory symptoms, fatigue, swelling of ankles.  Has no complaints today.       Objective   Vital Signs: No vital signs were obtained due to telehealth encounter  There were no vitals taken for this visit.    Physical Exam  Constitutional:       Appearance: Normal appearance. She is well-developed and well-groomed.   HENT:      Head: Normocephalic.      Right Ear: Hearing normal.      Left Ear: Hearing normal.   Pulmonary:      Effort: Pulmonary effort is normal.      Breath sounds: Normal breath sounds.   Neurological:      Mental Status: She is alert and oriented to person, place, and time.   Psychiatric:         Attention and Perception: Attention and perception normal.         Mood and Affect: Mood and affect normal.         Speech: Speech normal.         Behavior: Behavior normal. Behavior is cooperative.         Thought Content: Thought content normal.         Cognition and Memory: Cognition and memory normal.         Judgment: Judgment normal.     Result Review :     CMP     CMP 1/18/21 7/30/21   Glucose 85 86   BUN 13 9   Creatinine 0.72 0.73   eGFR Non  Am 89 87   eGFR African Am 108 106   Sodium 138 140   Potassium 4.7 4.5   Chloride 102 105   Calcium 9.7 9.8   Total Protein 7.4 7.4   Albumin 4.30 4.00   Globulin 3.1 3.4   Total Bilirubin 0.5 0.5   Alkaline Phosphatase 62 63   AST (SGOT) 13 16   ALT (SGPT) 13 14      Comments are available for some flowsheets but are not being displayed.           CBC w/diff    CBC w/Diff 1/18/21 7/30/21   WBC 8.50 10.70   RBC 5.01 5.01   Hemoglobin 14.9 14.4   Hematocrit 43.5 44.1   MCV 86.8 88.0   MCH 29.7 28.7   MCHC 34.3 32.7   RDW 12.2 (A) 12.4   Platelets 297 272   Neutrophil Rel % 50.5 60.1   Lymphocyte Rel % 34.0 27.6   Monocyte Rel % 9.9 8.3   Eosinophil Rel % 4.2 2.6   Basophil Rel % 0.7 0.7   (A) Abnormal value            Lipid Panel    Lipid Panel 1/18/21 7/30/21   Total Cholesterol 213 (A) 184   Triglycerides 157 (A) 115   HDL Cholesterol 70 (A) 72 (A)   VLDL Cholesterol 27 20   LDL Cholesterol  116 (A) 92   LDL/HDL Ratio 1.59 1.24   (A) Abnormal value       Comments are available for some flowsheets but are not being displayed.           TSH    TSH 1/18/21 7/30/21   TSH 1.700 1.700           Physical exam was limited due to telehealth encounter.            Assessment and Plan    Diagnoses and all orders for this visit:    1. Acquired hypothyroidism (Primary)    Ni was seen for hypothyroidism management.  I have discussed above blood work with her today.  TSH was in therapeutic range.  She will continue current thyroid medication at home as directed.  In regards to her cholesterol and triglycerides readings, these have returned to normal over the past 6 months.  Ni will continue doing her current diet and lifestyle changes.  She was given encouragement and praise for lowering her cholesterol and triglycerides.  She will schedule annual physical examination next summer.    I spent 15 minutes caring for Ni on this date of  service. This time includes time spent by me in the following activities:preparing for the visit, reviewing tests, obtaining and/or reviewing a separately obtained history, performing a medically appropriate examination and/or evaluation , counseling and educating the patient/family/caregiver and documenting information in the medical record  Follow Up   No follow-ups on file.  Patient was given instructions and counseling regarding her condition or for health maintenance advice. Please see specific information pulled into the AVS if appropriate.     BELINDA Munoz [facility]  [address] [place] [phone]  Dept: [phone]  Dept Fax: [phone]  Loc: [phone]  Loc Fax: [phone]

## 2021-09-10 DIAGNOSIS — L98.9 SKIN SORE: ICD-10-CM

## 2021-11-30 DIAGNOSIS — R21 RASH AND NONSPECIFIC SKIN ERUPTION: ICD-10-CM

## 2021-12-01 RX ORDER — BENZALKONIUM CHLORIDE AND LIDOCAINE HYDROCHLORIDE 26; 25 MG/ML; MG/ML
LIQUID TOPICAL
Qty: 56 G | Refills: 0 | Status: SHIPPED | OUTPATIENT
Start: 2021-12-01

## 2022-02-14 ENCOUNTER — OFFICE VISIT (OUTPATIENT)
Dept: FAMILY MEDICINE CLINIC | Facility: CLINIC | Age: 42
End: 2022-02-14

## 2022-02-14 ENCOUNTER — LAB (OUTPATIENT)
Dept: LAB | Facility: HOSPITAL | Age: 42
End: 2022-02-14

## 2022-02-14 ENCOUNTER — HOSPITAL ENCOUNTER (OUTPATIENT)
Dept: GENERAL RADIOLOGY | Facility: HOSPITAL | Age: 42
Discharge: HOME | End: 2022-02-14

## 2022-02-14 DIAGNOSIS — R06.09 DOE (DYSPNEA ON EXERTION): ICD-10-CM

## 2022-02-14 DIAGNOSIS — R06.09 POST-COVID CHRONIC DYSPNEA: ICD-10-CM

## 2022-02-14 DIAGNOSIS — U09.9 POST-COVID CHRONIC DYSPNEA: ICD-10-CM

## 2022-02-14 DIAGNOSIS — R07.9 LEFT-SIDED CHEST PAIN: Primary | ICD-10-CM

## 2022-02-14 DIAGNOSIS — R07.9 LEFT-SIDED CHEST PAIN: ICD-10-CM

## 2022-02-14 DIAGNOSIS — R94.31 ABNORMAL EKG: ICD-10-CM

## 2022-02-14 LAB — D DIMER PPP FEU-MCNC: 0.33 MCGFEU/ML (ref 0–0.46)

## 2022-02-14 PROCEDURE — 99214 OFFICE O/P EST MOD 30 MIN: CPT | Performed by: PHYSICIAN ASSISTANT

## 2022-02-14 PROCEDURE — 93000 ELECTROCARDIOGRAM COMPLETE: CPT | Performed by: PHYSICIAN ASSISTANT

## 2022-02-14 PROCEDURE — 36415 COLL VENOUS BLD VENIPUNCTURE: CPT

## 2022-02-14 PROCEDURE — 85379 FIBRIN DEGRADATION QUANT: CPT

## 2022-02-14 PROCEDURE — 71046 X-RAY EXAM CHEST 2 VIEWS: CPT

## 2022-02-14 RX ORDER — FLUOXETINE HYDROCHLORIDE 40 MG/1
40 CAPSULE ORAL DAILY
COMMUNITY
Start: 2021-12-16

## 2022-02-14 RX ORDER — KETOCONAZOLE 20 MG/ML
SHAMPOO TOPICAL
COMMUNITY
Start: 2021-12-20

## 2022-02-14 NOTE — PROGRESS NOTES
"Chief Complaint  COVID f/u, Cough (chest tightness/pain), Shortness of Breath, Nasal Congestion, and Earache    Subjective          Ni Rodríguez presents to [facility]  History of Present Illness  Ni is a 41-year-old female who presents with follow-up on COVID with dry cough, left-sided chest pain, shortness of breath, left ear pain and nasal congestion.  States she had a positive test on January 30, 2022.  States her children also tested positive.  States she has ongoing fatigue, shortness of breath with exertion, right ear pain and left-sided chest pain.  Has been taking Mucinex and Airborne Gummies without relief of symptoms.  Denied any fevers, chills, wheezing, headache, GI upset, sore throat, loss of taste/smell, or GI upset.  States she has had increased fatigue with increased sleep.  Her diet has been slightly decreased.  Has lost 13 pounds since July 9, 2021 office visit.  Has not had her COVID booster due to neurology not recommending.  Review of Systems   Respiratory: Positive for cough, chest tightness and shortness of breath.    Cardiovascular: Positive for chest pain.   All other systems reviewed and are negative.    Objective   Vital Signs:   /80   Pulse 109   Temp 98 °F (36.7 °C)   Resp 16   Ht 172.7 cm (68\")   Wt 87.5 kg (193 lb)   SpO2 99%   BMI 29.35 kg/m²     Physical Exam  Vitals and nursing note reviewed.   Constitutional:       Appearance: Normal appearance. She is well-developed, well-groomed and overweight.      Interventions: Face mask in place.   HENT:      Head: Normocephalic and atraumatic.      Jaw: There is normal jaw occlusion.      Right Ear: Hearing, tympanic membrane, ear canal and external ear normal.      Left Ear: Hearing, tympanic membrane, ear canal and external ear normal.      Nose: Nose normal.      Right Sinus: No maxillary sinus tenderness or frontal sinus tenderness.      Left Sinus: No maxillary sinus tenderness or frontal " sinus tenderness.      Mouth/Throat:      Lips: Pink.      Mouth: Mucous membranes are moist.      Dentition: Normal dentition.      Tongue: No lesions.      Pharynx: Oropharynx is clear. Uvula midline.      Tonsils: No tonsillar exudate.   Eyes:      General: Lids are normal.      Conjunctiva/sclera: Conjunctivae normal.      Pupils: Pupils are equal, round, and reactive to light.   Neck:      Thyroid: No thyroid mass, thyromegaly or thyroid tenderness.      Vascular: No carotid bruit.      Trachea: Trachea and phonation normal. No tracheal tenderness.   Cardiovascular:      Rate and Rhythm: Normal rate and regular rhythm.      Pulses: Normal pulses.      Heart sounds: Normal heart sounds, S1 normal and S2 normal. No murmur heard.      Pulmonary:      Effort: Pulmonary effort is normal.      Breath sounds: Normal breath sounds and air entry.   Chest:      Chest wall: Tenderness present.       Abdominal:      General: Bowel sounds are normal.      Palpations: Abdomen is soft.      Tenderness: There is no abdominal tenderness. There is no right CVA tenderness, left CVA tenderness, guarding or rebound. Negative signs include Berg's sign, Rovsing's sign, McBurney's sign, psoas sign and obturator sign.   Musculoskeletal:      Cervical back: Neck supple.      Right lower leg: No edema.      Left lower leg: No edema.   Lymphadenopathy:      Cervical: No cervical adenopathy.      Right cervical: No superficial, deep or posterior cervical adenopathy.     Left cervical: No superficial, deep or posterior cervical adenopathy.   Skin:     General: Skin is warm and dry.      Capillary Refill: Capillary refill takes less than 2 seconds.   Neurological:      Mental Status: She is alert and oriented to person, place, and time.   Psychiatric:         Attention and Perception: Attention and perception normal.         Mood and Affect: Mood and affect normal.         Speech: Speech normal.         Behavior: Behavior is cooperative.          Thought Content: Thought content normal.         Cognition and Memory: Cognition and memory normal.         Judgment: Judgment normal.     I wore a facial mask, face shield, and gloves during this patient encounter.  Patient also wearing a surgical mask.  Hand hygiene performed before and after seeing the patient.     Result Review :            ECG 12 Lead    Date/Time: 2/14/2022 3:08 PM  Performed by: Miri Deshpande PA-C  Authorized by: Miri Deshpande PA-C   Comparison: not compared with previous ECG   Previous ECG: no previous ECG available  Rhythm: sinus rhythm  Rate: normal  BPM: 91  Q waves: V3 and V4    ST Segments: ST segments normal  T Waves: T waves normal  QRS axis: normal    Clinical impression: abnormal EKG  Comments: Indication: Left-sided chest pain with dyspnea with exertion status post COVID  NM int:  163 ms  QRS dur:  91 ms  QT/QTc: 376/424 ms              Assessment and Plan    Diagnoses and all orders for this visit:    1. Left-sided chest pain (Primary)  -     XR Chest PA & Lateral; Future  -     D-dimer, Quantitative; Future  -     ECG 12 Lead    2. WORLEY (dyspnea on exertion)  -     XR Chest PA & Lateral; Future  -     D-dimer, Quantitative; Future  -     ECG 12 Lead    3. Post-COVID chronic dyspnea  -     XR Chest PA & Lateral; Future  -     D-dimer, Quantitative; Future  -     ECG 12 Lead    Ni was seen  in office today with new left-sided chest pain with dyspnea with exertion status post COVID infection.  In office EKG was abnormal.  EKG showed Q waves in V 3 and V 4 which I suspect maybe old.  I will proceed with chest x-ray and D-dimer at [facility] for further evaluation.  I will consider echocardiogram and or CTA of chest in future if warranted.  She will be notified of test results when completed.  Stressed the importance if pain worsens, she will proceed to the ER.  Ni will continue her over-the-counter medications and Mucinex at home as  directed.      Follow Up   Return if symptoms worsen or fail to improve.  Patient was given instructions and counseling regarding her condition or for health maintenance advice. Please see specific information pulled into the AVS if appropriate.     [facility]  [address] [place] [id number]  Dept: [phone]  Dept Fax: [phone]  Loc: [phone]  Loc Fax: [phone]

## 2022-02-15 VITALS
RESPIRATION RATE: 16 BRPM | OXYGEN SATURATION: 99 % | BODY MASS INDEX: 29.25 KG/M2 | HEART RATE: 109 BPM | TEMPERATURE: 98 F | HEIGHT: 68 IN | DIASTOLIC BLOOD PRESSURE: 80 MMHG | WEIGHT: 193 LBS | SYSTOLIC BLOOD PRESSURE: 120 MMHG

## 2022-02-18 ENCOUNTER — HOSPITAL ENCOUNTER (OUTPATIENT)
Dept: CARDIOLOGY | Facility: HOSPITAL | Age: 42
Discharge: HOME | End: 2022-02-18
Admitting: PHYSICIAN ASSISTANT

## 2022-02-18 VITALS
BODY MASS INDEX: 29.4 KG/M2 | HEART RATE: 103 BPM | OXYGEN SATURATION: 99 % | WEIGHT: 194 LBS | HEIGHT: 68 IN | DIASTOLIC BLOOD PRESSURE: 78 MMHG | SYSTOLIC BLOOD PRESSURE: 112 MMHG

## 2022-02-18 DIAGNOSIS — R06.09 POST-COVID CHRONIC DYSPNEA: ICD-10-CM

## 2022-02-18 DIAGNOSIS — R94.31 ABNORMAL EKG: ICD-10-CM

## 2022-02-18 DIAGNOSIS — U09.9 POST-COVID CHRONIC DYSPNEA: ICD-10-CM

## 2022-02-18 DIAGNOSIS — R06.09 DOE (DYSPNEA ON EXERTION): ICD-10-CM

## 2022-02-18 DIAGNOSIS — R07.9 LEFT-SIDED CHEST PAIN: ICD-10-CM

## 2022-02-18 LAB
AORTIC ARCH: 2.1 CM
AORTIC DIMENSIONLESS INDEX: 0.6 (DI)
ASCENDING AORTA: 3 CM
BH CV ECHO MEAS - ACS: 1.9 CM
BH CV ECHO MEAS - AO ACC TIME: 0.07 SEC
BH CV ECHO MEAS - AO MAX PG: 9 MMHG
BH CV ECHO MEAS - AO MEAN PG (FULL): 2 MMHG
BH CV ECHO MEAS - AO MEAN PG: 4 MMHG
BH CV ECHO MEAS - AO ROOT AREA (BSA CORRECTED): 1.4
BH CV ECHO MEAS - AO ROOT AREA: 6.2 CM^2
BH CV ECHO MEAS - AO ROOT DIAM: 2.8 CM
BH CV ECHO MEAS - AO V2 MAX: 150 CM/SEC
BH CV ECHO MEAS - AO V2 MEAN: 92.4 CM/SEC
BH CV ECHO MEAS - AO V2 VTI: 25 CM
BH CV ECHO MEAS - ASC AORTA: 3 CM
BH CV ECHO MEAS - AVA(I,A): 2.2 CM^2
BH CV ECHO MEAS - AVA(I,D): 2.2 CM^2
BH CV ECHO MEAS - BSA(HAYCOCK): 2.1 M^2
BH CV ECHO MEAS - BSA: 2 M^2
BH CV ECHO MEAS - BZI_BMI: 29.7 KILOGRAMS/M^2
BH CV ECHO MEAS - BZI_METRIC_HEIGHT: 172 CM
BH CV ECHO MEAS - BZI_METRIC_WEIGHT: 88 KG
BH CV ECHO MEAS - EDV(CUBED): 56.2 ML
BH CV ECHO MEAS - EDV(MOD-SP2): 106 ML
BH CV ECHO MEAS - EDV(MOD-SP4): 119 ML
BH CV ECHO MEAS - EDV(TEICH): 63.1 ML
BH CV ECHO MEAS - EF(CUBED): 78.1 %
BH CV ECHO MEAS - EF(MOD-BP): 56.6 %
BH CV ECHO MEAS - EF(MOD-SP2): 57.6 %
BH CV ECHO MEAS - EF(MOD-SP4): 56.1 %
BH CV ECHO MEAS - EF(TEICH): 71 %
BH CV ECHO MEAS - ESV(CUBED): 12.3 ML
BH CV ECHO MEAS - ESV(MOD-SP2): 44.9 ML
BH CV ECHO MEAS - ESV(MOD-SP4): 52.3 ML
BH CV ECHO MEAS - ESV(TEICH): 18.3 ML
BH CV ECHO MEAS - FS: 39.7 %
BH CV ECHO MEAS - IVS/LVPW: 0.72
BH CV ECHO MEAS - IVSD: 0.83 CM
BH CV ECHO MEAS - LAT PEAK E' VEL: 11.1 CM/SEC
BH CV ECHO MEAS - LV DIASTOLIC VOL/BSA (35-75): 59.2 ML/M^2
BH CV ECHO MEAS - LV MASS(C)D: 117.1 GRAMS
BH CV ECHO MEAS - LV MASS(C)DI: 58.2 GRAMS/M^2
BH CV ECHO MEAS - LV MAX PG: 4.2 MMHG
BH CV ECHO MEAS - LV MEAN PG: 2 MMHG
BH CV ECHO MEAS - LV SYSTOLIC VOL/BSA (12-30): 26 ML/M^2
BH CV ECHO MEAS - LV V1 MAX: 102 CM/SEC
BH CV ECHO MEAS - LV V1 MEAN: 73.5 CM/SEC
BH CV ECHO MEAS - LV V1 VTI: 17.7 CM
BH CV ECHO MEAS - LVIDD: 3.8 CM
BH CV ECHO MEAS - LVIDS: 2.3 CM
BH CV ECHO MEAS - LVLD AP2: 8.8 CM
BH CV ECHO MEAS - LVLD AP4: 9.5 CM
BH CV ECHO MEAS - LVLS AP2: 6.5 CM
BH CV ECHO MEAS - LVLS AP4: 7.2 CM
BH CV ECHO MEAS - LVOT AREA (M): 3.1 CM^2
BH CV ECHO MEAS - LVOT AREA: 3.1 CM^2
BH CV ECHO MEAS - LVOT DIAM: 2 CM
BH CV ECHO MEAS - LVPWD: 1.2 CM
BH CV ECHO MEAS - MED PEAK E' VEL: 7.7 CM/SEC
BH CV ECHO MEAS - MV A DUR: 0.09 SEC
BH CV ECHO MEAS - MV A MAX VEL: 74.1 CM/SEC
BH CV ECHO MEAS - MV DEC SLOPE: 414 CM/SEC^2
BH CV ECHO MEAS - MV DEC TIME: 185 SEC
BH CV ECHO MEAS - MV E MAX VEL: 78.8 CM/SEC
BH CV ECHO MEAS - MV E/A: 1.1
BH CV ECHO MEAS - MV MEAN PG: 2 MMHG
BH CV ECHO MEAS - MV P1/2T MAX VEL: 81.7 CM/SEC
BH CV ECHO MEAS - MV P1/2T: 57.8 MSEC
BH CV ECHO MEAS - MV V2 MEAN: 59.2 CM/SEC
BH CV ECHO MEAS - MV V2 VTI: 20.2 CM
BH CV ECHO MEAS - MVA P1/2T LCG: 2.7 CM^2
BH CV ECHO MEAS - MVA(P1/2T): 3.8 CM^2
BH CV ECHO MEAS - MVA(VTI): 2.8 CM^2
BH CV ECHO MEAS - PA ACC TIME: 0.11 SEC
BH CV ECHO MEAS - PA MAX PG: 3.3 MMHG
BH CV ECHO MEAS - PA PR(ACCEL): 29.1 MMHG
BH CV ECHO MEAS - PA V2 MAX: 90 CM/SEC
BH CV ECHO MEAS - PULM A REVS VEL: 45.8 CM/SEC
BH CV ECHO MEAS - PULM DIAS VEL: 45.8 CM/SEC
BH CV ECHO MEAS - PULM S/D: 1.2
BH CV ECHO MEAS - PULM SYS VEL: 56.1 CM/SEC
BH CV ECHO MEAS - QP/QS: 0.75
BH CV ECHO MEAS - RAP SYSTOLE: 3 MMHG
BH CV ECHO MEAS - RV MEAN PG: 1 MMHG
BH CV ECHO MEAS - RV V1 MEAN: 37.8 CM/SEC
BH CV ECHO MEAS - RV V1 VTI: 9.2 CM
BH CV ECHO MEAS - RVOT AREA: 4.5 CM^2
BH CV ECHO MEAS - RVOT DIAM: 2.4 CM
BH CV ECHO MEAS - SI(AO): 76.6 ML/M^2
BH CV ECHO MEAS - SI(CUBED): 21.8 ML/M^2
BH CV ECHO MEAS - SI(LVOT): 27.6 ML/M^2
BH CV ECHO MEAS - SI(MOD-SP2): 30.4 ML/M^2
BH CV ECHO MEAS - SI(MOD-SP4): 33.2 ML/M^2
BH CV ECHO MEAS - SI(TEICH): 22.3 ML/M^2
BH CV ECHO MEAS - SUP REN AO DIAM: 1.8 CM
BH CV ECHO MEAS - SV(AO): 154.2 ML
BH CV ECHO MEAS - SV(CUBED): 43.9 ML
BH CV ECHO MEAS - SV(LVOT): 55.6 ML
BH CV ECHO MEAS - SV(MOD-SP2): 61.1 ML
BH CV ECHO MEAS - SV(MOD-SP4): 66.7 ML
BH CV ECHO MEAS - SV(RVOT): 41.7 ML
BH CV ECHO MEAS - SV(TEICH): 44.8 ML
BH CV ECHO MEAS - TAPSE (>1.6): 2.8 CM
BH CV ECHO MEASUREMENTS AVERAGE E/E' RATIO: 8.38
BH CV VAS BP LEFT ARM: NORMAL MMHG
BH CV XLRA - RV BASE: 3.9 CM
BH CV XLRA - RV LENGTH: 7.1 CM
BH CV XLRA - RV MID: 3.3 CM
BH CV XLRA - TDI S': 12.9 CM/SEC
LEFT ATRIUM VOLUME INDEX: 19.4 ML/M2
SINUS: 2.9 CM
STJ: 2.6 CM

## 2022-02-18 PROCEDURE — 93306 TTE W/DOPPLER COMPLETE: CPT | Performed by: INTERNAL MEDICINE

## 2022-02-18 PROCEDURE — 93306 TTE W/DOPPLER COMPLETE: CPT

## 2022-02-21 ENCOUNTER — TELEPHONE (OUTPATIENT)
Dept: FAMILY MEDICINE CLINIC | Facility: CLINIC | Age: 42
End: 2022-02-21

## 2022-02-21 DIAGNOSIS — R07.9 LEFT-SIDED CHEST PAIN: Primary | ICD-10-CM

## 2022-02-21 DIAGNOSIS — R06.09 DOE (DYSPNEA ON EXERTION): ICD-10-CM

## 2022-02-21 NOTE — TELEPHONE ENCOUNTER
I spoke with Ni on February 21, 2022 at 12:30 PM.  Have discussed her echocardiogram results.  Since she is still having symptoms, will send to cardiology.

## 2022-02-21 NOTE — TELEPHONE ENCOUNTER
Caller: Ni Rodríguez    Relationship: Self    Best call back number: [phone]    What is the best time to reach you: ANY     Who are you requesting to speak with (clinical staff, provider,  specific staff member): CLINICAL STAFF     Do you know the name of the person who called: PATIENT     What was the call regarding: PATIENT HAD A ECHO CARDIOGRAM ON Friday 2/18/2022 AND WANTS TO KNOW BERNARDO ROGERS NEXT STEPS SHE WANTS TO TAKE ARE . PATIENT HAS VIEWED THE RESULTS FROM MY CHART.   Do you require a callback: YES

## 2022-02-23 ENCOUNTER — OFFICE VISIT (OUTPATIENT)
Dept: CARDIOLOGY | Facility: CLINIC | Age: 42
End: 2022-02-23

## 2022-02-23 VITALS
HEART RATE: 94 BPM | HEIGHT: 68 IN | DIASTOLIC BLOOD PRESSURE: 100 MMHG | SYSTOLIC BLOOD PRESSURE: 128 MMHG | BODY MASS INDEX: 30.43 KG/M2 | WEIGHT: 200.8 LBS

## 2022-02-23 DIAGNOSIS — R06.09 POST-COVID CHRONIC DYSPNEA: Primary | ICD-10-CM

## 2022-02-23 DIAGNOSIS — U09.9 POST-COVID CHRONIC DYSPNEA: Primary | ICD-10-CM

## 2022-02-23 PROCEDURE — 99204 OFFICE O/P NEW MOD 45 MIN: CPT | Performed by: INTERNAL MEDICINE

## 2022-02-23 PROCEDURE — 93000 ELECTROCARDIOGRAM COMPLETE: CPT | Performed by: INTERNAL MEDICINE

## 2022-02-23 NOTE — PROGRESS NOTES
Date of Office Visit: 22  Encounter Provider: Shashi Walter MD  Place of Service: [facility] CARDIOLOGY  Patient Name: Ni Rodríguez  :1980  [id number]    Chief Complaint   Patient presents with   • Chest Pain   :     HPI: Ni Rodríguez is a 41 y.o. female she is coming here because she is having some chest pain really nice lady she had COVID 3 weeks ago still has a cough little bit of dyspnea on exertion no fevers chills or sweats she had 2 vaccines did not get the booster of the second vaccine really exacerbated a bunch of concussion symptoms she had.  She is been having a little bit of discomfort in her left chest like under her left breast and that goes around to her back and when she presses on it she can it makes it a little bit better sometimes it is difficult for her to get comfortable at night when she sleeps.  She is an .  She does not smoke does not have diabetes does not drink does not have hyperlipidemia or hypertension.  She otherwise has been healthy    Past Medical History:   Diagnosis Date   • Bright red blood per rectum    • Disease of thyroid gland    • Environmental allergies    • History of prior pregnancies      3   • Miscarriage     X 1   • URI (upper respiratory infection)        History reviewed. No pertinent surgical history.    Social History     Socioeconomic History   • Marital status:    • Number of children: 2   • Years of education: masters in education   Tobacco Use   • Smoking status: Never Smoker   • Smokeless tobacco: Never Used   Vaping Use   • Vaping Use: Never used   Substance and Sexual Activity   • Alcohol use: Yes     Alcohol/week: 1.0 standard drink     Types: 1 Glasses of wine per week   • Drug use: No   • Sexual activity: Yes     Partners: Male     Birth control/protection: OCP       Family History   Problem Relation Age of Onset   • Arthritis Mother         Osteoarthritis   • Thyroid disease  Mother    • Hypertension Father    • Pancreatitis Father         GB complication - 1/2 removed   • Hyperlipidemia Father    • Diabetes Father    • Other Sister         Hypoglycemia   • Hypothyroidism Brother    • Uterine cancer Maternal Grandmother    • Alzheimer's disease Maternal Grandfather    • Prostate cancer Maternal Grandfather    • Arthritis Paternal Grandmother         Osteoarthritis   • Prostate cancer Paternal Grandfather        Review of Systems   Constitutional: Negative for decreased appetite, fever, malaise/fatigue and weight loss.   HENT: Negative for nosebleeds.    Eyes: Negative for double vision.   Cardiovascular: Negative for chest pain, claudication, cyanosis, dyspnea on exertion, irregular heartbeat, leg swelling, near-syncope, orthopnea, palpitations, paroxysmal nocturnal dyspnea and syncope.   Respiratory: Negative for cough, hemoptysis and shortness of breath.    Hematologic/Lymphatic: Negative for bleeding problem.   Skin: Negative for rash.   Musculoskeletal: Negative for falls and myalgias.   Gastrointestinal: Negative for hematochezia, jaundice, melena, nausea and vomiting.   Genitourinary: Negative for hematuria.   Neurological: Negative for dizziness and seizures.   Psychiatric/Behavioral: Negative for altered mental status and memory loss.       No Known Allergies      Current Outpatient Medications:   •  albuterol sulfate  (90 Base) MCG/ACT inhaler, INHALE 2 PUFFS EVERY 4 (FOUR) HOURS AS NEEDED FOR WHEEZING OR SHORTNESS OF AIR., Disp: 18 g, Rfl: 3  •  Auvi-Q 0.3 MG/0.3ML solution auto-injector injection, , Disp: , Rfl:   •  azelastine (ASTELIN) 0.1 % nasal spray, , Disp: , Rfl:   •  [employer] Cortisone Maximum Strength 1 % ointment, APPLY TOPICALLY TO THE APPROPRIATE AREA AS DIRECTED 2 (TWO) TIMES A DAY., Disp: 56 g, Rfl: 0  •  diclofenac (VOLTAREN) 75 MG EC tablet, Take 75 mg by mouth 2 (Two) Times a Day., Disp: , Rfl:   •  drospirenone-ethinyl estradiol (MATT,OCELLA) 3-0.03 MG  "per tablet, Take by mouth., Disp: , Rfl:   •  Erenumab-aooe (Aimovig) 140 MG/ML prefilled syringe, Inject 140 mg under the skin into the appropriate area as directed., Disp: , Rfl:   •  fexofenadine (ALLEGRA) 180 MG tablet, Take 180 mg by mouth Daily., Disp: , Rfl:   •  FLUoxetine (PROzac) 40 MG capsule, Take 40 mg by mouth Daily., Disp: , Rfl:   •  ketoconazole (NIZORAL) 2 % shampoo, SHAMPOO SCALP EVERY OTHER DAY, Disp: , Rfl:   •  mupirocin (BACTROBAN) 2 % ointment, APPLY TOPICALLY TO THE APPROPRIATE AREA AS DIRECTED 3 (THREE) TIMES A DAY., Disp: 22 g, Rfl: 0  •  Rimegepant Sulfate (NURTEC) 75 MG tablet dispersible tablet, Take 1 tablet by mouth Every Other Day., Disp: , Rfl:   •  Synthroid 50 MCG tablet, Take 1 tablet by mouth Daily. MARILYN/DNS, Disp: 90 tablet, Rfl: 3      Objective:     Vitals:    02/23/22 1439   BP: 128/100   Pulse: 94   Weight: 91.1 kg (200 lb 12.8 oz)   Height: 172.7 cm (68\")     Body mass index is 30.53 kg/m².    Constitutional:       Appearance: Well-developed.   Eyes:      General: No scleral icterus.  HENT:      Head: Normocephalic.   Neck:      Thyroid: No thyromegaly.      Vascular: No JVD.      Lymphadenopathy: No cervical adenopathy.   Pulmonary:      Effort: Pulmonary effort is normal.      Breath sounds: Normal breath sounds. No wheezing. No rales.   Cardiovascular:      Normal rate. Regular rhythm.      No gallop.   Edema:     Peripheral edema absent.   Abdominal:      Palpations: Abdomen is soft.      Tenderness: There is no abdominal tenderness.   Musculoskeletal: Normal range of motion. Skin:     General: Skin is warm and dry.      Findings: No rash.   Neurological:      Mental Status: Alert and oriented to person, place, and time.           ECG 12 Lead    Date/Time: 2/23/2022 3:51 PM  Performed by: Shashi Walter MD  Authorized by: Shashi Walter MD   Comparison: compared with previous ECG   Similar to previous ECG  Rhythm: sinus rhythm    Clinical impression: normal " ECG             Assessment:       Diagnosis Plan   1. Post-COVID chronic dyspnea            Plan:       Well I think she just is still recovering from her Covid she still has a cough her chest pain is musculoskeletal.  Her EKG is normal she had an echo that is also normal she is very low risk in this staff is worse if she pushes in on it the ECG at the outside clinic the lead placement was not great so because the computer reading to be off her twelve-lead is totally normal and her exam is normal I think she can kind of gradually work her way back into exercising but really wants her cough goes away then she could probably try to really exercise a little bit harder I spent a lot of time talking with her about diet just in general I think she needs to get a little bit smaller I will just have her come back and see us as needed    Return if symptoms worsen or fail to improve.     As always, it has been a pleasure to participate in your patient's care.      Sincerely,       Shashi Walter MD

## 2022-02-28 DIAGNOSIS — U09.9 POST-COVID CHRONIC DYSPNEA: Primary | ICD-10-CM

## 2022-02-28 DIAGNOSIS — R06.09 POST-COVID CHRONIC DYSPNEA: Primary | ICD-10-CM

## 2022-03-09 DIAGNOSIS — E03.9 ACQUIRED HYPOTHYROIDISM: ICD-10-CM

## 2022-03-09 RX ORDER — LEVOTHYROXINE SODIUM 50 MCG
50 TABLET ORAL DAILY
Qty: 90 TABLET | Refills: 0 | Status: SHIPPED | OUTPATIENT
Start: 2022-03-09 | End: 2022-05-31

## 2022-03-16 ENCOUNTER — TELEPHONE (OUTPATIENT)
Dept: FAMILY MEDICINE CLINIC | Facility: CLINIC | Age: 42
End: 2022-03-16

## 2022-03-16 NOTE — TELEPHONE ENCOUNTER
I spoke with Ni on March 16, 2020 5:15 PM.  Have discussed her ongoing Covid fatigue.  She is seeing some improvement the last day with doing physical therapy.  She is trying to rest is much as possible.  She is going to work 4 days a week till spring break.  She is hoping after spring break her symptoms have improved.  States she just gets extremely fatigued and tired.  I have asked her to start over-the-counter multivitamin with iron, vitamin D and vitamin B complex to see if this will help with some of her fatigue symptoms.  She will return to office for reevaluation if symptoms do not improve in the next few weeks.  She voiced understanding.

## 2022-04-15 ENCOUNTER — OFFICE VISIT (OUTPATIENT)
Dept: FAMILY MEDICINE CLINIC | Facility: CLINIC | Age: 42
End: 2022-04-15

## 2022-04-15 VITALS
HEIGHT: 68 IN | BODY MASS INDEX: 30.01 KG/M2 | WEIGHT: 198 LBS | OXYGEN SATURATION: 100 % | SYSTOLIC BLOOD PRESSURE: 130 MMHG | RESPIRATION RATE: 15 BRPM | TEMPERATURE: 98.1 F | DIASTOLIC BLOOD PRESSURE: 84 MMHG | HEART RATE: 84 BPM

## 2022-04-15 DIAGNOSIS — G93.32 COVID-19 LONG HAULER MANIFESTING CHRONIC FATIGUE: Primary | ICD-10-CM

## 2022-04-15 DIAGNOSIS — U09.9 COVID-19 LONG HAULER MANIFESTING CHRONIC FATIGUE: Primary | ICD-10-CM

## 2022-04-15 PROCEDURE — 99213 OFFICE O/P EST LOW 20 MIN: CPT | Performed by: PHYSICIAN ASSISTANT

## 2022-04-15 NOTE — PROGRESS NOTES
Chief Complaint  Fatigue (Post COVID-long hauler)    Subjective          Ni Rodríguez presents to [facility] PR[facility]  History of Present Illness   Ni is a 41 year old female who presents for chronic fatigue due to status post COVID infection.  Ni was diagnosed with COVID-19 at the end of January 2022.  States she has had issues with recuperating.  She has had chest pressure, cough, wheezing and fatigue.  Has had work-up for cardiology, pulmonary embolism and pneumonia.  Test results have all came back normal.  She has been doing physical therapy at [facility] at least twice weekly.  She is improving with her stamina.  Her cough, chest pain, chest pressure and shortness of breath have improved.  She has noticed since she is returned back to work full-time she gets tired very easily.  All she wants to do is sleep.  She was working 4 full days and 1/2-day.  States this seems to be too much for her at this time.  She was on vacation last week and rested the whole week but the first few days back at school, her fatigue increased twofold.  Ni was seen at the post Covid clinic at [facility] earlier this week.  They explained to her that it would take a while for her to recuperate to 100%.  She was instructed to continue the things she is currently doing.  Ni states she is trying to eat healthier.  Trying to sleep as much as she can.  Has a supportive family at home.  Currently denied any chest pain, wheezing, shortness of breath or fever/chills.  Ni states she has had a slight cough earlier this week with a postnasal drip.  Recently returned from [place] on [place], April 10, 2022.  Ni states she took it easy on her vacation.  Review of Systems   Constitutional: Positive for fatigue.   All other systems reviewed and are negative.    Objective   Vital Signs:   /84 (BP Location: Left arm, Patient Position: Sitting, Cuff Size: Adult)   Pulse 84   Temp 98.1 °F  "(36.7 °C) (Infrared)   Resp 15   Ht 172.7 cm (68\")   Wt 89.8 kg (198 lb)   SpO2 100%   BMI 30.11 kg/m²     Physical Exam  Vitals and nursing note reviewed.   Constitutional:       Appearance: Normal appearance. She is well-developed and well-groomed. She is obese.      Interventions: Face mask in place.   HENT:      Head: Normocephalic and atraumatic.      Jaw: There is normal jaw occlusion.      Right Ear: Hearing, tympanic membrane, ear canal and external ear normal.      Left Ear: Hearing, tympanic membrane, ear canal and external ear normal.      Nose: Nose normal.      Right Sinus: No maxillary sinus tenderness or frontal sinus tenderness.      Left Sinus: No maxillary sinus tenderness or frontal sinus tenderness.      Mouth/Throat:      Lips: Pink.      Mouth: Mucous membranes are moist.      Dentition: Normal dentition.      Tongue: No lesions.      Pharynx: Oropharynx is clear. Uvula midline.      Tonsils: No tonsillar exudate.   Eyes:      General: Lids are normal.      Conjunctiva/sclera: Conjunctivae normal.      Pupils: Pupils are equal, round, and reactive to light.   Neck:      Thyroid: No thyroid mass, thyromegaly or thyroid tenderness.      Vascular: No carotid bruit.      Trachea: Trachea and phonation normal. No tracheal tenderness.   Cardiovascular:      Rate and Rhythm: Normal rate and regular rhythm.      Pulses: Normal pulses.      Heart sounds: Normal heart sounds, S1 normal and S2 normal. No murmur heard.  Pulmonary:      Effort: Pulmonary effort is normal.      Breath sounds: Normal breath sounds and air entry.   Abdominal:      General: Bowel sounds are normal.      Palpations: Abdomen is soft.      Tenderness: There is no abdominal tenderness. There is no right CVA tenderness, left CVA tenderness, guarding or rebound. Negative signs include Berg's sign, Rovsing's sign, McBurney's sign, psoas sign and obturator sign.   Musculoskeletal:      Cervical back: Neck supple.      Right " lower leg: No edema.      Left lower leg: No edema.   Lymphadenopathy:      Cervical: No cervical adenopathy.      Right cervical: No superficial, deep or posterior cervical adenopathy.     Left cervical: No superficial, deep or posterior cervical adenopathy.   Skin:     General: Skin is warm and dry.      Capillary Refill: Capillary refill takes less than 2 seconds.   Neurological:      Mental Status: She is alert and oriented to person, place, and time.      Deep Tendon Reflexes:      Reflex Scores:       Patellar reflexes are 2+ on the right side and 2+ on the left side.  Psychiatric:         Attention and Perception: Attention and perception normal.         Mood and Affect: Mood and affect normal.         Speech: Speech normal.         Behavior: Behavior is cooperative.         Thought Content: Thought content normal.         Cognition and Memory: Cognition and memory normal.         Judgment: Judgment normal.     I wore a facial mask, face shield, and gloves during this patient encounter.  Patient also wearing a surgical mask.  Hand hygiene performed before and after seeing the patient.     Result Review :          SCANNED EKG (02/23/2022)   Adult Transthoracic Echo Complete W/ Cont if Necessary Per Protocol (02/18/2022 15:50)     XR Chest PA & Lateral (02/14/2022 16:00)            Assessment and Plan    Diagnoses and all orders for this visit:    1. COVID-19 long hadank manifesting chronic fatigue (Primary)    Ni was seen in office today for continuing COVID-19 long kj with chronic fatigue.  I have reviewed her above EKG, echocardiogram and chest x-ray results from February 2022 with her at office visit today.  She has also seen cardiologist and was cleared. I have reviewed her physical therapy notes that was performed this morning with her.  I have also reviewed her post Covid clinic office visit earlier this week at [facility].  I have discussed with Ni that it is going to take a while  for her to recuperate 100% from Covid.  She needs to do things in moderation.  She will continue to eat healthy, drink plenty of water and take her vitamins for now.  Stressed the importance of staying active and keeping her physical therapy appointments.  I have written her a work note to work three full days weekly and two half days a week.  Will reevaluate at office visit in 3 weeks.      Follow Up   Return in about 3 weeks (around 5/6/2022), or for POST COVID long hauler fatigue.  Patient was given instructions and counseling regarding her condition or for health maintenance advice. Please see specific information pulled into the AVS if appropriate.     BELINDA Munoz [facility]  [address] [place] [id number]  Dept: [phone]  Dept Fax: [phone]  Loc: [phone]  Loc Fax: [phone]        Answers for HPI/ROS submitted by the patient on 4/12/2022  Please describe your symptoms.: Covid check in  Have you had these symptoms before?: Yes  How long have you been having these symptoms?: Greater than 2 weeks  What is the primary reason for your visit?: Other

## 2022-05-04 ENCOUNTER — OFFICE VISIT (OUTPATIENT)
Dept: FAMILY MEDICINE CLINIC | Facility: CLINIC | Age: 42
End: 2022-05-04

## 2022-05-04 VITALS
DIASTOLIC BLOOD PRESSURE: 80 MMHG | WEIGHT: 201 LBS | TEMPERATURE: 97.5 F | BODY MASS INDEX: 30.46 KG/M2 | HEIGHT: 68 IN | RESPIRATION RATE: 14 BRPM | OXYGEN SATURATION: 99 % | HEART RATE: 83 BPM | SYSTOLIC BLOOD PRESSURE: 120 MMHG

## 2022-05-04 DIAGNOSIS — R06.09 POST-COVID CHRONIC DYSPNEA: Primary | ICD-10-CM

## 2022-05-04 DIAGNOSIS — U09.9 POST-COVID SYNDROME: ICD-10-CM

## 2022-05-04 DIAGNOSIS — U09.9 POST-COVID CHRONIC DYSPNEA: Primary | ICD-10-CM

## 2022-05-04 PROCEDURE — 99213 OFFICE O/P EST LOW 20 MIN: CPT | Performed by: PHYSICIAN ASSISTANT

## 2022-05-04 NOTE — PROGRESS NOTES
"Chief Complaint  covid long hauler manifesting chronic fatigue    Subjective          Ni Rodríguez presents to [facility]  History of Present Illness  Ni is a 41 year old female who presents seen in office today for post-COVID fatigue/shortness of breath.  Still continues to do physical therapy at least once weekly.  Ni has seen a slight improvement with her decreased work schedule.  Her depression has been a little increased this week.  She thinks this may be situational.  Has been taking her Prozac 40 mg as instructed.  States she has been trying in to do some things but also needs to nap.  Her breathing is improving.  States her headache has been worse this week cyst but suspect it may be weather related.  Her diet and sleep are improving as well.  Still has some brain fog issues.  Denied any current chest pain, breathing difficulties, wheezing, or upper respiratory symptoms.  Review of Systems   Constitutional: Positive for fatigue.   Respiratory: Positive for shortness of breath.    All other systems reviewed and are negative.    Objective   Vital Signs:   /80   Pulse 83   Temp 97.5 °F (36.4 °C)   Resp 14   Ht 172.7 cm (68\")   Wt 91.2 kg (201 lb)   SpO2 99%   BMI 30.56 kg/m²     Physical Exam  Vitals and nursing note reviewed.   Constitutional:       Appearance: Normal appearance. She is well-developed and well-groomed. She is obese.      Interventions: Face mask in place.   HENT:      Head: Normocephalic and atraumatic.   Neck:      Thyroid: No thyroid mass, thyromegaly or thyroid tenderness.      Vascular: No carotid bruit.      Trachea: Trachea and phonation normal. No tracheal tenderness.   Cardiovascular:      Rate and Rhythm: Normal rate and regular rhythm.      Pulses: Normal pulses.      Heart sounds: Normal heart sounds, S1 normal and S2 normal. No murmur heard.  Pulmonary:      Effort: Pulmonary effort is normal.      Breath sounds: Normal breath " sounds and air entry.   Abdominal:      General: Bowel sounds are normal.      Palpations: Abdomen is soft. There is no hepatomegaly.      Tenderness: There is no abdominal tenderness. There is no right CVA tenderness, left CVA tenderness, guarding or rebound. Negative signs include Berg's sign, Rovsing's sign, McBurney's sign, psoas sign and obturator sign.   Musculoskeletal:      Cervical back: Neck supple.      Right lower leg: No edema.      Left lower leg: No edema.   Skin:     General: Skin is warm and dry.      Capillary Refill: Capillary refill takes less than 2 seconds.   Neurological:      Mental Status: She is alert and oriented to person, place, and time.   Psychiatric:         Attention and Perception: Attention and perception normal.         Mood and Affect: Mood and affect normal.         Speech: Speech normal.         Behavior: Behavior normal. Behavior is cooperative.         Thought Content: Thought content normal.         Cognition and Memory: Cognition and memory normal.         Judgment: Judgment normal.     I wore a facial mask, face shield, and gloves during this patient encounter.  Patient also wearing a surgical mask.  Hand hygiene performed before and after seeing the patient.     Result Review :                 Assessment and Plan    Diagnoses and all orders for this visit:    1. Post-COVID chronic dyspnea (Primary)    Ni was seen in office today for chronic and slight improving post-COVID syndrome/dyspnea.  She will continue her physical therapy for post-COVID syndrome.  I have given her reduced work schedule for the next 3 weeks.  Stressed the importance of continued to increase physical activity to help improve her post-COVID syndrome.  Will stay hydrated and take over-the-counter vitamins and instructed.  Will return to office in 6 weeks.    Follow Up   Return in about 6 weeks (around 6/15/2022), or post COVID.  Patient was given instructions and counseling regarding her condition  or for health maintenance advice. Please see specific information pulled into the AVS if appropriate.     BELINDA Munoz [address] [address]  Dept: [phone]  Dept Fax: [phone]  Loc: [phone]  Loc Fax: [phone]        Answers for HPI/ROS submitted by the patient on 5/3/2022  Please describe your symptoms.: Covid long haul check in  Have you had these symptoms before?: Yes  How long have you been having these symptoms?: Greater than 2 weeks  What is the primary reason for your visit?: Other

## 2022-05-28 DIAGNOSIS — E03.9 ACQUIRED HYPOTHYROIDISM: ICD-10-CM

## 2022-05-28 DIAGNOSIS — J20.9 ACUTE BRONCHITIS, UNSPECIFIED ORGANISM: ICD-10-CM

## 2022-05-28 DIAGNOSIS — J06.9 ACUTE URI: ICD-10-CM

## 2022-05-31 RX ORDER — LEVOTHYROXINE SODIUM 50 MCG
TABLET ORAL
Qty: 90 TABLET | Refills: 0 | Status: SHIPPED | OUTPATIENT
Start: 2022-05-31 | End: 2022-08-29

## 2022-05-31 RX ORDER — ALBUTEROL SULFATE 90 UG/1
2 AEROSOL, METERED RESPIRATORY (INHALATION) EVERY 4 HOURS PRN
Qty: 18 G | Refills: 3 | Status: SHIPPED | OUTPATIENT
Start: 2022-05-31 | End: 2023-01-24

## 2022-06-02 PROBLEM — Z00.00 ANNUAL PHYSICAL EXAM: Status: ACTIVE | Noted: 2022-06-02

## 2022-06-03 ENCOUNTER — OFFICE VISIT (OUTPATIENT)
Dept: FAMILY MEDICINE CLINIC | Facility: CLINIC | Age: 42
End: 2022-06-03

## 2022-06-03 VITALS
OXYGEN SATURATION: 99 % | TEMPERATURE: 97.1 F | DIASTOLIC BLOOD PRESSURE: 78 MMHG | SYSTOLIC BLOOD PRESSURE: 102 MMHG | HEART RATE: 100 BPM | BODY MASS INDEX: 30.31 KG/M2 | WEIGHT: 200 LBS | HEIGHT: 68 IN | RESPIRATION RATE: 15 BRPM

## 2022-06-03 DIAGNOSIS — Z01.419 ENCOUNTER FOR WELL WOMAN EXAM: ICD-10-CM

## 2022-06-03 DIAGNOSIS — Z00.00 ANNUAL PHYSICAL EXAM: Primary | ICD-10-CM

## 2022-06-03 DIAGNOSIS — E03.9 ACQUIRED HYPOTHYROIDISM: ICD-10-CM

## 2022-06-03 DIAGNOSIS — U09.9 POST-COVID SYNDROME: ICD-10-CM

## 2022-06-03 LAB
DEVELOPER EXPIRATION DATE: NORMAL
DEVELOPER LOT NUMBER: NORMAL
EXPIRATION DATE: NORMAL
FECAL OCCULT BLOOD SCREEN, POC: NEGATIVE
Lab: NORMAL
NEGATIVE CONTROL: NEGATIVE
POSITIVE CONTROL: POSITIVE

## 2022-06-03 PROCEDURE — 82270 OCCULT BLOOD FECES: CPT | Performed by: PHYSICIAN ASSISTANT

## 2022-06-03 PROCEDURE — 99396 PREV VISIT EST AGE 40-64: CPT | Performed by: PHYSICIAN ASSISTANT

## 2022-06-03 RX ORDER — CLINDAMYCIN PHOSPHATE 10 MG/G
GEL TOPICAL
COMMUNITY
Start: 2022-05-16

## 2022-06-03 RX ORDER — DEXAMETHASONE 1 MG
TABLET ORAL
COMMUNITY
Start: 2022-05-05 | End: 2022-07-25

## 2022-06-03 NOTE — PROGRESS NOTES
"Chief Complaint  Annual Exam    Subjective          Ni Rodríguez presents to [facility]  History of Present Illness  Ni is a 41 year old female who presents for annual physical examination with Hypothyrodism management.  Ni states she has recently been released from physical therapy for post-COVID symptoms.  She has been going to the [facility] several times weekly to exercise.  Currently going to [place] speech therapy for her post-COVID syndrome.  States she is seeing improvement with her thought process as well as speech.  Her brain fog is improving slightly.  Overall she has more energy and has been trying to do more things.  Bowel movements have been daily without dark black tarry stools.  Sleep has been normal.  Not taking as long of naps.  Woke up this morning with yellowish rhinorrhea and slight dry cough.  Denied any fevers, chills, headache, sinus pressure, wheezing, change in shortness of breath, abdominal pain, nausea, vomiting, diarrhea, urinary symptoms, vaginal discharge or swelling of ankles.  Does not do breast exams regularly at home.    Review of Systems   Constitutional: Positive for fatigue.   HENT: Positive for rhinorrhea.    Respiratory: Positive for cough. Negative for chest tightness and wheezing.    Cardiovascular: Negative for chest pain, palpitations and leg swelling.   Psychiatric/Behavioral: Negative for sleep disturbance.   All other systems reviewed and are negative.    Objective   Vital Signs:   /78   Pulse 100   Temp 97.1 °F (36.2 °C)   Resp 15   Ht 172.7 cm (68\")   Wt 90.7 kg (200 lb)   SpO2 99%   BMI 30.41 kg/m²     Physical Exam  Vitals and nursing note reviewed. Exam conducted with a chaperone present.   Constitutional:       Appearance: Normal appearance. She is well-developed and well-groomed. She is obese.      Interventions: Face mask in place.      Comments: Wears bilateral hearing aids.   HENT:      Head: Normocephalic and " atraumatic.      Jaw: There is normal jaw occlusion.      Right Ear: Hearing, tympanic membrane, ear canal and external ear normal.      Left Ear: Hearing, tympanic membrane, ear canal and external ear normal.      Nose: Nose normal.      Right Sinus: No maxillary sinus tenderness or frontal sinus tenderness.      Left Sinus: No maxillary sinus tenderness or frontal sinus tenderness.      Mouth/Throat:      Lips: Pink.      Mouth: Mucous membranes are moist.      Tongue: No lesions.      Palate: No mass.      Pharynx: Oropharynx is clear. Uvula midline.   Eyes:      General: Lids are normal.      Conjunctiva/sclera: Conjunctivae normal.      Pupils: Pupils are equal, round, and reactive to light.   Neck:      Thyroid: No thyroid mass, thyromegaly or thyroid tenderness.      Vascular: No carotid bruit.      Trachea: Trachea and phonation normal. No tracheal tenderness.   Cardiovascular:      Rate and Rhythm: Normal rate and regular rhythm.      Pulses: Normal pulses.      Heart sounds: Normal heart sounds, S1 normal and S2 normal. No murmur heard.  Pulmonary:      Effort: Pulmonary effort is normal.      Breath sounds: Normal breath sounds and air entry.   Chest:   Breasts: Breasts are symmetrical.      Right: Normal. No swelling, bleeding, inverted nipple, mass, nipple discharge, skin change, tenderness or axillary adenopathy.      Left: Normal. No swelling, bleeding, inverted nipple, mass, nipple discharge, skin change, tenderness or axillary adenopathy.       Abdominal:      General: Bowel sounds are normal.      Palpations: Abdomen is soft. There is no hepatomegaly.      Tenderness: There is no abdominal tenderness. There is no right CVA tenderness, left CVA tenderness, guarding or rebound. Negative signs include Berg's sign, Rovsing's sign, McBurney's sign, psoas sign and obturator sign.      Hernia: There is no hernia in the left inguinal area or right inguinal area.   Genitourinary:     General: Normal  vulva.      Exam position: Supine.      Labia:         Right: No rash, tenderness, lesion or injury.         Left: No rash, tenderness, lesion or injury.       Urethra: No prolapse, urethral pain, urethral swelling or urethral lesion.      Vagina: Normal.      Cervix: Normal.      Uterus: Normal.       Adnexa: Right adnexa normal and left adnexa normal.        Right: No mass, tenderness or fullness.          Left: No mass, tenderness or fullness.        Rectum: Normal. Guaiac result negative. No mass, tenderness, anal fissure, external hemorrhoid or internal hemorrhoid. Normal anal tone.      Comments: Exam chaperoned by Kalani Schreiber MA  Musculoskeletal:      Cervical back: Neck supple. No edema.      Right lower leg: No edema.      Left lower leg: No edema.   Lymphadenopathy:      Cervical: No cervical adenopathy.      Right cervical: No superficial, deep or posterior cervical adenopathy.     Left cervical: No superficial, deep or posterior cervical adenopathy.      Upper Body:      Right upper body: No axillary adenopathy.      Left upper body: No axillary adenopathy.      Lower Body: No right inguinal adenopathy. No left inguinal adenopathy.   Skin:     General: Skin is warm and dry.      Capillary Refill: Capillary refill takes less than 2 seconds.   Neurological:      Mental Status: She is alert and oriented to person, place, and time.      Deep Tendon Reflexes: Reflexes are normal and symmetric.      Reflex Scores:       Patellar reflexes are 2+ on the right side and 2+ on the left side.  Psychiatric:         Attention and Perception: Attention and perception normal.         Mood and Affect: Mood and affect normal.         Speech: Speech normal.         Behavior: Behavior normal. Behavior is cooperative.         Thought Content: Thought content normal.         Cognition and Memory: Cognition and memory normal.         Judgment: Judgment normal.     I wore a facial mask, face shield, and gloves during this  patient encounter.  Patient also wearing a surgical mask.  Hand hygiene performed before and after seeing the patient.     Result Review :        POC Occult Blood Stool  Order: [id number]   Status: Final result     Visible to patient: Yes (seen)     Next appt: 07/25/2022 at 01:30 PM in Family Medicine (Miri Deshpande PA-C)     Dx: Encounter for well woman exam    Specimen Information: Stool         1 Result Note    Component   Ref Range & Units 1 d ago 5 yr ago   Fecal Occult Blood   Negative Negative     Lot Number  [id number]  [id number]    Expiration Date  9/30/23  2,018/4    DEVELOPER LOT NUMBER  [id number]     DEVELOPER EXPIRATION DATE  9/30/23     Positive Control   Positive Positive     Negative Control   Negative Negative     Resulting Agency [facility]                          Assessment and Plan    Diagnoses and all orders for this visit:    1. Annual physical exam (Primary)  -     Comprehensive Metabolic Panel  -     CBC & Differential  -     Lipid Panel With LDL / HDL Ratio    2. Acquired hypothyroidism  -     Thyroid Panel With TSH    3. Post-COVID syndrome    4. Encounter for well woman exam  -     IGP,rfx Aptima HPV All Pth  -     POC Occult Blood Stool    1. Annual  Physical examination with well woman exam: In office Hemoccult was negative.  Pap smear was sent to the laboratory for further evaluation.  Ni will have a CBC, CMP and lipid profile blood work collected at office visit today.  She will be notified of test results when completed.  I suspect her recent rhinorrhea is allergy/viral in nature.  Will use over-the-counter allergy medication for now.  Will return to office if symptoms do not improve.  2.  Chronic and stable acquired hypothyroidism: I will check thyroid profile with TSH today.  She will be notified of test results and any medication changes.  3.  Chronic and improving post-COVID syndrome: Her post-COVID symptoms are  improving.  She will keep going to the [facility] and her post COVID speech therapy.  Return to office in 6 weeks for reevaluation.    Patient Counseling:  --Nutrition: Stressed importance of moderation in sodium/caffeine intake, saturated fat and cholesterol.  Discussed caloric balance, sufficient intake of fresh fruits, vegetables, fiber,   calcium, iron.  --Discussed the new recommendation against daily use of baby aspirin for primary prevention in low risk patients.  --Exercise: Stressed the importance of regular exercise by incorporating into daily routine.    --Substance Abuse: Discussed cessation/primary prevention of tobacco, alcohol, or other drug use; driving or other dangerous activities under the influence.    --Dental health: Discussed importance of regular tooth brushing, flossing, and dental visits.  -- Suggested having eyes and vision checked if needed or past due.  --Immunizations reviewed.            Follow Up   Return in about 7 weeks (around 7/25/2022), or post covid F/U.  Patient was given instructions and counseling regarding her condition or for health maintenance advice. Please see specific information pulled into the AVS if appropriate.     BELINDA Munoz [facility]  [address] [place] [id number]  Dept: [phone]  Dept Fax: [phone]  Loc: [phone]  Loc Fax: [phone]

## 2022-06-04 LAB
ALBUMIN SERPL-MCNC: 3.9 G/DL (ref 3.8–4.8)
ALBUMIN/GLOB SERPL: 1.3 {RATIO} (ref 1.2–2.2)
ALP SERPL-CCNC: 70 IU/L (ref 44–121)
ALT SERPL-CCNC: 14 IU/L (ref 0–32)
AST SERPL-CCNC: 15 IU/L (ref 0–40)
BASOPHILS # BLD AUTO: 0.1 X10E3/UL (ref 0–0.2)
BASOPHILS NFR BLD AUTO: 1 %
BILIRUB SERPL-MCNC: 0.3 MG/DL (ref 0–1.2)
BUN SERPL-MCNC: 10 MG/DL (ref 6–24)
BUN/CREAT SERPL: 12 (ref 9–23)
CALCIUM SERPL-MCNC: 9.6 MG/DL (ref 8.7–10.2)
CHLORIDE SERPL-SCNC: 104 MMOL/L (ref 96–106)
CHOLEST SERPL-MCNC: 174 MG/DL (ref 100–199)
CO2 SERPL-SCNC: 21 MMOL/L (ref 20–29)
CREAT SERPL-MCNC: 0.82 MG/DL (ref 0.57–1)
EGFRCR SERPLBLD CKD-EPI 2021: 92 ML/MIN/1.73
EOSINOPHIL # BLD AUTO: 0.6 X10E3/UL (ref 0–0.4)
EOSINOPHIL NFR BLD AUTO: 4 %
ERYTHROCYTE [DISTWIDTH] IN BLOOD BY AUTOMATED COUNT: 12.4 % (ref 11.7–15.4)
FT4I SERPL CALC-MCNC: 2 (ref 1.2–4.9)
GLOBULIN SER CALC-MCNC: 3.1 G/DL (ref 1.5–4.5)
GLUCOSE SERPL-MCNC: 81 MG/DL (ref 65–99)
HCT VFR BLD AUTO: 43.8 % (ref 34–46.6)
HDLC SERPL-MCNC: 62 MG/DL
HGB BLD-MCNC: 14.3 G/DL (ref 11.1–15.9)
IMM GRANULOCYTES # BLD AUTO: 0.1 X10E3/UL (ref 0–0.1)
IMM GRANULOCYTES NFR BLD AUTO: 1 %
LDLC SERPL CALC-MCNC: 83 MG/DL (ref 0–99)
LDLC/HDLC SERPL: 1.3 RATIO (ref 0–3.2)
LYMPHOCYTES # BLD AUTO: 2.8 X10E3/UL (ref 0.7–3.1)
LYMPHOCYTES NFR BLD AUTO: 17 %
MCH RBC QN AUTO: 28.6 PG (ref 26.6–33)
MCHC RBC AUTO-ENTMCNC: 32.6 G/DL (ref 31.5–35.7)
MCV RBC AUTO: 88 FL (ref 79–97)
MONOCYTES # BLD AUTO: 1.2 X10E3/UL (ref 0.1–0.9)
MONOCYTES NFR BLD AUTO: 7 %
NEUTROPHILS # BLD AUTO: 11.7 X10E3/UL (ref 1.4–7)
NEUTROPHILS NFR BLD AUTO: 70 %
PLATELET # BLD AUTO: 277 X10E3/UL (ref 150–450)
POTASSIUM SERPL-SCNC: 4.4 MMOL/L (ref 3.5–5.2)
PROT SERPL-MCNC: 7 G/DL (ref 6–8.5)
RBC # BLD AUTO: 5 X10E6/UL (ref 3.77–5.28)
SODIUM SERPL-SCNC: 139 MMOL/L (ref 134–144)
T3RU NFR SERPL: 21 % (ref 24–39)
T4 SERPL-MCNC: 9.7 UG/DL (ref 4.5–12)
TRIGL SERPL-MCNC: 174 MG/DL (ref 0–149)
TSH SERPL DL<=0.005 MIU/L-ACNC: 1.65 UIU/ML (ref 0.45–4.5)
VLDLC SERPL CALC-MCNC: 29 MG/DL (ref 5–40)
WBC # BLD AUTO: 16.4 X10E3/UL (ref 3.4–10.8)

## 2022-06-06 DIAGNOSIS — J06.9 URI, ACUTE: Primary | ICD-10-CM

## 2022-06-06 RX ORDER — AMOXICILLIN 875 MG/1
875 TABLET, COATED ORAL 2 TIMES DAILY
Qty: 20 TABLET | Refills: 0 | Status: SHIPPED | OUTPATIENT
Start: 2022-06-06 | End: 2022-06-16

## 2022-06-07 LAB
CONV .: NORMAL
CYTOLOGIST CVX/VAG CYTO: NORMAL
CYTOLOGY CVX/VAG DOC CYTO: NORMAL
CYTOLOGY CVX/VAG DOC THIN PREP: NORMAL
DX ICD CODE: NORMAL
HIV 1 & 2 AB SER-IMP: NORMAL
OTHER STN SPEC: NORMAL
STAT OF ADQ CVX/VAG CYTO-IMP: NORMAL

## 2022-06-10 DIAGNOSIS — R05.9 COUGH: ICD-10-CM

## 2022-06-10 DIAGNOSIS — U09.9 POST-COVID SYNDROME: Primary | ICD-10-CM

## 2022-06-10 RX ORDER — BUDESONIDE AND FORMOTEROL FUMARATE DIHYDRATE 80; 4.5 UG/1; UG/1
2 AEROSOL RESPIRATORY (INHALATION)
Qty: 10.2 G | Refills: 1 | Status: SHIPPED | OUTPATIENT
Start: 2022-06-10 | End: 2022-08-02

## 2022-07-25 ENCOUNTER — OFFICE VISIT (OUTPATIENT)
Dept: FAMILY MEDICINE CLINIC | Facility: CLINIC | Age: 42
End: 2022-07-25

## 2022-07-25 VITALS
TEMPERATURE: 98.2 F | BODY MASS INDEX: 31.22 KG/M2 | OXYGEN SATURATION: 100 % | RESPIRATION RATE: 15 BRPM | HEIGHT: 68 IN | HEART RATE: 93 BPM | DIASTOLIC BLOOD PRESSURE: 90 MMHG | SYSTOLIC BLOOD PRESSURE: 130 MMHG | WEIGHT: 206 LBS

## 2022-07-25 DIAGNOSIS — U09.9 POST-COVID SYNDROME: Primary | ICD-10-CM

## 2022-07-25 PROCEDURE — 99213 OFFICE O/P EST LOW 20 MIN: CPT | Performed by: PHYSICIAN ASSISTANT

## 2022-07-25 RX ORDER — DROSPIRENONE AND ETHINYL ESTRADIOL 0.03MG-3MG
1 KIT ORAL DAILY
Qty: 84 TABLET | Refills: 3 | Status: SHIPPED | OUTPATIENT
Start: 2022-07-25

## 2022-07-25 NOTE — PROGRESS NOTES
"Chief Complaint  post covid dyspnea    Subjective          Ni Rodríguez presents to [facility]  History of Present Illness  Ni is a 42 year old female who presents for follow up oh her post COVID symptoms.  States she has seen an improvement in her symptoms.  States she is able to do yoga which has helped.  Has difficulty doing cardio without.  States she gets very tired and short of breath.  Has been taking her Symbicort inhaler as needed.  Will be returning to school next week.  Her sleep overall has been improving.  States occasionally she will wake up around 2 AM and sometimes will be able to return to sleep.  Denied any chest pain, shortness of air, cough, wheezing, or swelling of ankles.  Has been doing speech therapy which is helped as well.  She is hoping to have a good school year.  Review of Systems   All other systems reviewed and are negative.    Objective   Vital Signs:   /90   Pulse 93   Temp 98.2 °F (36.8 °C)   Resp 15   Ht 172.7 cm (68\")   Wt 93.4 kg (206 lb)   SpO2 100%   BMI 31.32 kg/m²     Patient's last menstrual period was 07/14/2022.      Physical Exam  Vitals and nursing note reviewed.   Constitutional:       Appearance: Normal appearance. She is well-developed and well-groomed. She is obese.      Interventions: Face mask in place.   HENT:      Head: Normocephalic and atraumatic.   Neck:      Vascular: No carotid bruit.      Trachea: Trachea and phonation normal. No tracheal tenderness.   Cardiovascular:      Rate and Rhythm: Normal rate and regular rhythm.      Pulses: Normal pulses.      Heart sounds: Normal heart sounds, S1 normal and S2 normal. No murmur heard.  Pulmonary:      Effort: Pulmonary effort is normal.      Breath sounds: Normal breath sounds and air entry.   Abdominal:      General: Bowel sounds are normal.      Palpations: Abdomen is soft. There is no hepatomegaly.      Tenderness: There is no abdominal tenderness. There is no " right CVA tenderness, left CVA tenderness, guarding or rebound. Negative signs include Berg's sign, Rovsing's sign, McBurney's sign, psoas sign and obturator sign.   Musculoskeletal:      Cervical back: Neck supple.   Skin:     General: Skin is warm and dry.      Capillary Refill: Capillary refill takes less than 2 seconds.   Neurological:      Mental Status: She is alert and oriented to person, place, and time.   Psychiatric:         Attention and Perception: Attention and perception normal.         Mood and Affect: Mood and affect normal.         Speech: Speech normal.         Behavior: Behavior normal. Behavior is cooperative.         Thought Content: Thought content normal.         Cognition and Memory: Cognition and memory normal.         Judgment: Judgment normal.     I wore a facial mask, face shield, and gloves during this patient encounter.  Patient also wearing a surgical mask.  Hand hygiene performed before and after seeing the patient.     Result Review :                 Assessment and Plan    Diagnoses and all orders for this visit:    1. Post-COVID syndrome (Primary)    Ni was seen in office today for chronic and improving post-COVID syndrome.  She is doing well.  She will continue doing her yoga once daily.  Have asked her to start her over-the-counter vitamin D and multivitamins to help with some lingering fatigue symptoms.  She will eat healthy and get plenty of sleep.  Will return to office as needed.  She will continue her each therapy for now.    Follow Up   Return if symptoms worsen or fail to improve.  Patient was given instructions and counseling regarding her condition or for health maintenance advice. Please see specific information pulled into the AVS if appropriate.     BELINDA Munoz [facility]  [address] [place] [id number]  Dept: [phone]  Dept Fax: [phone]  Loc: [phone]  Loc Fax:  [phone]        Answers for HPI/ROS submitted by the patient on 7/24/2022  Please describe your symptoms.: Follow  up for long Covid  Have you had these symptoms before?: Yes  How long have you been having these symptoms?: Greater than 2 weeks  What is the primary reason for your visit?: Other

## 2022-08-02 DIAGNOSIS — R05.9 COUGH: ICD-10-CM

## 2022-08-02 DIAGNOSIS — U09.9 POST-COVID SYNDROME: ICD-10-CM

## 2022-08-02 RX ORDER — BUDESONIDE AND FORMOTEROL FUMARATE DIHYDRATE 80; 4.5 UG/1; UG/1
2 AEROSOL RESPIRATORY (INHALATION)
Qty: 10.2 EACH | Refills: 1 | Status: SHIPPED | OUTPATIENT
Start: 2022-08-02 | End: 2022-09-07 | Stop reason: SDUPTHER

## 2022-08-27 DIAGNOSIS — E03.9 ACQUIRED HYPOTHYROIDISM: ICD-10-CM

## 2022-08-29 RX ORDER — LEVOTHYROXINE SODIUM 50 MCG
TABLET ORAL
Qty: 90 TABLET | Refills: 3 | Status: SHIPPED | OUTPATIENT
Start: 2022-08-29

## 2022-09-07 DIAGNOSIS — R05.9 COUGH: ICD-10-CM

## 2022-09-07 DIAGNOSIS — U09.9 POST-COVID SYNDROME: ICD-10-CM

## 2022-09-07 RX ORDER — BUDESONIDE AND FORMOTEROL FUMARATE DIHYDRATE 80; 4.5 UG/1; UG/1
2 AEROSOL RESPIRATORY (INHALATION)
Qty: 10.2 EACH | Refills: 1 | Status: SHIPPED | OUTPATIENT
Start: 2022-09-07 | End: 2022-09-15 | Stop reason: SDUPTHER

## 2022-09-15 DIAGNOSIS — R05.9 COUGH: ICD-10-CM

## 2022-09-15 DIAGNOSIS — U09.9 POST-COVID SYNDROME: ICD-10-CM

## 2022-09-15 RX ORDER — BUDESONIDE AND FORMOTEROL FUMARATE DIHYDRATE 80; 4.5 UG/1; UG/1
2 AEROSOL RESPIRATORY (INHALATION)
Qty: 3 EACH | Refills: 1 | Status: SHIPPED | OUTPATIENT
Start: 2022-09-15 | End: 2022-11-10 | Stop reason: CLARIF

## 2022-10-05 DIAGNOSIS — R05.3 CHRONIC COUGH: Primary | ICD-10-CM

## 2022-10-05 DIAGNOSIS — U09.9 POST-COVID SYNDROME: ICD-10-CM

## 2022-10-07 DIAGNOSIS — R53.82 CHRONIC FATIGUE: ICD-10-CM

## 2022-10-07 DIAGNOSIS — E53.8 VITAMIN B 12 DEFICIENCY: Primary | ICD-10-CM

## 2022-10-07 DIAGNOSIS — E03.9 ACQUIRED HYPOTHYROIDISM: ICD-10-CM

## 2022-10-11 LAB
ALBUMIN SERPL-MCNC: 4 G/DL (ref 3.5–5.2)
ALBUMIN/GLOB SERPL: 1.3 G/DL
ALP SERPL-CCNC: 77 U/L (ref 39–117)
ALT SERPL-CCNC: 18 U/L (ref 1–33)
AST SERPL-CCNC: 16 U/L (ref 1–32)
BASOPHILS # BLD AUTO: 0.08 10*3/MM3 (ref 0–0.2)
BASOPHILS NFR BLD AUTO: 0.8 % (ref 0–1.5)
BILIRUB SERPL-MCNC: 0.3 MG/DL (ref 0–1.2)
BUN SERPL-MCNC: 10 MG/DL (ref 6–20)
BUN/CREAT SERPL: 12.5 (ref 7–25)
CALCIUM SERPL-MCNC: 9.6 MG/DL (ref 8.6–10.5)
CHLORIDE SERPL-SCNC: 100 MMOL/L (ref 98–107)
CO2 SERPL-SCNC: 27.2 MMOL/L (ref 22–29)
CREAT SERPL-MCNC: 0.8 MG/DL (ref 0.57–1)
EGFRCR SERPLBLD CKD-EPI 2021: 93.9 ML/MIN/1.73
EOSINOPHIL # BLD AUTO: 0.42 10*3/MM3 (ref 0–0.4)
EOSINOPHIL NFR BLD AUTO: 4.4 % (ref 0.3–6.2)
ERYTHROCYTE [DISTWIDTH] IN BLOOD BY AUTOMATED COUNT: 12.7 % (ref 12.3–15.4)
FT4I SERPL CALC-MCNC: 2.3 (ref 1.2–4.9)
GLOBULIN SER CALC-MCNC: 3 GM/DL
GLUCOSE SERPL-MCNC: 105 MG/DL (ref 65–99)
HCT VFR BLD AUTO: 46.3 % (ref 34–46.6)
HGB BLD-MCNC: 14.9 G/DL (ref 12–15.9)
IMM GRANULOCYTES # BLD AUTO: 0.09 10*3/MM3 (ref 0–0.05)
IMM GRANULOCYTES NFR BLD AUTO: 1 % (ref 0–0.5)
LYMPHOCYTES # BLD AUTO: 3.56 10*3/MM3 (ref 0.7–3.1)
LYMPHOCYTES NFR BLD AUTO: 37.6 % (ref 19.6–45.3)
MCH RBC QN AUTO: 27.9 PG (ref 26.6–33)
MCHC RBC AUTO-ENTMCNC: 32.2 G/DL (ref 31.5–35.7)
MCV RBC AUTO: 86.5 FL (ref 79–97)
MONOCYTES # BLD AUTO: 0.97 10*3/MM3 (ref 0.1–0.9)
MONOCYTES NFR BLD AUTO: 10.3 % (ref 5–12)
NEUTROPHILS # BLD AUTO: 4.34 10*3/MM3 (ref 1.7–7)
NEUTROPHILS NFR BLD AUTO: 45.9 % (ref 42.7–76)
NRBC BLD AUTO-RTO: 0 /100 WBC (ref 0–0.2)
PLATELET # BLD AUTO: 326 10*3/MM3 (ref 140–450)
POTASSIUM SERPL-SCNC: 4.5 MMOL/L (ref 3.5–5.2)
PROT SERPL-MCNC: 7 G/DL (ref 6–8.5)
RBC # BLD AUTO: 5.35 10*6/MM3 (ref 3.77–5.28)
SODIUM SERPL-SCNC: 140 MMOL/L (ref 136–145)
T3RU NFR SERPL: 23 % (ref 24–39)
T4 SERPL-MCNC: 10.2 UG/DL (ref 4.5–12)
TSH SERPL DL<=0.005 MIU/L-ACNC: 1.51 UIU/ML (ref 0.45–4.5)
VIT B12 SERPL-MCNC: 433 PG/ML (ref 211–946)
WBC # BLD AUTO: 9.46 10*3/MM3 (ref 3.4–10.8)

## 2022-10-19 ENCOUNTER — OFFICE VISIT (OUTPATIENT)
Dept: FAMILY MEDICINE CLINIC | Facility: CLINIC | Age: 42
End: 2022-10-19

## 2022-10-19 ENCOUNTER — HOSPITAL ENCOUNTER (OUTPATIENT)
Dept: GENERAL RADIOLOGY | Facility: HOSPITAL | Age: 42
Discharge: HOME | End: 2022-10-19
Admitting: PHYSICIAN ASSISTANT

## 2022-10-19 VITALS
WEIGHT: 205.6 LBS | DIASTOLIC BLOOD PRESSURE: 84 MMHG | RESPIRATION RATE: 24 BRPM | HEART RATE: 91 BPM | SYSTOLIC BLOOD PRESSURE: 120 MMHG | HEIGHT: 65 IN | OXYGEN SATURATION: 99 % | BODY MASS INDEX: 34.26 KG/M2 | TEMPERATURE: 97.8 F

## 2022-10-19 DIAGNOSIS — R06.02 SHORTNESS OF BREATH: Primary | ICD-10-CM

## 2022-10-19 DIAGNOSIS — R06.82 TACHYPNEA: ICD-10-CM

## 2022-10-19 PROCEDURE — 71046 X-RAY EXAM CHEST 2 VIEWS: CPT

## 2022-10-19 PROCEDURE — 93000 ELECTROCARDIOGRAM COMPLETE: CPT | Performed by: PHYSICIAN ASSISTANT

## 2022-10-19 PROCEDURE — 99214 OFFICE O/P EST MOD 30 MIN: CPT | Performed by: PHYSICIAN ASSISTANT

## 2022-10-19 NOTE — PROGRESS NOTES
"Chief Complaint  Shortness of Breath (Elevated heart rate)    Subjective          Ni Rodríguez presents to [facility]  History of Present Illness  Ni is a 42 year old female who presents with new shortness of breath with elevated heart rate.  States this morning she woke up feeling short of breath.  A friend told her to take 8 puffs of her albuterol inhaler at 1 time.  States she did that.  Since this time she has had elevated heart rate especially with movement.  She has been using her Symbicort inhaler as directed.  Had extensive cardiac work-up earlier this year due to her shortness of breath status post COVID.  Everything was normal.  Unsure if the weather change had triggered a response again.  Denied any current fevers, chills, cough, wheezing, chest pain, nausea, vomiting, diarrhea or swelling of ankles.  States she has had some allergy type symptoms.  Has not seen her allergist recently.  Has upcoming appointment with pulmonology in early December.  She has had some dizziness off and on.    Review of Systems   Constitutional: Negative.    HENT: Negative.    Eyes: Negative.    Respiratory: Positive for shortness of breath. Negative for cough, chest tightness and wheezing.    Cardiovascular: Negative for chest pain, palpitations and leg swelling.        Elevated heart rate   Gastrointestinal: Negative.  Negative for diarrhea, nausea and vomiting.   Neurological: Positive for dizziness and light-headedness.   All other systems reviewed and are negative.    Objective   Vital Signs:   /84 (BP Location: Left arm, Patient Position: Sitting, Cuff Size: Adult)   Pulse 91   Temp 97.8 °F (36.6 °C) (Infrared)   Resp 24   Ht 165.1 cm (65\")   Wt 93.3 kg (205 lb 9.6 oz)   SpO2 99%   BMI 34.21 kg/m²     Physical Exam  Vitals and nursing note reviewed.   Constitutional:       Appearance: Normal appearance. She is well-developed and well-groomed. She is obese.      " Interventions: Face mask in place.   HENT:      Head: Normocephalic and atraumatic.      Jaw: There is normal jaw occlusion.      Right Ear: Hearing, tympanic membrane, ear canal and external ear normal.      Left Ear: Hearing, tympanic membrane, ear canal and external ear normal.      Nose: Nose normal.      Right Sinus: No maxillary sinus tenderness or frontal sinus tenderness.      Left Sinus: No maxillary sinus tenderness or frontal sinus tenderness.      Mouth/Throat:      Lips: Pink.      Mouth: Mucous membranes are moist.      Dentition: Normal dentition.      Tongue: No lesions.      Pharynx: Oropharynx is clear. Uvula midline.      Tonsils: No tonsillar exudate.   Eyes:      General: Lids are normal.      Conjunctiva/sclera: Conjunctivae normal.      Pupils: Pupils are equal, round, and reactive to light.   Neck:      Vascular: No carotid bruit.      Trachea: Trachea and phonation normal. No tracheal tenderness.   Cardiovascular:      Rate and Rhythm: Normal rate and regular rhythm.      Pulses: Normal pulses.      Heart sounds: Normal heart sounds, S1 normal and S2 normal. No murmur heard.  Pulmonary:      Effort: Pulmonary effort is normal. Tachypnea present. No accessory muscle usage, prolonged expiration, respiratory distress or retractions.      Breath sounds: Normal breath sounds and air entry.   Abdominal:      General: Bowel sounds are normal.      Palpations: Abdomen is soft.      Tenderness: There is no abdominal tenderness. There is no right CVA tenderness, left CVA tenderness, guarding or rebound. Negative signs include Berg's sign, Rovsing's sign, McBurney's sign, psoas sign and obturator sign.   Musculoskeletal:      Cervical back: Neck supple.      Right lower leg: No edema.      Left lower leg: No edema.   Lymphadenopathy:      Cervical: No cervical adenopathy.      Right cervical: No superficial, deep or posterior cervical adenopathy.     Left cervical: No superficial, deep or posterior  cervical adenopathy.   Skin:     General: Skin is warm and dry.      Capillary Refill: Capillary refill takes less than 2 seconds.   Neurological:      Mental Status: She is alert and oriented to person, place, and time.   Psychiatric:         Attention and Perception: Attention and perception normal.         Mood and Affect: Mood and affect normal.         Speech: Speech normal.         Behavior: Behavior is cooperative.         Thought Content: Thought content normal.         Cognition and Memory: Cognition and memory normal.         Judgment: Judgment normal.     I wore a facial mask during this patient encounter.  Patient also wearing a surgical mask.  Hand hygiene performed before and after seeing the patient.     Result Review :            SCANNED EKG (02/23/2022)   XR Chest PA & Lateral (02/14/2022 16:00)   Adult Transthoracic Echo Complete W/ Cont if Necessary Per Protocol (02/18/2022 15:50)       ECG 12 Lead    Date/Time: 10/19/2022 4:41 PM  Performed by: Miri Deshpande PA-C  Authorized by: Miri Deshpande PA-C   Comparison: compared with previous ECG from 2/14/2022  Similar to previous ECG  Rhythm: sinus rhythm  Rate: normal  BPM: 96  Conduction: conduction normal  ST Segments: ST segments normal  T Waves: T waves normal  QRS axis: normal  Other findings comments: Possible anterior myocardial infarction probable old.  Similar to EKG on February 14, 2022.  Comments: Indication:  New shortness of air with elevated heart rate  TX int: 136 ms  QRS dur:  80 ms  QT/QTc:  371/425 ms  .                Assessment and Plan    Diagnoses and all orders for this visit:    1. Shortness of breath (Primary)  -     XR Chest PA & Lateral  -     ECG 12 Lead    2. Tachypnea  -     XR Chest PA & Lateral  -     ECG 12 Lead    Ni was seen in office today with new shortness of breath with tachypnea.  In office EKG was similar to EKG performed on February 14, 2022.  I have reviewed her echocardiogram, previous EKG and  chest x-ray from February 2022.  I suspect this may be a flare up of her asthma versus taking too much albuterol.  We will check chest x-ray today.  If symptoms worsen, she will proceed to the nearest emergency room for further evaluation and treatment.     Follow Up   Return if symptoms worsen or fail to improve.  Patient was given instructions and counseling regarding her condition or for health maintenance advice. Please see specific information pulled into the AVS if appropriate.     [facility]  [address] [place] [id number]  Dept: [phone]  Dept Fax: [phone]  Loc: [phone]  Loc Fax: [phone]

## 2022-10-20 DIAGNOSIS — R06.02 SHORTNESS OF BREATH: Primary | ICD-10-CM

## 2022-10-20 RX ORDER — PREDNISONE 10 MG/1
10 TABLET ORAL 2 TIMES DAILY
Qty: 10 TABLET | Refills: 0 | Status: SHIPPED | OUTPATIENT
Start: 2022-10-20 | End: 2022-11-10

## 2022-11-10 ENCOUNTER — OFFICE VISIT (OUTPATIENT)
Dept: FAMILY MEDICINE CLINIC | Facility: CLINIC | Age: 42
End: 2022-11-10

## 2022-11-10 VITALS
RESPIRATION RATE: 16 BRPM | OXYGEN SATURATION: 100 % | TEMPERATURE: 98.2 F | SYSTOLIC BLOOD PRESSURE: 126 MMHG | HEIGHT: 65 IN | HEART RATE: 78 BPM | BODY MASS INDEX: 35.16 KG/M2 | WEIGHT: 211 LBS | DIASTOLIC BLOOD PRESSURE: 78 MMHG

## 2022-11-10 DIAGNOSIS — R00.2 HEART PALPITATIONS: Primary | ICD-10-CM

## 2022-11-10 PROCEDURE — 99214 OFFICE O/P EST MOD 30 MIN: CPT | Performed by: PHYSICIAN ASSISTANT

## 2022-11-10 NOTE — PROGRESS NOTES
Chief Complaint  Rapid Heart Rate    Subjective     {Problem List  Visit Diagnosis   Encounters  Notes  Medications  Labs  Result Review Imaging  Media :23}     Ni Rodríguez presents to [facility]  History of Present Illness  Ni is a 42 year old female who presents continue heart palpitations with tachycardia.  She has been monitoring her heart rate on her Fit bit and pulse oximetry.  Heart rate has been averaging high 90's to 125 at times.  Currently drinks 2 cups of coffee in the morning.  She has been drinking about 96 ounces of water daily.  Oxygen levels are improving.  Saw pulmonologist recently who placed her on a new inhaler.  She was instructed to use her albuterol inhaler hourly if needed.  Had COVID earlier this year.  Has had an extensive cardiac work-up with cardiology appointment.  Has seen allergist and now pulmonologist.  Ni also has gone through the post-COVID clinic.  States that her fatigue has improved.  Now she has the heart palpitations.  She may get chest pain across the center of her chest off and on.  She does get short of breath with the coughing and wheezing.  Her sleep is improving.  Diet has also improved.  Denied any current fevers, chills, upper respiratory symptoms, GI upset or swelling of ankles.  States she is afraid to start exercising due to her heart rate.  States heart rate will be normal at times but then will increase suddenly.  Bowel movements have been normal.  Review of Systems   Constitutional: Negative.    HENT: Negative.    Eyes: Negative.    Respiratory: Positive for cough, chest tightness, shortness of breath and wheezing.    Cardiovascular: Positive for palpitations.   Gastrointestinal: Negative.    Endocrine: Negative.    Genitourinary: Negative.    Musculoskeletal: Negative.    Neurological: Negative.    Hematological: Negative.    Psychiatric/Behavioral: Negative.    All other systems reviewed and are  "negative.    Objective   Vital Signs:   /78 (BP Location: Left arm, Patient Position: Sitting, Cuff Size: Adult)   Pulse 78   Temp 98.2 °F (36.8 °C)   Resp 16   Ht 165.1 cm (65\")   Wt 95.7 kg (211 lb)   SpO2 100%   BMI 35.11 kg/m²     Physical Exam  Vitals and nursing note reviewed.   Constitutional:       Appearance: Normal appearance. She is well-developed and well-groomed. She is obese.      Interventions: Face mask in place.   HENT:      Head: Normocephalic and atraumatic.   Neck:      Thyroid: No thyroid mass, thyromegaly or thyroid tenderness.      Vascular: No carotid bruit.      Trachea: Trachea and phonation normal. No tracheal tenderness.   Cardiovascular:      Rate and Rhythm: Regular rhythm. Tachycardia present.      Pulses: Normal pulses.      Heart sounds: Normal heart sounds, S1 normal and S2 normal. No murmur heard.  Pulmonary:      Effort: Pulmonary effort is normal.      Breath sounds: Normal breath sounds and air entry.   Abdominal:      General: Bowel sounds are normal.      Palpations: Abdomen is soft. There is no hepatomegaly.      Tenderness: There is no abdominal tenderness. There is no right CVA tenderness, left CVA tenderness, guarding or rebound. Negative signs include Berg's sign, Rovsing's sign, McBurney's sign, psoas sign and obturator sign.   Musculoskeletal:      Cervical back: Neck supple.      Right lower leg: No edema.      Left lower leg: No edema.   Skin:     General: Skin is warm and dry.      Capillary Refill: Capillary refill takes less than 2 seconds.   Neurological:      Mental Status: She is alert and oriented to person, place, and time.   Psychiatric:         Attention and Perception: Attention and perception normal.         Mood and Affect: Mood and affect normal.         Speech: Speech normal.         Behavior: Behavior normal. Behavior is cooperative.         Thought Content: Thought content normal.         Cognition and Memory: Cognition and memory " normal.         Judgment: Judgment normal.     I wore a facial mask during this patient encounter.  Patient also wearing a surgical mask.  Hand hygiene performed before and after seeing the patient.     Result Review :     CMP    CMP 6/3/22 10/10/22   Glucose 81 105 (A)   BUN 10 10   Creatinine 0.82 0.80   Sodium 139 140   Potassium 4.4 4.5   Chloride 104 100   Calcium 9.6 9.6   Total Protein 7.0 7.0   Albumin 3.9 4.00   Globulin 3.1 3.0   Total Bilirubin 0.3 0.3   Alkaline Phosphatase 70 77   AST (SGOT) 15 16   ALT (SGPT) 14 18   (A) Abnormal value            CBC    CBC 6/3/22 10/10/22   WBC 16.4 (A) 9.46   RBC 5.00 5.35 (A)   Hemoglobin 14.3 14.9   Hematocrit 43.8 46.3   MCV 88 86.5   MCH 28.6 27.9   MCHC 32.6 32.2   RDW 12.4 12.7   Platelets 277 326   (A) Abnormal value            TSH    TSH 6/3/22 10/10/22   TSH 1.650 1.510         XR Chest PA & Lateral (10/19/2022 17:06)   SCANNED EKG (02/23/2022)               Assessment and Plan    Diagnoses and all orders for this visit:    1. Heart palpitations (Primary)  -     Holter monitor - 48 hour; Future  -     Ambulatory Referral to Cardiology    Ni was seen in office today with continuing heart palpitations.  Heart rate in office today ranged from .  I have reviewed above lab work,chest x-ray and EKG's with her today.  I will schedule a 48-hour Holter monitor for further evaluation of heart palpitations.  Recently saw cardiologist and placed on Breztri.  I do not feel comfortable starting a beta-blocker at this time due to the inhalers that she is currently taking.  Plan to see  cardiology for further evaluation of her tachycardia.  She will continue her current thyroid medication at home as well.  She was instructed to stop her caffeine intake.  She will add a G 2 Gatorade at least once daily to see if this will help.  Ni will continue to drink water as directed.    I spent 30 minutes caring for Ni on this date of service. This time includes time  spent by me in the following activities:preparing for the visit, reviewing tests, performing a medically appropriate examination and/or evaluation , counseling and educating the patient/family/caregiver, ordering medications, tests, or procedures, referring and communicating with other health care professionals  and documenting information in the medical record  Follow Up   Return in about 3 weeks (around 12/1/2022), or heart rate.  Patient was given instructions and counseling regarding her condition or for health maintenance advice. Please see specific information pulled into the AVS if appropriate.     [facility]  [address] [place] [id number]  Dept: [phone]  Dept Fax: [phone]  Loc: [phone]  Loc Fax: [phone]

## 2022-11-14 ENCOUNTER — APPOINTMENT (OUTPATIENT)
Dept: GENERAL RADIOLOGY | Facility: HOSPITAL | Age: 42
End: 2022-11-14

## 2022-11-14 ENCOUNTER — HOSPITAL ENCOUNTER (EMERGENCY)
Facility: HOSPITAL | Age: 42
Discharge: HOME | End: 2022-11-14
Attending: EMERGENCY MEDICINE | Admitting: EMERGENCY MEDICINE

## 2022-11-14 ENCOUNTER — APPOINTMENT (OUTPATIENT)
Dept: CT IMAGING | Facility: HOSPITAL | Age: 42
End: 2022-11-14

## 2022-11-14 VITALS
RESPIRATION RATE: 26 BRPM | TEMPERATURE: 98 F | HEART RATE: 87 BPM | OXYGEN SATURATION: 100 % | DIASTOLIC BLOOD PRESSURE: 87 MMHG | SYSTOLIC BLOOD PRESSURE: 138 MMHG

## 2022-11-14 DIAGNOSIS — R06.00 DYSPNEA, UNSPECIFIED TYPE: Primary | ICD-10-CM

## 2022-11-14 LAB
ALBUMIN SERPL-MCNC: 4 G/DL (ref 3.5–5.2)
ALBUMIN/GLOB SERPL: 1.5 G/DL
ALP SERPL-CCNC: 51 U/L (ref 39–117)
ALT SERPL W P-5'-P-CCNC: 15 U/L (ref 1–33)
ANION GAP SERPL CALCULATED.3IONS-SCNC: 9 MMOL/L (ref 5–15)
AST SERPL-CCNC: 13 U/L (ref 1–32)
BASOPHILS # BLD AUTO: 0.06 10*3/MM3 (ref 0–0.2)
BASOPHILS NFR BLD AUTO: 0.5 % (ref 0–1.5)
BILIRUB SERPL-MCNC: 0.3 MG/DL (ref 0–1.2)
BUN SERPL-MCNC: 11 MG/DL (ref 6–20)
BUN/CREAT SERPL: 13.4 (ref 7–25)
CALCIUM SPEC-SCNC: 9.3 MG/DL (ref 8.6–10.5)
CHLORIDE SERPL-SCNC: 107 MMOL/L (ref 98–107)
CO2 SERPL-SCNC: 23 MMOL/L (ref 22–29)
CREAT SERPL-MCNC: 0.82 MG/DL (ref 0.57–1)
D DIMER PPP FEU-MCNC: 0.4 MCGFEU/ML (ref 0–0.49)
DEPRECATED RDW RBC AUTO: 40.4 FL (ref 37–54)
EGFRCR SERPLBLD CKD-EPI 2021: 91.2 ML/MIN/1.73
EOSINOPHIL # BLD AUTO: 0.29 10*3/MM3 (ref 0–0.4)
EOSINOPHIL NFR BLD AUTO: 2.2 % (ref 0.3–6.2)
ERYTHROCYTE [DISTWIDTH] IN BLOOD BY AUTOMATED COUNT: 13 % (ref 12.3–15.4)
GLOBULIN UR ELPH-MCNC: 2.6 GM/DL
GLUCOSE SERPL-MCNC: 87 MG/DL (ref 65–99)
HCG SERPL QL: NEGATIVE
HCT VFR BLD AUTO: 41.6 % (ref 34–46.6)
HGB BLD-MCNC: 13.9 G/DL (ref 12–15.9)
HOLD SPECIMEN: NORMAL
IMM GRANULOCYTES # BLD AUTO: 0.12 10*3/MM3 (ref 0–0.05)
IMM GRANULOCYTES NFR BLD AUTO: 0.9 % (ref 0–0.5)
LYMPHOCYTES # BLD AUTO: 4.31 10*3/MM3 (ref 0.7–3.1)
LYMPHOCYTES NFR BLD AUTO: 33.3 % (ref 19.6–45.3)
MCH RBC QN AUTO: 28.9 PG (ref 26.6–33)
MCHC RBC AUTO-ENTMCNC: 33.4 G/DL (ref 31.5–35.7)
MCV RBC AUTO: 86.5 FL (ref 79–97)
MONOCYTES # BLD AUTO: 1.15 10*3/MM3 (ref 0.1–0.9)
MONOCYTES NFR BLD AUTO: 8.9 % (ref 5–12)
NEUTROPHILS NFR BLD AUTO: 54.2 % (ref 42.7–76)
NEUTROPHILS NFR BLD AUTO: 7.01 10*3/MM3 (ref 1.7–7)
NRBC BLD AUTO-RTO: 0 /100 WBC (ref 0–0.2)
NT-PROBNP SERPL-MCNC: 28.1 PG/ML (ref 0–450)
PLATELET # BLD AUTO: 228 10*3/MM3 (ref 140–450)
PMV BLD AUTO: 9.5 FL (ref 6–12)
POTASSIUM SERPL-SCNC: 3.6 MMOL/L (ref 3.5–5.2)
PROT SERPL-MCNC: 6.6 G/DL (ref 6–8.5)
RBC # BLD AUTO: 4.81 10*6/MM3 (ref 3.77–5.28)
SODIUM SERPL-SCNC: 139 MMOL/L (ref 136–145)
TROPONIN T SERPL-MCNC: <0.01 NG/ML (ref 0–0.03)
WBC NRBC COR # BLD: 12.94 10*3/MM3 (ref 3.4–10.8)
WHOLE BLOOD HOLD COAG: NORMAL
WHOLE BLOOD HOLD SPECIMEN: NORMAL

## 2022-11-14 PROCEDURE — 99284 EMERGENCY DEPT VISIT MOD MDM: CPT

## 2022-11-14 PROCEDURE — 0 IOPAMIDOL PER 1 ML: Performed by: EMERGENCY MEDICINE

## 2022-11-14 PROCEDURE — 93005 ELECTROCARDIOGRAM TRACING: CPT

## 2022-11-14 PROCEDURE — 83880 ASSAY OF NATRIURETIC PEPTIDE: CPT | Performed by: EMERGENCY MEDICINE

## 2022-11-14 PROCEDURE — 93005 ELECTROCARDIOGRAM TRACING: CPT | Performed by: EMERGENCY MEDICINE

## 2022-11-14 PROCEDURE — 71275 CT ANGIOGRAPHY CHEST: CPT

## 2022-11-14 PROCEDURE — 71046 X-RAY EXAM CHEST 2 VIEWS: CPT

## 2022-11-14 PROCEDURE — 85025 COMPLETE CBC W/AUTO DIFF WBC: CPT | Performed by: EMERGENCY MEDICINE

## 2022-11-14 PROCEDURE — 84703 CHORIONIC GONADOTROPIN ASSAY: CPT | Performed by: EMERGENCY MEDICINE

## 2022-11-14 PROCEDURE — 80053 COMPREHEN METABOLIC PANEL: CPT | Performed by: EMERGENCY MEDICINE

## 2022-11-14 PROCEDURE — 84484 ASSAY OF TROPONIN QUANT: CPT | Performed by: EMERGENCY MEDICINE

## 2022-11-14 PROCEDURE — 85379 FIBRIN DEGRADATION QUANT: CPT | Performed by: EMERGENCY MEDICINE

## 2022-11-14 PROCEDURE — 93010 ELECTROCARDIOGRAM REPORT: CPT | Performed by: INTERNAL MEDICINE

## 2022-11-14 RX ORDER — ALPRAZOLAM 0.25 MG/1
0.25 TABLET ORAL ONCE
Status: COMPLETED | OUTPATIENT
Start: 2022-11-14 | End: 2022-11-14

## 2022-11-14 RX ORDER — SODIUM CHLORIDE 0.9 % (FLUSH) 0.9 %
10 SYRINGE (ML) INJECTION AS NEEDED
Status: DISCONTINUED | OUTPATIENT
Start: 2022-11-14 | End: 2022-11-15 | Stop reason: HOSPADM

## 2022-11-14 RX ADMIN — ALPRAZOLAM 0.25 MG: 0.25 TABLET ORAL at 19:17

## 2022-11-14 RX ADMIN — IOPAMIDOL 100 ML: 755 INJECTION, SOLUTION INTRAVENOUS at 21:15

## 2022-11-14 NOTE — ED PROVIDER NOTES
EMERGENCY DEPARTMENT ENCOUNTER    Room Number:  35/35  Date of encounter:  11/14/2022  PCP: Miri Deshpande PA-C  Historian: Patient      HPI:  Chief Complaint: Shortness of breath  A complete HPI/ROS/PMH/PSH/SH/FH are unobtainable due to: Nothing    Context: Ni Rodríguez is a 42 y.o. female who presents to the ED c/o shortness of breath which is been an ongoing issue now for the majority of this year since she recovered from COVID-19.  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  She says that only minimal exertion by walking around the house will get her so short of breath sometimes that she has to sit down and catch her breath.  There is no real chest pain, no fever cough, and she has not had any acute infectious symptoms.    Patient said that the symptoms have been intermittent, and been getting worse recently.  Patient has seen a pulmonologist and reports normal PFTs within the last month or so.  She has been seen by cardiology, had multiple tests which she is not able to tell me what they are, but also has plan for Holter monitor to be placed tomorrow with cardiology office.    She is otherwise pretty healthy with no history of coronary disease, no history of lung disease, has never smoked, and this is all happened since she went through COVID-19 in January of this year      PAST MEDICAL HISTORY  Active Ambulatory Problems     Diagnosis Date Noted   • Acquired hypothyroidism 02/01/2016   • Allergic conjunctivitis 02/01/2016   • Atopic rhinitis 02/01/2016   • Hemorrhoids 02/01/2016   • Chronic fatigue 08/14/2017   • Screening for lipid disorders 06/01/2018   • Patellofemoral pain syndrome of right knee 09/20/2018   • Need for influenza vaccination 01/02/2019   • Acute URI 03/29/2019   • Acute bronchitis 03/29/2019   • Head injury 02/28/2020   • Acute post-traumatic headache,  not intractable 02/28/2020   • Vision changes 02/28/2020   • Otitis media 04/29/2020   • Dysfunction of right eustachian tube 04/29/2020   • Intractable chronic migraine without aura 03/12/2021   • WORLEY (dyspnea on exertion) 02/14/2022   • Left-sided chest pain 02/14/2022   • 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      FAMILY HISTORY  Family History   Problem Relation Age of Onset   • Arthritis Mother         Osteoarthritis   • Thyroid disease Mother    • Hypertension Father    • Pancreatitis Father         GB complication - 1/2 removed   • Hyperlipidemia Father    • Diabetes Father    • Other Sister         Hypoglycemia   • Hypothyroidism Brother    • Uterine cancer Maternal Grandmother    • Alzheimer's disease Maternal Grandfather    • Prostate cancer Maternal Grandfather    • Arthritis Paternal Grandmother         Osteoarthritis   • Prostate cancer Paternal Grandfather          SOCIAL HISTORY  Social History     Socioeconomic History   • Marital status:    • Number of children: 2   • Years of education: masters in education   Tobacco Use   • Smoking status: Never   • Smokeless tobacco: Never   Vaping Use   • Vaping Use: Never used   Substance and Sexual Activity   • Alcohol use: Yes     Alcohol/week: 1.0 standard drink     Types: 1 Glasses of wine per week   • Drug use: No   • Sexual activity: Yes     Partners: Male     Birth control/protection: OCP         ALLERGIES  Bee venom and Wasp venom protein        REVIEW OF  SYSTEMS  Review of Systems     All systems reviewed and negative except for those discussed in HPI.       PHYSICAL EXAM    I have reviewed the triage vital signs and nursing notes.    ED Triage Vitals [11/14/22 1727]   Temp Heart Rate Resp BP SpO2   97.3 °F (36.3 °C) 100 20 -- 100 %      Temp src Heart Rate Source Patient Position BP Location FiO2 (%)   Tympanic Monitor -- -- --       Physical Exam  GENERAL: Awake and alert, anxious appearing  HENT: nares patent  EYES: no scleral icterus  CV: regular rhythm, regular rate  RESPIRATORY: normal effort, CTA bilaterally with no respiratory distress at rest.  She does appear to get some tachypnea when she exerts herself but her O2 sats remained normal.  Her heart rate does seem to elevate into the 120s range when she walks around  ABDOMEN: soft, nondistended nontender  MUSCULOSKELETAL: no deformity, no calf tenderness or pedal  NEURO: alert, moves all extremities, follows commands  SKIN: warm, dry        LAB RESULTS  Recent Results (from the past 24 hour(s))   ECG 12 Lead ED Triage Standing Order; SOA    Collection Time: 11/14/22  6:00 PM   Result Value Ref Range    QT Interval 361 ms   Comprehensive Metabolic Panel    Collection Time: 11/14/22  6:34 PM    Specimen: Blood   Result Value Ref Range    Glucose 87 65 - 99 mg/dL    BUN 11 6 - 20 mg/dL    Creatinine 0.82 0.57 - 1.00 mg/dL    Sodium 139 136 - 145 mmol/L    Potassium 3.6 3.5 - 5.2 mmol/L    Chloride 107 98 - 107 mmol/L    CO2 23.0 22.0 - 29.0 mmol/L    Calcium 9.3 8.6 - 10.5 mg/dL    Total Protein 6.6 6.0 - 8.5 g/dL    Albumin 4.00 3.50 - 5.20 g/dL    ALT (SGPT) 15 1 - 33 U/L    AST (SGOT) 13 1 - 32 U/L    Alkaline Phosphatase 51 39 - 117 U/L    Total Bilirubin 0.3 0.0 - 1.2 mg/dL    Globulin 2.6 gm/dL    A/G Ratio 1.5 g/dL    BUN/Creatinine Ratio 13.4 7.0 - 25.0    Anion Gap 9.0 5.0 - 15.0 mmol/L    eGFR 91.2 >60.0 mL/min/1.73   BNP    Collection Time: 11/14/22  6:34 PM    Specimen: Blood   Result Value Ref  Range    proBNP 28.1 0.0 - 450.0 pg/mL   Troponin    Collection Time: 11/14/22  6:34 PM    Specimen: Blood   Result Value Ref Range    Troponin T <0.010 0.000 - 0.030 ng/mL   Green Top (Gel)    Collection Time: 11/14/22  6:34 PM   Result Value Ref Range    Extra Tube Hold for add-ons.    Lavender Top    Collection Time: 11/14/22  6:34 PM   Result Value Ref Range    Extra Tube hold for add-on    Light Blue Top    Collection Time: 11/14/22  6:34 PM   Result Value Ref Range    Extra Tube Hold for add-ons.    CBC Auto Differential    Collection Time: 11/14/22  6:34 PM    Specimen: Blood   Result Value Ref Range    WBC 12.94 (H) 3.40 - 10.80 10*3/mm3    RBC 4.81 3.77 - 5.28 10*6/mm3    Hemoglobin 13.9 12.0 - 15.9 g/dL    Hematocrit 41.6 34.0 - 46.6 %    MCV 86.5 79.0 - 97.0 fL    MCH 28.9 26.6 - 33.0 pg    MCHC 33.4 31.5 - 35.7 g/dL    RDW 13.0 12.3 - 15.4 %    RDW-SD 40.4 37.0 - 54.0 fl    MPV 9.5 6.0 - 12.0 fL    Platelets 228 140 - 450 10*3/mm3    Neutrophil % 54.2 42.7 - 76.0 %    Lymphocyte % 33.3 19.6 - 45.3 %    Monocyte % 8.9 5.0 - 12.0 %    Eosinophil % 2.2 0.3 - 6.2 %    Basophil % 0.5 0.0 - 1.5 %    Immature Grans % 0.9 (H) 0.0 - 0.5 %    Neutrophils, Absolute 7.01 (H) 1.70 - 7.00 10*3/mm3    Lymphocytes, Absolute 4.31 (H) 0.70 - 3.10 10*3/mm3    Monocytes, Absolute 1.15 (H) 0.10 - 0.90 10*3/mm3    Eosinophils, Absolute 0.29 0.00 - 0.40 10*3/mm3    Basophils, Absolute 0.06 0.00 - 0.20 10*3/mm3    Immature Grans, Absolute 0.12 (H) 0.00 - 0.05 10*3/mm3    nRBC 0.0 0.0 - 0.2 /100 WBC   D-dimer, Quantitative    Collection Time: 11/14/22  6:34 PM    Specimen: Blood   Result Value Ref Range    D-Dimer, Quantitative 0.40 0.00 - 0.49 MCGFEU/mL   hCG, Serum, Qualitative    Collection Time: 11/14/22  6:34 PM    Specimen: Blood   Result Value Ref Range    HCG Qualitative Negative Negative       Ordered the above labs and independently reviewed the results.        RADIOLOGY  XR Chest 2 View    Result Date: 11/14/2022  XR  CHEST 2 VW-clinical: Shortness of breath  FINDINGS: The cardiomediastinal silhouette is normal and the lungs are clear.  CONCLUSION: Normal chest  This report was finalized on 11/14/2022 6:49 PM by Dr. Vasiliy Barboza M.D.      CT Angiogram Chest    Result Date: 11/14/2022  CT ANGIOGRAM OF THE CHEST  HISTORY: Shortness of air  COMPARISON: None available.  TECHNIQUE: Axial CT imaging was obtained through the thorax. IV contrast was administered. Three-D reformatted images were obtained.  FINDINGS: No acute pulmonary thromboembolus is seen. Thoracic aorta is normal in caliber. There is no evidence of dissection. There is no pleural or pericardial effusion. Coronary artery calcifications are seen. Thyroid gland is atrophic. Trachea is within normal limits. There is a small hiatal hernia. No acute infiltrates are identified. No pulmonary nodules or masses are seen. Mediastinal lymph nodes do not appear pathologically enlarged. No acute abnormalities are seen within the upper abdomen. No acute osseous abnormalities are seen.      No acute intrathoracic findings.  Radiation dose reduction techniques were utilized, including automated exposure control and exposure modulation based on body size.  This report was finalized on 11/14/2022 10:00 PM by Dr. Aurelia Jay M.D.        I ordered the above noted radiological studies. Reviewed by me and discussed with radiologist.  See dictation for official radiology interpretation.      PROCEDURES    Procedures      MEDICATIONS GIVEN IN ER    Medications   sodium chloride 0.9 % flush 10 mL (has no administration in time range)   ALPRAZolam (XANAX) tablet 0.25 mg (0.25 mg Oral Given 11/14/22 1917)   iopamidol (ISOVUE-370) 76 % injection 100 mL (100 mL Intravenous Given 11/14/22 2115)         PROGRESS, DATA ANALYSIS, CONSULTS, AND MEDICAL DECISION MAKING    All labs have been independently reviewed by me.  All radiology studies have been reviewed by me and discussed with  "radiologist dictating the report.   EKG's independently viewed and interpreted by me.  Discussion below represents my analysis of pertinent findings related to patient's condition, differential diagnosis, treatment plan and final disposition.        ED Course as of 11/14/22 2304   Mon Nov 14, 2022 2302 Mild leukocytosis, chemistry unremarkable [DP]   2302 Troponin and proBNP are normal [DP]   2303 D-dimer is negative [DP]   2303 Chest x-ray is negative [DP]   2303 CT angiogram of chest shows no evidence for PE, no cardiomegaly or pleural effusions.  No sign of infectious process [DP]   2303 EKG at 1800  Normal sinus rhythm in the 80  Normal HI, QRS and QT no acute ST segment changes seen to suggest ischemia, and there is no change with compared to previous from October 19, 2022   [DP]   2303 Patient has a heart score of 1  Her dimer and CT angiogram are negative for VTE and I think this adequately excludes PE  She has gotten PFTs within the last month or 2 and was told that they were \"normal\"    While certainly do not doubt that she is feeling these symptoms subjectively, I am not sure what the clinical significance is.  I think it is appropriate for her to follow-up with cardiologist tomorrow as planned and let them take over from here. [DP]      ED Course User Index  [DP] Timothy Pan MD           PPE: The patient wore a surgical mask throughout the entire patient encounter. I wore an N95.    AS OF 23:04 EST VITALS:    BP - 138/87  HR - 87  TEMP - 98 °F (36.7 °C) (Oral)  O2 SATS - 100%        DIAGNOSIS  Final diagnoses:   Dyspnea, unspecified type         DISPOSITION  Discharge           Timothy aPn MD  11/14/22 2305    "

## 2022-11-14 NOTE — ED NOTES
"PT states she has \"long covid\" and has had trouble breathing since October  but has worsened today.   "

## 2022-11-14 NOTE — ED TRIAGE NOTES
Pt is soa - she is breathing fast and was able to slow respirations at the triage desk.      Patient was placed in face mask during first look triage.  Patient was wearing a face mask throughout encounter.  I wore personal protective equipment throughout the encounter.  Hand hygiene was performed before and after patient encounter.

## 2022-11-15 ENCOUNTER — OFFICE VISIT (OUTPATIENT)
Dept: CARDIOLOGY | Facility: CLINIC | Age: 42
End: 2022-11-15

## 2022-11-15 VITALS
HEART RATE: 110 BPM | WEIGHT: 211 LBS | DIASTOLIC BLOOD PRESSURE: 70 MMHG | BODY MASS INDEX: 35.16 KG/M2 | OXYGEN SATURATION: 99 % | SYSTOLIC BLOOD PRESSURE: 122 MMHG | HEIGHT: 65 IN

## 2022-11-15 DIAGNOSIS — R06.09 DOE (DYSPNEA ON EXERTION): Primary | ICD-10-CM

## 2022-11-15 DIAGNOSIS — U09.9 POST-COVID SYNDROME: ICD-10-CM

## 2022-11-15 DIAGNOSIS — R00.0 TACHYCARDIA: ICD-10-CM

## 2022-11-15 LAB — QT INTERVAL: 361 MS

## 2022-11-15 PROCEDURE — 93000 ELECTROCARDIOGRAM COMPLETE: CPT | Performed by: NURSE PRACTITIONER

## 2022-11-15 PROCEDURE — 99214 OFFICE O/P EST MOD 30 MIN: CPT | Performed by: NURSE PRACTITIONER

## 2022-11-15 NOTE — ED NOTES
Ambulated pt with pulse ox. Before ambulating 97% RA, during ambulating %. Pt became increasingly SOB with labored respirations.

## 2022-11-15 NOTE — PROGRESS NOTES
Date of Office Visit: 11/15/2022  Encounter Provider: BROOK Payton  Place of Service: [facility] CARDIOLOGY  Patient Name: Ni Rodríguez  :1980    Chief Complaint   Patient presents with   • Follow-up   :     HPI: Ni Rodríguez is a 42 y.o. female who is a patient of Dr. Walter and is new to me today.  She saw us earlier this year after having COVID 19 and had some cough and dyspnea with exertion.  She also had some left-sided discomfort in her chest under the left breast radiating to the back that was worse when she pressed on it.  She is also an .  She does not have hypertension or hyperlipidemia and is otherwise healthy.  We recommended a little bit of weight loss and gradually get back into exercise program.  Her EKG was unremarkable.  She had had an echo in February that was unremarkable.    She recently started having some palpitations.  Her heart has been racing at rest.  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  Last night she went to the emergency room because she felt like she could not get her breath.  Her work-up there was unremarkable.  Previous testing and notes have been reviewed by me.   Past Medical History:   Diagnosis Date   • Bright red blood per rectum    • COVID    • Disease of thyroid gland    • Environmental allergies    • History of prior pregnancies      3   • Miscarriage     X 1   • URI (upper respiratory infection)        History reviewed. No pertinent surgical history.    Social History     Socioeconomic History   • Marital status:    • Number of children: 2   • Years of education: masters in education   Tobacco Use   • Smoking status: Never   • Smokeless tobacco: Never   Vaping Use   • Vaping Use: Never used   Substance and Sexual Activity   • Alcohol use: Yes     Alcohol/week: 1.0 standard drink     Types: 1 Glasses of wine per week    • Drug use: No   • Sexual activity: Yes     Partners: Male     Birth control/protection: OCP       Family History   Problem Relation Age of Onset   • Arthritis Mother         Osteoarthritis   • Thyroid disease Mother    • Hypertension Father    • Pancreatitis Father         GB complication - 1/2 removed   • Hyperlipidemia Father    • Diabetes Father    • Other Sister         Hypoglycemia   • Hypothyroidism Brother    • Uterine cancer Maternal Grandmother    • Alzheimer's disease Maternal Grandfather    • Prostate cancer Maternal Grandfather    • Arthritis Paternal Grandmother         Osteoarthritis   • Prostate cancer Paternal Grandfather        Review of Systems   Constitutional: Negative for diaphoresis and malaise/fatigue.   Cardiovascular: Negative for chest pain, claudication, dyspnea on exertion, irregular heartbeat, leg swelling, near-syncope, orthopnea, palpitations, paroxysmal nocturnal dyspnea and syncope.   Respiratory: Negative for cough, shortness of breath and sleep disturbances due to breathing.    Musculoskeletal: Negative for falls.   Neurological: Negative for dizziness and weakness.   Psychiatric/Behavioral: Negative for altered mental status and substance abuse.       Allergies   Allergen Reactions   • Bee Venom Hives, Itching and Other (See Comments)     Localized reaction redness   • Wasp Venom Protein Hives and Itching     Localized reaction redness         Current Outpatient Medications:   •  albuterol sulfate  (90 Base) MCG/ACT inhaler, INHALE 2 PUFFS EVERY 4 (FOUR) HOURS AS NEEDED FOR WHEEZING OR SHORTNESS OF AIR., Disp: 18 g, Rfl: 3  •  azelastine (ASTELIN) 0.1 % nasal spray, , Disp: , Rfl:   •  Budeson-Glycopyrrol-Formoterol (BREZTRI) 160-9-4.8 MCG/ACT aerosol inhaler, Inhale 2 puffs 2 (Two) Times a Day., Disp: , Rfl:   •  clindamycin (CLINDAGEL) 1 % gel, APPLY TO AFFECTED AREAS IN SCALP DAILY, Disp: , Rfl:   •  [employer] Cortisone Maximum Strength 1 % ointment, APPLY TOPICALLY TO THE  "APPROPRIATE AREA AS DIRECTED 2 (TWO) TIMES A DAY., Disp: 56 g, Rfl: 0  •  diclofenac (VOLTAREN) 75 MG EC tablet, Take 75 mg by mouth 2 (Two) Times a Day., Disp: , Rfl:   •  drospirenone-ethinyl estradiol (MATT,OCELLA) 3-0.03 MG per tablet, Take 1 tablet by mouth Daily., Disp: 84 tablet, Rfl: 3  •  Erenumab-aooe (Aimovig) 140 MG/ML prefilled syringe, Inject 140 mg under the skin into the appropriate area as directed., Disp: , Rfl:   •  fexofenadine (ALLEGRA) 180 MG tablet, Take 180 mg by mouth Daily., Disp: , Rfl:   •  FLUoxetine (PROzac) 40 MG capsule, Take 40 mg by mouth Daily., Disp: , Rfl:   •  ketoconazole (NIZORAL) 2 % shampoo, SHAMPOO SCALP EVERY OTHER DAY, Disp: , Rfl:   •  mupirocin (BACTROBAN) 2 % ointment, APPLY TOPICALLY TO THE APPROPRIATE AREA AS DIRECTED 3 (THREE) TIMES A DAY., Disp: 22 g, Rfl: 0  •  OnabotulinumtoxinA 200 units reconstituted solution, Inject 200 Units into the appropriate muscle as directed by prescriber., Disp: , Rfl:   •  Rimegepant Sulfate (NURTEC) 75 MG tablet dispersible tablet, Take 1 tablet by mouth Every Other Day., Disp: , Rfl:   •  Synthroid 50 MCG tablet, TAKE 1 TABLET BY MOUTH EVERY DAY, Disp: 90 tablet, Rfl: 3  No current facility-administered medications for this visit.      Objective:     Vitals:    11/15/22 1403   BP: 122/70   Pulse: 110   SpO2: 99%   Weight: 95.7 kg (211 lb)   Height: 165.1 cm (65\")     Body mass index is 35.11 kg/m².    PHYSICAL EXAM:    Constitutional:       General: Not in acute distress.     Appearance: Normal appearance. Well-developed.   Eyes:      Pupils: Pupils are equal, round, and reactive to light.   HENT:      Head: Normocephalic.   Neck:      Vascular: No carotid bruit or JVD.   Pulmonary:      Effort: Pulmonary effort is normal. No tachypnea.      Breath sounds: Normal breath sounds. No wheezing. No rales.   Cardiovascular:      Normal rate. Regular rhythm.      No gallop.   Pulses:     Intact distal pulses.   Edema:     Peripheral " edema absent.   Abdominal:      General: Bowel sounds are normal.      Palpations: Abdomen is soft.      Tenderness: There is no abdominal tenderness.   Musculoskeletal: Normal range of motion.      Cervical back: Normal range of motion and neck supple. No edema. Skin:     General: Skin is warm and dry.   Neurological:      Mental Status: Alert and oriented to person, place, and time.           ECG 12 Lead    Date/Time: 11/15/2022 2:26 PM  Performed by: Kayla Mcgee APRN  Authorized by: Kayla Mcgee APRN   Comparison: compared with previous ECG from 10/19/2022  Similar to previous ECG  Rhythm: sinus rhythm  Rate: normal  QRS axis: normal    Clinical impression: normal ECG              Assessment:       Diagnosis Plan   1. WORLEY (dyspnea on exertion)        2. Tachycardia  Holter Monitor - 48 Hour      3. Post-COVID syndrome          Orders Placed This Encounter   Procedures   • Holter Monitor - 48 Hour     Standing Status:   Future     Standing Expiration Date:   11/15/2023     Order Specific Question:   Reason for exam?     Answer:   Palpitations     Order Specific Question:   Release to patient     Answer:   Routine Release   • ECG 12 Lead     This order was created via procedure documentation     Order Specific Question:   Release to patient     Answer:   Routine Release          Plan:       I am going to put a 48-hour Holter on her today.  I told her to try to do some regular activity stay well-hydrated and watch her caffeine intake.  I will call her with Holter results.         Your medication list          Accurate as of November 15, 2022  2:27 PM. If you have any questions, ask your nurse or doctor.            CONTINUE taking these medications      Instructions Last Dose Given Next Dose Due   Aimovig 140 MG/ML prefilled syringe  Generic drug: Erenumab-aooe      Inject 140 mg under the skin into the appropriate area as directed.       albuterol sulfate  (90 Base) MCG/ACT inhaler  Commonly  known as: PROVENTIL HFA;VENTOLIN HFA;PROAIR HFA      INHALE 2 PUFFS EVERY 4 (FOUR) HOURS AS NEEDED FOR WHEEZING OR SHORTNESS OF AIR.       azelastine 0.1 % nasal spray  Commonly known as: ASTELIN           Budeson-Glycopyrrol-Formoterol 160-9-4.8 MCG/ACT aerosol inhaler  Commonly known as: BREZTRI      Inhale 2 puffs 2 (Two) Times a Day.       clindamycin 1 % gel  Commonly known as: CLINDAGEL      APPLY TO AFFECTED AREAS IN SCALP DAILY       CVS Cortisone Maximum Strength 1 % ointment  Generic drug: hydrocortisone      APPLY TOPICALLY TO THE APPROPRIATE AREA AS DIRECTED 2 (TWO) TIMES A DAY.       diclofenac 75 MG EC tablet  Commonly known as: VOLTAREN      Take 75 mg by mouth 2 (Two) Times a Day.       drospirenone-ethinyl estradiol 3-0.03 MG per tablet  Commonly known as: MATT,OCELLA      Take 1 tablet by mouth Daily.       fexofenadine 180 MG tablet  Commonly known as: ALLEGRA      Take 180 mg by mouth Daily.       FLUoxetine 40 MG capsule  Commonly known as: PROzac      Take 40 mg by mouth Daily.       ketoconazole 2 % shampoo  Commonly known as: NIZORAL      SHAMPOO SCALP EVERY OTHER DAY       mupirocin 2 % ointment  Commonly known as: BACTROBAN      APPLY TOPICALLY TO THE APPROPRIATE AREA AS DIRECTED 3 (THREE) TIMES A DAY.       OnabotulinumtoxinA 200 units reconstituted solution      Inject 200 Units into the appropriate muscle as directed by prescriber.       Rimegepant Sulfate 75 MG tablet dispersible tablet  Commonly known as: NURTEC      Take 1 tablet by mouth Every Other Day.       Synthroid 50 MCG tablet  Generic drug: levothyroxine      TAKE 1 TABLET BY MOUTH EVERY DAY                As always, it has been a pleasure to participate in your patient's care.      Sincerely,     Kayla DOE

## 2022-11-21 ENCOUNTER — APPOINTMENT (OUTPATIENT)
Dept: CARDIOLOGY | Facility: HOSPITAL | Age: 42
End: 2022-11-21

## 2022-11-21 ENCOUNTER — OFFICE VISIT (OUTPATIENT)
Dept: FAMILY MEDICINE CLINIC | Facility: CLINIC | Age: 42
End: 2022-11-21

## 2022-11-21 VITALS
WEIGHT: 210 LBS | HEART RATE: 85 BPM | RESPIRATION RATE: 17 BRPM | SYSTOLIC BLOOD PRESSURE: 100 MMHG | TEMPERATURE: 98.2 F | HEIGHT: 65 IN | BODY MASS INDEX: 34.99 KG/M2 | OXYGEN SATURATION: 98 % | DIASTOLIC BLOOD PRESSURE: 68 MMHG

## 2022-11-21 DIAGNOSIS — U09.9 POST-COVID SYNDROME: Primary | ICD-10-CM

## 2022-11-21 PROCEDURE — 99213 OFFICE O/P EST LOW 20 MIN: CPT | Performed by: PHYSICIAN ASSISTANT
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
## 2022-12-06 ENCOUNTER — PATIENT MESSAGE (OUTPATIENT)
Dept: CARDIOLOGY | Facility: CLINIC | Age: 42
End: 2022-12-06

## 2022-12-06 ENCOUNTER — TELEPHONE (OUTPATIENT)
Dept: CARDIOLOGY | Facility: CLINIC | Age: 42
End: 2022-12-06

## 2022-12-06 DIAGNOSIS — I25.9 CHEST PAIN DUE TO MYOCARDIAL ISCHEMIA, UNSPECIFIED ISCHEMIC CHEST PAIN TYPE: Primary | ICD-10-CM

## 2022-12-06 NOTE — TELEPHONE ENCOUNTER
Called patient about Holter monitor.  She says she is still having significant heart racing.  Sometimes she cannot catch her breath.  I discussed that there were no sustained arrhythmias just sinus tachycardia.  I am luz marina put her on a low-dose beta-blocker to see if this helps.  Today she had the sensation that there was been a heavy weight on her chest so I am going to schedule her for stress test.

## 2022-12-06 NOTE — TELEPHONE ENCOUNTER
Patient called office transferred to triage.  She said LR left message about test results (holter).  She has a meeting coming up but is available after 315 today.  She will keep her phone on her.    Callback # [phone]    Thanks  Vicky Huitron RN  [facility] Triage  12/06/22 13:27 EST

## 2022-12-12 DIAGNOSIS — G47.00 INSOMNIA, UNSPECIFIED TYPE: Primary | ICD-10-CM

## 2022-12-12 RX ORDER — TRAZODONE HYDROCHLORIDE 50 MG/1
50 TABLET ORAL NIGHTLY
Qty: 30 TABLET | Refills: 0 | Status: SHIPPED | OUTPATIENT
Start: 2022-12-12 | End: 2023-01-23

## 2022-12-19 ENCOUNTER — HOSPITAL ENCOUNTER (OUTPATIENT)
Dept: CARDIOLOGY | Facility: HOSPITAL | Age: 42
Discharge: HOME | End: 2022-12-19
Admitting: NURSE PRACTITIONER

## 2022-12-19 VITALS — HEIGHT: 65 IN | BODY MASS INDEX: 35.26 KG/M2 | WEIGHT: 211.64 LBS

## 2022-12-19 DIAGNOSIS — I25.9 CHEST PAIN DUE TO MYOCARDIAL ISCHEMIA, UNSPECIFIED ISCHEMIC CHEST PAIN TYPE: ICD-10-CM

## 2022-12-19 LAB
BH CV NUCLEAR PRIOR STUDY: 2
BH CV STRESS BP STAGE 1: NORMAL
BH CV STRESS BP STAGE 2: NORMAL
BH CV STRESS BP STAGE 3: NORMAL
BH CV STRESS DURATION MIN STAGE 1: 3
BH CV STRESS DURATION MIN STAGE 2: 3
BH CV STRESS DURATION MIN STAGE 3: 3
BH CV STRESS DURATION MIN STAGE 4: 1
BH CV STRESS DURATION SEC STAGE 1: 0
BH CV STRESS DURATION SEC STAGE 2: 0
BH CV STRESS DURATION SEC STAGE 3: 0
BH CV STRESS DURATION SEC STAGE 4: 0
BH CV STRESS GRADE STAGE 1: 10
BH CV STRESS GRADE STAGE 2: 12
BH CV STRESS GRADE STAGE 3: 14
BH CV STRESS GRADE STAGE 4: 16
BH CV STRESS HR STAGE 1: 110
BH CV STRESS HR STAGE 2: 132
BH CV STRESS HR STAGE 3: 145
BH CV STRESS HR STAGE 4: 150
BH CV STRESS METS STAGE 1: 5
BH CV STRESS METS STAGE 2: 7.5
BH CV STRESS METS STAGE 3: 10
BH CV STRESS METS STAGE 4: 11
BH CV STRESS NUCLEAR ISOTOPE DOSE: 35.9 MCI
BH CV STRESS PROTOCOL 1: NORMAL
BH CV STRESS RECOVERY BP: NORMAL MMHG
BH CV STRESS RECOVERY HR: 99 BPM
BH CV STRESS SPEED STAGE 1: 1.7
BH CV STRESS SPEED STAGE 2: 2.5
BH CV STRESS SPEED STAGE 3: 3.4
BH CV STRESS SPEED STAGE 4: 4.2
BH CV STRESS STAGE 1: 1
BH CV STRESS STAGE 2: 2
BH CV STRESS STAGE 3: 3
BH CV STRESS STAGE 4: 4
LV EF NUC BP: 73 %
MAXIMAL PREDICTED HEART RATE: 177 BPM
PERCENT MAX PREDICTED HR: 84.75 %
STRESS BASELINE BP: NORMAL MMHG
STRESS BASELINE HR: 89 BPM
STRESS PERCENT HR: 100 %
STRESS POST ESTIMATED WORKLOAD: 11 METS
STRESS POST EXERCISE DUR MIN: 10 MIN
STRESS POST EXERCISE DUR SEC: 0 SEC
STRESS POST PEAK BP: NORMAL MMHG
STRESS POST PEAK HR: 150 BPM
STRESS TARGET HR: 150 BPM

## 2022-12-19 PROCEDURE — 93017 CV STRESS TEST TRACING ONLY: CPT | Performed by: STUDENT IN AN ORGANIZED HEALTH CARE EDUCATION/TRAINING PROGRAM

## 2022-12-19 PROCEDURE — A9502 TC99M TETROFOSMIN: HCPCS | Performed by: NURSE PRACTITIONER

## 2022-12-19 PROCEDURE — 93018 CV STRESS TEST I&R ONLY: CPT | Performed by: STUDENT IN AN ORGANIZED HEALTH CARE EDUCATION/TRAINING PROGRAM

## 2022-12-19 PROCEDURE — 78451 HT MUSCLE IMAGE SPECT SING: CPT

## 2022-12-19 PROCEDURE — 93016 CV STRESS TEST SUPVJ ONLY: CPT | Performed by: STUDENT IN AN ORGANIZED HEALTH CARE EDUCATION/TRAINING PROGRAM

## 2022-12-19 PROCEDURE — 0 TECHNETIUM TETROFOSMIN KIT: Performed by: NURSE PRACTITIONER

## 2022-12-19 PROCEDURE — 78451 HT MUSCLE IMAGE SPECT SING: CPT | Performed by: STUDENT IN AN ORGANIZED HEALTH CARE EDUCATION/TRAINING PROGRAM

## 2022-12-19 PROCEDURE — 93017 CV STRESS TEST TRACING ONLY: CPT

## 2022-12-19 RX ADMIN — TETROFOSMIN 1 DOSE: 1.38 INJECTION, POWDER, LYOPHILIZED, FOR SOLUTION INTRAVENOUS at 11:57

## 2022-12-20 ENCOUNTER — OFFICE VISIT (OUTPATIENT)
Dept: CARDIOLOGY | Facility: CLINIC | Age: 42
End: 2022-12-20

## 2022-12-20 VITALS
OXYGEN SATURATION: 97 % | WEIGHT: 218 LBS | HEIGHT: 65 IN | SYSTOLIC BLOOD PRESSURE: 101 MMHG | BODY MASS INDEX: 36.32 KG/M2 | DIASTOLIC BLOOD PRESSURE: 80 MMHG | HEART RATE: 85 BPM

## 2022-12-20 DIAGNOSIS — R00.0 TACHYCARDIA: Primary | ICD-10-CM

## 2022-12-20 PROCEDURE — 99214 OFFICE O/P EST MOD 30 MIN: CPT | Performed by: NURSE PRACTITIONER

## 2022-12-20 PROCEDURE — 93000 ELECTROCARDIOGRAM COMPLETE: CPT | Performed by: NURSE PRACTITIONER

## 2022-12-20 NOTE — PROGRESS NOTES
Date of Office Visit: 2022  Encounter Provider: BROOK Payton  Place of Service: [facility] CARDIOLOGY  Patient Name: Ni Rodríguez  :1980    Chief Complaint   Patient presents with   • Follow-up   :     HPI: Ni Rodríguez is a 42 y.o. female who is a patient of  Dr. Walter is known to me from previous.  She was seen in February by Dr. Walter and had had COVID 3 weeks prior.  She is having little dyspnea with some fevers chills and sweats.  She had a little discomfort in her left chest under the left breast radiating to the back.  I saw her in November for follow-up overall she was doing well she is an  she had started having some palpitations and her Fitbit said that her heart rate was 150 and she was short of breath.  She was also having some chest pain so I went ahead and did a stress test.  It was negative for ischemia.  Her Holter monitor showed some very rare PVCs and some SVT with 4 beats at a time.  There were no sustainable arrhythmias average heart rate was 96 with lowest heart rate of 72.  When I called her about her heart monitor on  she was said she was still having episodes where she could not catch her breath and her heart rhythm was in a sinus tach.  I put her on a low-dose of beta-blocker to see if this would help.    Since starting the beta-blocker she has not noticed much in the terms of heart racing except she is really not been paying much attention to it.  Her blood pressure is stable she notices no change in fatigue or stamina.  Previous testing and notes have been reviewed by me.   Past Medical History:   Diagnosis Date   • Bright red blood per rectum    • COVID    • Disease of thyroid gland    • Environmental allergies    • History of prior pregnancies      3   • Miscarriage     X 1   • URI (upper respiratory infection)        History reviewed. No pertinent surgical history.    Social History      Socioeconomic History   • Marital status:    • Number of children: 2   • Years of education: masters in education   Tobacco Use   • Smoking status: Never   • Smokeless tobacco: Never   Vaping Use   • Vaping Use: Never used   Substance and Sexual Activity   • Alcohol use: Yes     Alcohol/week: 1.0 standard drink     Types: 1 Glasses of wine per week   • Drug use: No   • Sexual activity: Yes     Partners: Male     Birth control/protection: OCP       Family History   Problem Relation Age of Onset   • Arthritis Mother         Osteoarthritis   • Thyroid disease Mother    • Hypertension Father    • Pancreatitis Father         GB complication - 1/2 removed   • Hyperlipidemia Father    • Diabetes Father    • Other Sister         Hypoglycemia   • Hypothyroidism Brother    • Uterine cancer Maternal Grandmother    • Alzheimer's disease Maternal Grandfather    • Prostate cancer Maternal Grandfather    • Arthritis Paternal Grandmother         Osteoarthritis   • Prostate cancer Paternal Grandfather        Review of Systems   Constitutional: Positive for malaise/fatigue. Negative for diaphoresis.   Cardiovascular: Negative for chest pain, claudication, dyspnea on exertion, irregular heartbeat, leg swelling, near-syncope, orthopnea, palpitations, paroxysmal nocturnal dyspnea and syncope.   Respiratory: Negative for cough, shortness of breath and sleep disturbances due to breathing.    Musculoskeletal: Negative for falls.   Neurological: Negative for dizziness and weakness.   Psychiatric/Behavioral: Negative for altered mental status and substance abuse.       Allergies   Allergen Reactions   • Bee Venom Hives, Itching and Other (See Comments)     Localized reaction redness   • Wasp Venom Protein Hives and Itching     Localized reaction redness         Current Outpatient Medications:   •  albuterol sulfate  (90 Base) MCG/ACT inhaler, INHALE 2 PUFFS EVERY 4 (FOUR) HOURS AS NEEDED FOR WHEEZING OR SHORTNESS OF AIR.,  Disp: 18 g, Rfl: 3  •  azelastine (ASTELIN) 0.1 % nasal spray, , Disp: , Rfl:   •  Budeson-Glycopyrrol-Formoterol (BREZTRI) 160-9-4.8 MCG/ACT aerosol inhaler, Inhale 2 puffs 2 (Two) Times a Day., Disp: , Rfl:   •  clindamycin (CLINDAGEL) 1 % gel, APPLY TO AFFECTED AREAS IN SCALP DAILY, Disp: , Rfl:   •  CVS Cortisone Maximum Strength 1 % ointment, APPLY TOPICALLY TO THE APPROPRIATE AREA AS DIRECTED 2 (TWO) TIMES A DAY., Disp: 56 g, Rfl: 0  •  diclofenac (VOLTAREN) 75 MG EC tablet, Take 75 mg by mouth 2 (Two) Times a Day., Disp: , Rfl:   •  drospirenone-ethinyl estradiol (MATT,OCELLA) 3-0.03 MG per tablet, Take 1 tablet by mouth Daily., Disp: 84 tablet, Rfl: 3  •  Erenumab-aooe (Aimovig) 140 MG/ML prefilled syringe, Inject 140 mg under the skin into the appropriate area as directed., Disp: , Rfl:   •  fexofenadine (ALLEGRA) 180 MG tablet, Take 180 mg by mouth Daily., Disp: , Rfl:   •  FLUoxetine (PROzac) 40 MG capsule, Take 40 mg by mouth Daily., Disp: , Rfl:   •  ketoconazole (NIZORAL) 2 % shampoo, SHAMPOO SCALP EVERY OTHER DAY, Disp: , Rfl:   •  metoprolol tartrate (LOPRESSOR) 25 MG tablet, Take 0.5 tablets by mouth 2 (Two) Times a Day., Disp: 30 tablet, Rfl: 11  •  mupirocin (BACTROBAN) 2 % ointment, APPLY TOPICALLY TO THE APPROPRIATE AREA AS DIRECTED 3 (THREE) TIMES A DAY., Disp: 22 g, Rfl: 0  •  OnabotulinumtoxinA 200 units reconstituted solution, Inject 200 Units into the appropriate muscle as directed by prescriber., Disp: , Rfl:   •  Rimegepant Sulfate (NURTEC) 75 MG tablet dispersible tablet, Take 1 tablet by mouth Every Other Day., Disp: , Rfl:   •  Synthroid 50 MCG tablet, TAKE 1 TABLET BY MOUTH EVERY DAY, Disp: 90 tablet, Rfl: 3  •  traZODone (DESYREL) 50 MG tablet, Take 1 tablet by mouth Every Night., Disp: 30 tablet, Rfl: 0  No current facility-administered medications for this visit.      Objective:     Vitals:    12/20/22 1044   BP: 101/80   Pulse: 85   SpO2: 97%   Weight: 98.9 kg (218 lb)   Height:  "165.1 cm (65\")     Body mass index is 36.28 kg/m².    PHYSICAL EXAM:    Constitutional:       General: Not in acute distress.     Appearance: Normal appearance. Well-developed.   Eyes:      Pupils: Pupils are equal, round, and reactive to light.   HENT:      Head: Normocephalic.   Neck:      Vascular: No carotid bruit or JVD.   Pulmonary:      Effort: Pulmonary effort is normal. No tachypnea.      Breath sounds: Normal breath sounds. No wheezing. No rales.   Cardiovascular:      Normal rate. Regular rhythm.      No gallop.   Pulses:     Intact distal pulses.   Edema:     Peripheral edema absent.   Abdominal:      General: Bowel sounds are normal.      Palpations: Abdomen is soft.      Tenderness: There is no abdominal tenderness.   Musculoskeletal: Normal range of motion.      Cervical back: Normal range of motion and neck supple. No edema. Skin:     General: Skin is warm and dry.   Neurological:      Mental Status: Alert and oriented to person, place, and time.           ECG 12 Lead    Date/Time: 12/20/2022 10:56 AM  Performed by: Kayla Mcgee APRN  Authorized by: Kayla Mcgee APRN   Comparison: compared with previous ECG from 11/15/2022  Similar to previous ECG  Rhythm: sinus rhythm  Rate: normal  QRS axis: normal    Clinical impression: normal ECG              Assessment:       Diagnosis Plan   1. Tachycardia          Orders Placed This Encounter   Procedures   • ECG 12 Lead     This order was created via procedure documentation     Order Specific Question:   Release to patient     Answer:   Routine Release          Plan:       I am going to leave her medications as is she is wearing another Holter monitor which I am not really sure why so working to go ahead and take that off today.  Previous Holter as discussed above.  We will see her back in 3 months time.  I have encouraged her to try to get some routine exercise and try to build up endurance as this may help her symptoms.         Your medication " list          Accurate as of December 20, 2022 11:08 AM. If you have any questions, ask your nurse or doctor.            CONTINUE taking these medications      Instructions Last Dose Given Next Dose Due   Aimovig 140 MG/ML prefilled syringe  Generic drug: Erenumab-aooe      Inject 140 mg under the skin into the appropriate area as directed.       albuterol sulfate  (90 Base) MCG/ACT inhaler  Commonly known as: PROVENTIL HFA;VENTOLIN HFA;PROAIR HFA      INHALE 2 PUFFS EVERY 4 (FOUR) HOURS AS NEEDED FOR WHEEZING OR SHORTNESS OF AIR.       azelastine 0.1 % nasal spray  Commonly known as: ASTELIN           Budeson-Glycopyrrol-Formoterol 160-9-4.8 MCG/ACT aerosol inhaler  Commonly known as: BREZTRI      Inhale 2 puffs 2 (Two) Times a Day.       clindamycin 1 % gel  Commonly known as: CLINDAGEL      APPLY TO AFFECTED AREAS IN SCALP DAILY       CVS Cortisone Maximum Strength 1 % ointment  Generic drug: hydrocortisone      APPLY TOPICALLY TO THE APPROPRIATE AREA AS DIRECTED 2 (TWO) TIMES A DAY.       diclofenac 75 MG EC tablet  Commonly known as: VOLTAREN      Take 75 mg by mouth 2 (Two) Times a Day.       drospirenone-ethinyl estradiol 3-0.03 MG per tablet  Commonly known as: MATT,OCELLA      Take 1 tablet by mouth Daily.       fexofenadine 180 MG tablet  Commonly known as: ALLEGRA      Take 180 mg by mouth Daily.       FLUoxetine 40 MG capsule  Commonly known as: PROzac      Take 40 mg by mouth Daily.       ketoconazole 2 % shampoo  Commonly known as: NIZORAL      SHAMPOO SCALP EVERY OTHER DAY       metoprolol tartrate 25 MG tablet  Commonly known as: LOPRESSOR      Take 0.5 tablets by mouth 2 (Two) Times a Day.       mupirocin 2 % ointment  Commonly known as: BACTROBAN      APPLY TOPICALLY TO THE APPROPRIATE AREA AS DIRECTED 3 (THREE) TIMES A DAY.       OnabotulinumtoxinA 200 units reconstituted solution      Inject 200 Units into the appropriate muscle as directed by prescriber.       Rimegepant Sulfate 75 MG  tablet dispersible tablet  Commonly known as: NURTEC      Take 1 tablet by mouth Every Other Day.       Synthroid 50 MCG tablet  Generic drug: levothyroxine      TAKE 1 TABLET BY MOUTH EVERY DAY       traZODone 50 MG tablet  Commonly known as: DESYREL      Take 1 tablet by mouth Every Night.                As always, it has been a pleasure to participate in your patient's care.      Sincerely,     Kayla DOE

## 2023-01-23 ENCOUNTER — OFFICE VISIT (OUTPATIENT)
Dept: FAMILY MEDICINE CLINIC | Facility: CLINIC | Age: 43
End: 2023-01-23
Payer: COMMERCIAL

## 2023-01-23 VITALS
TEMPERATURE: 98.7 F | BODY MASS INDEX: 35.49 KG/M2 | DIASTOLIC BLOOD PRESSURE: 80 MMHG | OXYGEN SATURATION: 100 % | HEART RATE: 97 BPM | RESPIRATION RATE: 15 BRPM | HEIGHT: 65 IN | SYSTOLIC BLOOD PRESSURE: 120 MMHG | WEIGHT: 213 LBS

## 2023-01-23 DIAGNOSIS — J20.9 ACUTE BRONCHITIS, UNSPECIFIED ORGANISM: ICD-10-CM

## 2023-01-23 DIAGNOSIS — F41.1 GAD (GENERALIZED ANXIETY DISORDER): Primary | ICD-10-CM

## 2023-01-23 DIAGNOSIS — J06.9 ACUTE URI: ICD-10-CM

## 2023-01-23 PROCEDURE — 99214 OFFICE O/P EST MOD 30 MIN: CPT | Performed by: PHYSICIAN ASSISTANT

## 2023-01-23 RX ORDER — ALPRAZOLAM 0.5 MG/1
TABLET ORAL
Qty: 30 TABLET | Refills: 0 | Status: SHIPPED | OUTPATIENT
Start: 2023-01-23

## 2023-01-23 RX ORDER — MONTELUKAST SODIUM 10 MG/1
10 TABLET ORAL NIGHTLY
COMMUNITY
Start: 2023-01-12 | End: 2023-02-20 | Stop reason: SDUPTHER

## 2023-01-23 NOTE — PROGRESS NOTES
"Chief Complaint  breathing issues (Cant catch breath)    Subjective          Ni Rodríguez presents to [facility] PRIMARY [facility]ARE  History of Present Illness  Ni is a 42 year old female who presents unable to get a deep breath and having possible panic attacks.  She has been having ongoing issues for the past year due to COVID.  Has seen cardiologist, pulmonologist, and allergist for her breathing issues.  States at times she does not feel like she cannot get a good breath or \"catch my breath\".  This makes her get very dizzy and have heart palpitations.  Gets very anxious.  She has been seen through the ER for this as well.  Was given a Xanax which helped calmed her down.  States when these episodes occur her oxygen levels are always normal.  Her heart rate may range from 70s up to 100.  She has been using her inhalers and oral medications as instructed.  Has also been using her peak flow meter which has been in normal range.  Ni has a upcoming appointment with her pulmonologist in February.  Had stopped taking the trazodone due to making her not feel right.  Denied any suicidal or homicidal ideation.  Denied any current chest pain, wheezing, fever, chills, upper respiratory symptoms.  Appetite and sleep have been normal.  She has been trying to be more active as well.  Review of Systems   Constitutional: Negative.    HENT: Negative.    Eyes: Negative.    Respiratory: Positive for chest tightness. Negative for cough, shortness of breath and wheezing.    Cardiovascular: Positive for palpitations. Negative for chest pain and leg swelling.   Gastrointestinal: Negative.    Endocrine: Negative.    Genitourinary: Negative.    Musculoskeletal: Negative.    Allergic/Immunologic: Negative.    Neurological: Negative.    Hematological: Negative.    Psychiatric/Behavioral: Negative for sleep disturbance and suicidal ideas. The patient is nervous/anxious.    All other systems reviewed and are " "negative.    Objective   Vital Signs:   /80   Pulse 97   Temp 98.7 °F (37.1 °C)   Resp 15   Ht 165.1 cm (65\")   Wt 96.6 kg (213 lb)   SpO2 100%   BMI 35.45 kg/m²     Physical Exam  Vitals and nursing note reviewed.   Constitutional:       Appearance: Normal appearance. She is well-developed and well-groomed. She is obese.      Interventions: Face mask in place.   HENT:      Head: Normocephalic and atraumatic.      Jaw: There is normal jaw occlusion.      Right Ear: Hearing, tympanic membrane, ear canal and external ear normal.      Left Ear: Hearing, tympanic membrane, ear canal and external ear normal.      Nose: Nose normal.      Right Sinus: No maxillary sinus tenderness or frontal sinus tenderness.      Left Sinus: No maxillary sinus tenderness or frontal sinus tenderness.      Mouth/Throat:      Lips: Pink.      Mouth: Mucous membranes are moist.      Dentition: Normal dentition.      Tongue: No lesions.      Pharynx: Oropharynx is clear. Uvula midline.      Tonsils: No tonsillar exudate.   Eyes:      General: Lids are normal.      Conjunctiva/sclera: Conjunctivae normal.      Pupils: Pupils are equal, round, and reactive to light.   Neck:      Thyroid: No thyroid mass, thyromegaly or thyroid tenderness.      Vascular: No carotid bruit.      Trachea: Trachea and phonation normal. No tracheal tenderness.   Cardiovascular:      Rate and Rhythm: Normal rate and regular rhythm.      Pulses: Normal pulses.      Heart sounds: Normal heart sounds, S1 normal and S2 normal. No murmur heard.  Pulmonary:      Effort: Pulmonary effort is normal.      Breath sounds: Normal breath sounds and air entry.   Abdominal:      General: Bowel sounds are normal.      Palpations: Abdomen is soft.      Tenderness: There is no abdominal tenderness. There is no right CVA tenderness, left CVA tenderness, guarding or rebound. Negative signs include Berg's sign, Rovsing's sign, McBurney's sign, psoas sign and obturator sign. "   Musculoskeletal:      Cervical back: Neck supple.      Right lower leg: No edema.      Left lower leg: No edema.   Lymphadenopathy:      Cervical: No cervical adenopathy.      Right cervical: No superficial, deep or posterior cervical adenopathy.     Left cervical: No superficial, deep or posterior cervical adenopathy.   Skin:     General: Skin is warm and dry.      Capillary Refill: Capillary refill takes less than 2 seconds.   Neurological:      Mental Status: She is alert and oriented to person, place, and time.      Deep Tendon Reflexes:      Reflex Scores:       Patellar reflexes are 2+ on the right side and 2+ on the left side.  Psychiatric:         Attention and Perception: Attention and perception normal.         Mood and Affect: Affect normal. Mood is anxious.         Speech: Speech normal.         Behavior: Behavior is cooperative.         Thought Content: Thought content normal.         Cognition and Memory: Cognition and memory normal.         Judgment: Judgment normal.     I wore a facial mask during this patient encounter.  Patient also wearing a surgical mask.  Hand hygiene performed before and after seeing the patient.     Result Review :          Holter Monitor - 48 Hour (11/15/2022 14:39)   Stress Test With Myocardial Perfusion One Day (12/19/2022 13:16)   SCANNED EKG (12/20/2022)   Office Visit with Kayla Mcgee APRN (11/15/2022)   ED with Timothy Pan MD (11/14/2022)   ECG 12 Lead ED Triage Standing Order; SOA (11/14/2022 18:00)            Assessment and Plan    Diagnoses and all orders for this visit:    1. HERBERT (generalized anxiety disorder) (Primary)  -     ALPRAZolam (Xanax) 0.5 MG tablet; Take 0.5 to one tablet twice a day as needed for anxiety  Dispense: 30 tablet; Refill: 0    Ni was seen in office today with new generalized anxiety disorder with possible panic attacks.  I have reviewed above ER report and cardiac testing with her at office visit today.  She will try Xanax  0.5 mg to take half a tablet to a whole tablet up to twice daily for anxiety/panic attacks.  See below for Cyrus information.  Have asked her return to office in 1 month for reevaluation.  She will keep her follow-up appointments with pulmonology as well.  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  She will continue her inhalers and oral medications at home as directed.    As part of this patient's treatment plan I am prescribing controlled substances.  The patient has been made aware of appropriate use of such medications, including potential risk of somnolence. Limited ability to drive and/or work safely, and potential for dependence or overdose.  It has also been made clear that these medications are for use by this patient only, without concomitant use of alcohol or other substances unless prescribed.  CYRUS report has been reviewed and scanned into the patient's chart.  Cyrus number is [id number] dated January 21, 2023.    I spent 22 minutes caring for Ni on this date of service. This time includes time spent by me in the following activities:preparing for the visit, reviewing tests, obtaining and/or reviewing a separately obtained history, performing a medically appropriate examination and/or evaluation , counseling and educating the patient/family/caregiver, ordering medications, tests, or procedures and documenting information in the medical record  Follow Up   Return in about 4 weeks (around 2/20/2023), or anxiety.  Patient was given instructions and counseling regarding her condition or for health maintenance advice. Please see specific information pulled into the AVS if appropriate.     BELINDA Munoz [facility]  [address] [place] [id number]  Dept: [phone]  Dept Fax: [phone]  Loc: [phone]  Loc Fax: [phone]

## 2023-01-24 RX ORDER — ALBUTEROL SULFATE 90 UG/1
AEROSOL, METERED RESPIRATORY (INHALATION)
Qty: 18 G | Refills: 3 | Status: SHIPPED | OUTPATIENT
Start: 2023-01-24

## 2023-02-20 ENCOUNTER — OFFICE VISIT (OUTPATIENT)
Dept: FAMILY MEDICINE CLINIC | Facility: CLINIC | Age: 43
End: 2023-02-20
Payer: COMMERCIAL

## 2023-02-20 VITALS
TEMPERATURE: 97.6 F | DIASTOLIC BLOOD PRESSURE: 80 MMHG | HEIGHT: 65 IN | RESPIRATION RATE: 16 BRPM | SYSTOLIC BLOOD PRESSURE: 120 MMHG | HEART RATE: 98 BPM | BODY MASS INDEX: 35.65 KG/M2 | OXYGEN SATURATION: 99 % | WEIGHT: 214 LBS

## 2023-02-20 DIAGNOSIS — Z79.899 HIGH RISK MEDICATION USE: ICD-10-CM

## 2023-02-20 DIAGNOSIS — F41.1 GAD (GENERALIZED ANXIETY DISORDER): Primary | ICD-10-CM

## 2023-02-20 PROCEDURE — 99213 OFFICE O/P EST LOW 20 MIN: CPT | Performed by: PHYSICIAN ASSISTANT

## 2023-02-20 RX ORDER — MONTELUKAST SODIUM 10 MG/1
10 TABLET ORAL DAILY
COMMUNITY
Start: 2023-01-12

## 2023-02-20 NOTE — PROGRESS NOTES
I, Dr. David Naylor, have reviewed the notes, assessments, and/or procedures performed by Miri IBARRA, that occurred within our practice at [facility] location, I concur with her documentation of Ni Rodríguez. I have a current collaborative medical agreement with Miri IBARRA.

## 2023-02-20 NOTE — PROGRESS NOTES
"Chief Complaint  Anxiety    Subjective          Ni Rodríguez presents to [facility]  History of Present Illness  Ni is a 42-year-old female who presents for anxiety management.  She has gained 1 pounds since January 23, 2023 office visit.  States she has not had to take the Xanax twice in the past month.  States it has helped with her panic anxiety.  States overall she is feeling better.  She has started to walk more for exercise.  Her appetite and dieting has improved as well.  Sleep has been normal.  Denied any suicidal homicidal ideation.  Ni is currently seeing a therapist twice monthly which has helped.  She has no complaints today.  Review of Systems   Constitutional: Negative.    HENT: Negative.    Eyes: Negative.    Respiratory: Negative.    Cardiovascular: Negative.    Gastrointestinal: Negative.    Endocrine: Negative.    Genitourinary: Negative.    Musculoskeletal: Negative.    Skin: Negative.    Allergic/Immunologic: Negative.    Neurological: Negative.    Hematological: Negative.    Psychiatric/Behavioral: Negative.  Negative for sleep disturbance and suicidal ideas.   All other systems reviewed and are negative.    Objective   Vital Signs:   /80 (BP Location: Right arm, Patient Position: Sitting, Cuff Size: Adult)   Pulse 98   Temp 97.6 °F (36.4 °C)   Resp 16   Ht 165.1 cm (65\")   Wt 97.1 kg (214 lb)   SpO2 99%   BMI 35.61 kg/m²     Physical Exam  Vitals and nursing note reviewed. Exam conducted with a chaperone present.   Constitutional:       Appearance: Normal appearance. She is well-developed and well-groomed. She is obese.      Interventions: Face mask in place.   HENT:      Head: Normocephalic and atraumatic.      Jaw: There is normal jaw occlusion.      Right Ear: Hearing, tympanic membrane, ear canal and external ear normal.      Left Ear: Hearing, tympanic membrane, ear canal and external ear normal.      Nose: Nose normal.      Right Sinus: " No maxillary sinus tenderness or frontal sinus tenderness.      Left Sinus: No maxillary sinus tenderness or frontal sinus tenderness.      Mouth/Throat:      Lips: Pink.      Mouth: Mucous membranes are moist.      Tongue: No lesions.      Palate: No mass.      Pharynx: Oropharynx is clear. Uvula midline.   Eyes:      General: Lids are normal.      Conjunctiva/sclera: Conjunctivae normal.      Pupils: Pupils are equal, round, and reactive to light.   Neck:      Thyroid: No thyroid mass, thyromegaly or thyroid tenderness.      Trachea: Trachea and phonation normal. No tracheal tenderness.   Cardiovascular:      Rate and Rhythm: Normal rate and regular rhythm.      Pulses: Normal pulses.      Heart sounds: Normal heart sounds, S1 normal and S2 normal. No murmur heard.  Pulmonary:      Effort: Pulmonary effort is normal.      Breath sounds: Normal breath sounds and air entry.   Chest:   Breasts:     Breasts are symmetrical.      Right: Normal. No swelling, bleeding, inverted nipple, mass, nipple discharge, skin change or tenderness.      Left: Normal. No swelling, bleeding, inverted nipple, mass, nipple discharge, skin change or tenderness.   Abdominal:      General: Bowel sounds are normal.      Palpations: Abdomen is soft. There is no hepatomegaly.      Tenderness: There is no abdominal tenderness. There is no right CVA tenderness, left CVA tenderness, guarding or rebound. Negative signs include Berg's sign, Rovsing's sign, McBurney's sign, psoas sign and obturator sign.      Hernia: There is no hernia in the left inguinal area or right inguinal area.   Genitourinary:     General: Normal vulva.      Exam position: Supine.      Labia:         Right: No rash, tenderness, lesion or injury.         Left: No rash, tenderness, lesion or injury.       Vagina: Normal.      Cervix: Normal.      Uterus: Normal.       Adnexa: Right adnexa normal and left adnexa normal.        Right: No mass, tenderness or fullness.           Left: No mass, tenderness or fullness.        Rectum: Normal. Guaiac result negative. No mass, tenderness, anal fissure, external hemorrhoid or internal hemorrhoid. Normal anal tone.   Musculoskeletal:      Cervical back: Neck supple. No edema.      Right lower leg: No edema.      Left lower leg: No edema.   Lymphadenopathy:      Cervical: No cervical adenopathy.      Right cervical: No superficial, deep or posterior cervical adenopathy.     Left cervical: No superficial, deep or posterior cervical adenopathy.      Upper Body:      Right upper body: No axillary adenopathy.      Left upper body: No axillary adenopathy.      Lower Body: No right inguinal adenopathy. No left inguinal adenopathy.   Skin:     General: Skin is warm and dry.      Capillary Refill: Capillary refill takes less than 2 seconds.   Neurological:      Mental Status: She is alert and oriented to person, place, and time.      Deep Tendon Reflexes: Reflexes are normal and symmetric.   Psychiatric:         Attention and Perception: Attention and perception normal.         Mood and Affect: Mood and affect normal.         Speech: Speech normal.         Behavior: Behavior normal. Behavior is cooperative.         Thought Content: Thought content normal.         Cognition and Memory: Cognition and memory normal.         Judgment: Judgment normal.     I wore a facial mask during this patient encounter.  Patient also wearing a surgical mask.  Hand hygiene performed before and after seeing the patient.     Result Review :                 Assessment and Plan    Diagnoses and all orders for this visit:    1. HERBERT (generalized anxiety disorder) (Primary)  -     Compliance Drug Analysis, Ur - Urine, Clean Catch    2. High risk medication use  -     Compliance Drug Analysis, Ur - Urine, Clean Catch    Ni was seen in office today for chronic and stable generalized anxiety disorder.  Doing well with the Xanax medication.  She will have a urine drug screen  collected today for compliance purposes.  Has only had to take 2 tablets in the past month.  Last dose was 2 weeks ago.  She has signed the appropriate agreement and consent forms for the medication.  No refills are needed.  Will return to office in June for annual physical exam with fasting labs.    Follow Up   Return in about 4 months (around 6/20/2023), or labs prior, for Annual.  Patient was given instructions and counseling regarding her condition or for health maintenance advice. Please see specific information pulled into the AVS if appropriate.     BELINDA Munoz [facility]  [address] [place] [id number]  Dept: [phone]  Dept Fax: [phone]  Loc: [phone]  Loc Fax: [phone]

## 2023-02-25 LAB — DRUGS UR: NORMAL

## 2023-03-30 ENCOUNTER — OFFICE VISIT (OUTPATIENT)
Dept: CARDIOLOGY | Facility: CLINIC | Age: 43
End: 2023-03-30
Payer: COMMERCIAL

## 2023-03-30 ENCOUNTER — LAB (OUTPATIENT)
Dept: LAB | Facility: HOSPITAL | Age: 43
End: 2023-03-30
Payer: COMMERCIAL

## 2023-03-30 VITALS
WEIGHT: 214 LBS | HEIGHT: 65 IN | DIASTOLIC BLOOD PRESSURE: 76 MMHG | BODY MASS INDEX: 35.65 KG/M2 | SYSTOLIC BLOOD PRESSURE: 110 MMHG | OXYGEN SATURATION: 98 % | HEART RATE: 92 BPM

## 2023-03-30 DIAGNOSIS — U09.9 POST-COVID SYNDROME: Primary | ICD-10-CM

## 2023-03-30 DIAGNOSIS — R06.09 DOE (DYSPNEA ON EXERTION): ICD-10-CM

## 2023-03-30 DIAGNOSIS — R53.83 FATIGUE, UNSPECIFIED TYPE: ICD-10-CM

## 2023-03-30 LAB
25(OH)D3 SERPL-MCNC: 71.5 NG/ML (ref 30–100)
ALBUMIN SERPL-MCNC: 3.9 G/DL (ref 3.5–5.2)
ALBUMIN/GLOB SERPL: 1.1 G/DL
ALP SERPL-CCNC: 69 U/L (ref 39–117)
ALT SERPL W P-5'-P-CCNC: 18 U/L (ref 1–33)
ANION GAP SERPL CALCULATED.3IONS-SCNC: 9 MMOL/L (ref 5–15)
AST SERPL-CCNC: 14 U/L (ref 1–32)
BILIRUB SERPL-MCNC: 0.2 MG/DL (ref 0–1.2)
BUN SERPL-MCNC: 14 MG/DL (ref 6–20)
BUN/CREAT SERPL: 20 (ref 7–25)
CALCIUM SPEC-SCNC: 9.1 MG/DL (ref 8.6–10.5)
CHLORIDE SERPL-SCNC: 104 MMOL/L (ref 98–107)
CO2 SERPL-SCNC: 26 MMOL/L (ref 22–29)
CREAT SERPL-MCNC: 0.7 MG/DL (ref 0.57–1)
DEPRECATED RDW RBC AUTO: 37.7 FL (ref 37–54)
EGFRCR SERPLBLD CKD-EPI 2021: 110.2 ML/MIN/1.73
ERYTHROCYTE [DISTWIDTH] IN BLOOD BY AUTOMATED COUNT: 12.5 % (ref 12.3–15.4)
GLOBULIN UR ELPH-MCNC: 3.4 GM/DL
GLUCOSE SERPL-MCNC: 95 MG/DL (ref 65–99)
HBA1C MFR BLD: 5.2 % (ref 4.8–5.6)
HCT VFR BLD AUTO: 41.3 % (ref 34–46.6)
HGB BLD-MCNC: 14 G/DL (ref 12–15.9)
MCH RBC QN AUTO: 28.2 PG (ref 26.6–33)
MCHC RBC AUTO-ENTMCNC: 33.9 G/DL (ref 31.5–35.7)
MCV RBC AUTO: 83.1 FL (ref 79–97)
PLATELET # BLD AUTO: 293 10*3/MM3 (ref 140–450)
PMV BLD AUTO: 9.9 FL (ref 6–12)
POTASSIUM SERPL-SCNC: 3.9 MMOL/L (ref 3.5–5.2)
PROT SERPL-MCNC: 7.3 G/DL (ref 6–8.5)
RBC # BLD AUTO: 4.97 10*6/MM3 (ref 3.77–5.28)
SODIUM SERPL-SCNC: 139 MMOL/L (ref 136–145)
T-UPTAKE NFR SERPL: 1.2 TBI (ref 0.8–1.3)
T3FREE SERPL-MCNC: 2.87 PG/ML (ref 2–4.4)
T4 FREE SERPL-MCNC: 1.18 NG/DL (ref 0.93–1.7)
T4 SERPL-MCNC: 9.46 MCG/DL (ref 4.5–11.7)
TSH SERPL DL<=0.05 MIU/L-ACNC: 2.82 UIU/ML (ref 0.27–4.2)
VIT B12 BLD-MCNC: 444 PG/ML (ref 211–946)
WBC NRBC COR # BLD: 10.83 10*3/MM3 (ref 3.4–10.8)

## 2023-03-30 PROCEDURE — 36415 COLL VENOUS BLD VENIPUNCTURE: CPT

## 2023-03-30 PROCEDURE — 80050 GENERAL HEALTH PANEL: CPT

## 2023-03-30 PROCEDURE — 82607 VITAMIN B-12: CPT

## 2023-03-30 PROCEDURE — 83036 HEMOGLOBIN GLYCOSYLATED A1C: CPT

## 2023-03-30 PROCEDURE — 82306 VITAMIN D 25 HYDROXY: CPT

## 2023-03-30 PROCEDURE — 84439 ASSAY OF FREE THYROXINE: CPT

## 2023-03-30 PROCEDURE — 84481 FREE ASSAY (FT-3): CPT

## 2023-03-30 PROCEDURE — 84479 ASSAY OF THYROID (T3 OR T4): CPT

## 2023-03-30 RX ORDER — FLUTICASONE PROPIONATE AND SALMETEROL XINAFOATE 115; 21 UG/1; UG/1
AEROSOL, METERED RESPIRATORY (INHALATION)
COMMUNITY
Start: 2023-02-21

## 2023-03-30 NOTE — PROGRESS NOTES
Date of Office Visit: 2023  Encounter Provider: BROOK Payton  Place of Service: [facility] CARDIOLOGY  Patient Name: Ni Rodríguez  :1980    Chief Complaint   Patient presents with   • Follow-up   • Palpitations   :     HPI: She was seen in February by Dr. Walter and had had COVID 3 weeks prior.  She is having little dyspnea with some fevers chills and sweats.  She had a little discomfort in her left chest under the left breast radiating to the back.  I saw her in November for follow-up overall she was doing well she is an  she had started having some palpitations and her Fitbit said that her heart rate was 150 and she was short of breath.  She was also having some chest pain so I went ahead and did a stress test.  It was negative for ischemia.  Her Holter monitor showed some very rare PVCs and some SVT with 4 beats at a time.  There were no sustainable arrhythmias average heart rate was 96 with lowest heart rate of 72.  When I called her about her heart monitor on  she was said she was still having episodes where she could not catch her breath and her heart rhythm was in a sinus tach.  I put her on a low-dose of beta-blocker to see if this would help.    She did not start the metoprolol she was afraid of starting a new medication.  Then a few days ago she had an episode where her heart rate was in the 120s to 140s all day.  So she started taking it yesterday.  It has been a little better today.  She is also been profoundly tired and is not sleeping well.  She wears her Apple Watch at night and there is some issues with it monitoring her sleep.  She does not have any energy and is having trouble even getting an exercise.  She has been trying to eat better.     Previous testing and notes have been reviewed by me.   Past Medical History:   Diagnosis Date   • Bright red blood per rectum    • COVID    • Disease of thyroid gland    •  Environmental allergies    • History of prior pregnancies      3   • Miscarriage     X 1   • URI (upper respiratory infection)        History reviewed. No pertinent surgical history.    Social History     Socioeconomic History   • Marital status:    • Number of children: 2   • Years of education: masters in education   Tobacco Use   • Smoking status: Never   • Smokeless tobacco: Never   Vaping Use   • Vaping Use: Never used   Substance and Sexual Activity   • Alcohol use: Yes     Alcohol/week: 1.0 standard drink     Types: 1 Glasses of wine per week   • Drug use: No   • Sexual activity: Yes     Partners: Male     Birth control/protection: OCP       Family History   Problem Relation Age of Onset   • Arthritis Mother         Osteoarthritis   • Thyroid disease Mother    • Hypertension Father    • Pancreatitis Father         GB complication -  removed   • Hyperlipidemia Father    • Diabetes Father    • Other Sister         Hypoglycemia   • Hypothyroidism Brother    • Uterine cancer Maternal Grandmother    • Alzheimer's disease Maternal Grandfather    • Prostate cancer Maternal Grandfather    • Arthritis Paternal Grandmother         Osteoarthritis   • Prostate cancer Paternal Grandfather        ROS    Allergies   Allergen Reactions   • Bee Venom Hives, Itching and Other (See Comments)     Localized reaction redness   • Wasp Venom Protein Hives and Itching     Localized reaction redness         Current Outpatient Medications:   •  Advair -21 MCG/ACT inhaler, INHALE 2 PUFFS INTO THE LUNGS TWICE A DAY RINSE MOUTH WITH WATER AFTER USE, DO NOT SWALLOW., Disp: , Rfl:   •  albuterol sulfate  (90 Base) MCG/ACT inhaler, INHALE 2 PUFFS EVERY 4 HOURS AS NEEDED FOR WHEEZING OR SHORTNESS OF AIR, Disp: 18 g, Rfl: 3  •  ALPRAZolam (Xanax) 0.5 MG tablet, Take 0.5 to one tablet twice a day as needed for anxiety, Disp: 30 tablet, Rfl: 0  •  azelastine (ASTELIN) 0.1 % nasal spray, , Disp: , Rfl:   •   "clindamycin (CLINDAGEL) 1 % gel, APPLY TO AFFECTED AREAS IN SCALP DAILY, Disp: , Rfl:   •  CVS Cortisone Maximum Strength 1 % ointment, APPLY TOPICALLY TO THE APPROPRIATE AREA AS DIRECTED 2 (TWO) TIMES A DAY., Disp: 56 g, Rfl: 0  •  diclofenac (VOLTAREN) 75 MG EC tablet, Take 1 tablet by mouth 2 (Two) Times a Day., Disp: , Rfl:   •  drospirenone-ethinyl estradiol (MATT,OCELLA) 3-0.03 MG per tablet, Take 1 tablet by mouth Daily., Disp: 84 tablet, Rfl: 3  •  Erenumab-aooe (Aimovig) 140 MG/ML prefilled syringe, Inject 1 mL under the skin into the appropriate area as directed., Disp: , Rfl:   •  fexofenadine (ALLEGRA) 180 MG tablet, Take 1 tablet by mouth Daily., Disp: , Rfl:   •  FLUoxetine (PROzac) 40 MG capsule, Take 1 capsule by mouth Daily., Disp: , Rfl:   •  ketoconazole (NIZORAL) 2 % shampoo, SHAMPOO SCALP EVERY OTHER DAY, Disp: , Rfl:   •  montelukast (SINGULAIR) 10 MG tablet, Take 1 tablet by mouth Daily., Disp: , Rfl:   •  mupirocin (BACTROBAN) 2 % ointment, APPLY TOPICALLY TO THE APPROPRIATE AREA AS DIRECTED 3 (THREE) TIMES A DAY., Disp: 22 g, Rfl: 0  •  OnabotulinumtoxinA 200 units reconstituted solution, Inject 200 Units into the appropriate muscle as directed by prescriber., Disp: , Rfl:   •  Rimegepant Sulfate (NURTEC) 75 MG tablet dispersible tablet, Take 1 tablet by mouth Every Other Day., Disp: , Rfl:   •  Synthroid 50 MCG tablet, TAKE 1 TABLET BY MOUTH EVERY DAY, Disp: 90 tablet, Rfl: 3  •  metoprolol tartrate (LOPRESSOR) 25 MG tablet, Take 0.5 tablets by mouth 2 (Two) Times a Day., Disp: 60 tablet, Rfl: 11      Objective:     Vitals:    03/30/23 1409   BP: 110/76   Pulse: 92   SpO2: 98%   Weight: 97.1 kg (214 lb)   Height: 165.1 cm (65\")     Body mass index is 35.61 kg/m².    PHYSICAL EXAM:    Constitutional:       General: Not in acute distress.     Appearance: Normal appearance. Well-developed.   Eyes:      Pupils: Pupils are equal, round, and reactive to light.   HENT:      Head: Normocephalic. "   Neck:      Vascular: No carotid bruit or JVD.   Pulmonary:      Effort: Pulmonary effort is normal. No tachypnea.      Breath sounds: Normal breath sounds. No wheezing. No rales.   Cardiovascular:      Normal rate. Regular rhythm.      No gallop.   Pulses:     Intact distal pulses.   Edema:     Peripheral edema absent.   Abdominal:      General: Bowel sounds are normal.      Palpations: Abdomen is soft.      Tenderness: There is no abdominal tenderness.   Musculoskeletal: Normal range of motion.      Cervical back: Normal range of motion and neck supple. No edema. Skin:     General: Skin is warm and dry.   Neurological:      Mental Status: Alert and oriented to person, place, and time.           ECG 12 Lead    Date/Time: 3/30/2023 3:19 PM  Performed by: Kayla Mcgee APRN  Authorized by: Kayla Mcgee APRN   Comparison: compared with previous ECG from 12/20/2022  Similar to previous ECG  Rhythm: sinus rhythm  Rate: normal  QRS axis: normal    Clinical impression: normal ECG              Assessment:       Diagnosis Plan   1. Post-COVID syndrome        2. WORLEY (dyspnea on exertion)  Ambulatory Referral to Sleep Medicine    Thyroid Panel With TSH    T4, Free    T3, Free    Vitamin D 25 Hydroxy    Vitamin B12    CBC (No Diff)    Comprehensive Metabolic Panel    Hemoglobin A1c      3. Fatigue, unspecified type  Thyroid Panel With TSH    T4, Free    T3, Free    Vitamin D 25 Hydroxy    Vitamin B12    CBC (No Diff)    Comprehensive Metabolic Panel    Hemoglobin A1c        Orders Placed This Encounter   Procedures   • Thyroid Panel With TSH     Standing Status:   Future     Number of Occurrences:   1     Standing Expiration Date:   3/30/2024     Order Specific Question:   Release to patient     Answer:   Routine Release   • T4, Free     Standing Status:   Future     Number of Occurrences:   1     Standing Expiration Date:   3/30/2024     Order Specific Question:   Release to patient     Answer:   Routine Release    • T3, Free     Standing Status:   Future     Number of Occurrences:   1     Standing Expiration Date:   3/30/2024     Order Specific Question:   Release to patient     Answer:   Routine Release   • Vitamin D 25 Hydroxy     Standing Status:   Future     Number of Occurrences:   1     Standing Expiration Date:   3/30/2024     Order Specific Question:   Release to patient     Answer:   Routine Release   • Vitamin B12     Standing Status:   Future     Number of Occurrences:   1     Standing Expiration Date:   3/30/2024     Order Specific Question:   Release to patient     Answer:   Routine Release   • CBC (No Diff)     Standing Status:   Future     Number of Occurrences:   1     Standing Expiration Date:   3/30/2024     Order Specific Question:   Release to patient     Answer:   Routine Release   • Comprehensive Metabolic Panel     Standing Status:   Future     Number of Occurrences:   1     Standing Expiration Date:   3/30/2024     Order Specific Question:   Release to patient     Answer:   Routine Release   • Hemoglobin A1c     Standing Status:   Future     Number of Occurrences:   1     Standing Expiration Date:   3/30/2024     Order Specific Question:   Release to patient     Answer:   Routine Release   • Ambulatory Referral to Sleep Medicine     Referral Priority:   Routine     Referral Type:   Consultation     Referral Reason:   Specialty Services Required     Referred to Provider:   Josh Joshi MD     Requested Specialty:   Sleep Medicine     Number of Visits Requested:   1          Plan:       She is going to continue to take the metoprolol 12.5 mg twice a day this seems to be helping in the last day or 2.  I am luz marina send her for some labs.  I think she also could have sleep apnea which be could be contributing to her symptoms of daytime breathlessness and fatigue.  She does not sleep well at night.  I have also encouraged her to try to get in some regular exercise.  I will call with results and she  can follow-up with me in 3 months.         Your medication list          Accurate as of March 30, 2023  3:17 PM. If you have any questions, ask your nurse or doctor.            START taking these medications      Instructions Last Dose Given Next Dose Due   metoprolol tartrate 25 MG tablet  Commonly known as: LOPRESSOR  Started by: BROOK Payton      Take 0.5 tablets by mouth 2 (Two) Times a Day.          CONTINUE taking these medications      Instructions Last Dose Given Next Dose Due   Advair -21 MCG/ACT inhaler  Generic drug: fluticasone-salmeterol      INHALE 2 PUFFS INTO THE LUNGS TWICE A DAY RINSE MOUTH WITH WATER AFTER USE, DO NOT SWALLOW.       Aimovig 140 MG/ML auto-injector  Generic drug: Erenumab-aooe      Inject 1 mL under the skin into the appropriate area as directed.       albuterol sulfate  (90 Base) MCG/ACT inhaler  Commonly known as: PROVENTIL HFA;VENTOLIN HFA;PROAIR HFA      INHALE 2 PUFFS EVERY 4 HOURS AS NEEDED FOR WHEEZING OR SHORTNESS OF AIR       ALPRAZolam 0.5 MG tablet  Commonly known as: Xanax      Take 0.5 to one tablet twice a day as needed for anxiety       azelastine 0.1 % nasal spray  Commonly known as: ASTELIN           clindamycin 1 % gel  Commonly known as: CLINDAGEL      APPLY TO AFFECTED AREAS IN SCALP DAILY       CVS Cortisone Maximum Strength 1 % ointment  Generic drug: hydrocortisone      APPLY TOPICALLY TO THE APPROPRIATE AREA AS DIRECTED 2 (TWO) TIMES A DAY.       diclofenac 75 MG EC tablet  Commonly known as: VOLTAREN      Take 1 tablet by mouth 2 (Two) Times a Day.       drospirenone-ethinyl estradiol 3-0.03 MG per tablet  Commonly known as: MATT,OCELLA      Take 1 tablet by mouth Daily.       fexofenadine 180 MG tablet  Commonly known as: ALLEGRA      Take 1 tablet by mouth Daily.       FLUoxetine 40 MG capsule  Commonly known as: PROzac      Take 1 capsule by mouth Daily.       ketoconazole 2 % shampoo  Commonly known as: NIZORAL      SHAMPOO  SCALP EVERY OTHER DAY       montelukast 10 MG tablet  Commonly known as: SINGULAIR      Take 1 tablet by mouth Daily.       mupirocin 2 % ointment  Commonly known as: BACTROBAN      APPLY TOPICALLY TO THE APPROPRIATE AREA AS DIRECTED 3 (THREE) TIMES A DAY.       OnabotulinumtoxinA 200 units reconstituted solution      Inject 200 Units into the appropriate muscle as directed by prescriber.       Rimegepant Sulfate 75 MG tablet dispersible tablet  Commonly known as: NURTEC      Take 1 tablet by mouth Every Other Day.       Synthroid 50 MCG tablet  Generic drug: levothyroxine      TAKE 1 TABLET BY MOUTH EVERY DAY             Where to Get Your Medications      These medications were sent to [facility]/pharmacy #[id number] - [place], [place] - [address] [address] AT [address]SECTION OF [place] [address] - [phone] [facility] [phone]   [address] [address]    Phone: [phone]   · metoprolol tartrate 25 MG tablet           As always, it has been a pleasure to participate in your patient's care.      Sincerely,     Kayla DOE

## 2023-05-01 ENCOUNTER — TELEPHONE (OUTPATIENT)
Dept: CARDIOLOGY | Facility: CLINIC | Age: 43
End: 2023-05-01
Payer: COMMERCIAL

## 2023-05-02 ENCOUNTER — TELEPHONE (OUTPATIENT)
Dept: CARDIOLOGY | Facility: CLINIC | Age: 43
End: 2023-05-02
Payer: COMMERCIAL

## 2023-05-02 DIAGNOSIS — R00.0 TACHYCARDIA: Primary | ICD-10-CM

## 2023-05-02 NOTE — TELEPHONE ENCOUNTER
Spoke to patient about chest pain symptoms.  She has a pain that is constant substernal burning does not feel like indigestion she has had before its not worse with any specific activity.  It does hurt if she presses in on her chest.  This sounds noncardiac so almost sounds like some costochondritis.  I asked her to take some ibuprofen 400 mg twice a day for 3 days.  She also had a heart rate of 176 the other day I am luz marina have her wear a Zio patch we did a Holter in December but did not really show  Anything significant.  I told her she can also take an extra metoprolol if she needs to.

## 2023-05-26 ENCOUNTER — TELEPHONE (OUTPATIENT)
Dept: CARDIOLOGY | Facility: CLINIC | Age: 43
End: 2023-05-26
Payer: COMMERCIAL

## 2023-05-26 DIAGNOSIS — Z00.00 ANNUAL PHYSICAL EXAM: Primary | ICD-10-CM

## 2023-05-26 DIAGNOSIS — E03.9 ACQUIRED HYPOTHYROIDISM: ICD-10-CM

## 2023-05-26 DIAGNOSIS — Z00.00 HEALTHCARE MAINTENANCE: ICD-10-CM

## 2023-05-26 DIAGNOSIS — Z13.220 SCREENING FOR LIPID DISORDERS: ICD-10-CM

## 2023-05-26 NOTE — TELEPHONE ENCOUNTER
Left patient a message about heart monitor results.  No arrhythmias occasional PACs good heart rate control.  No changes at this time.   straight cathed pt using sterile technique with 2 RNs at bedside. Pt tolerated well. Approx 200 cc output noted. Will continue to monitor.

## 2023-05-31 DIAGNOSIS — G93.32 COVID-19 LONG HAULER MANIFESTING CHRONIC FATIGUE: ICD-10-CM

## 2023-05-31 DIAGNOSIS — R00.2 HEART PALPITATIONS: ICD-10-CM

## 2023-05-31 DIAGNOSIS — R53.82 CHRONIC FATIGUE: ICD-10-CM

## 2023-05-31 DIAGNOSIS — U09.9 POST-COVID SYNDROME: Primary | ICD-10-CM

## 2023-05-31 DIAGNOSIS — U09.9 COVID-19 LONG HAULER MANIFESTING CHRONIC FATIGUE: ICD-10-CM

## 2023-06-01 LAB
ALBUMIN SERPL-MCNC: 3.8 G/DL (ref 3.5–5.2)
ALBUMIN/GLOB SERPL: 1.3 G/DL
ALP SERPL-CCNC: 60 U/L (ref 39–117)
ALT SERPL-CCNC: 16 U/L (ref 1–33)
AST SERPL-CCNC: 11 U/L (ref 1–32)
BASOPHILS # BLD AUTO: 0.03 10*3/MM3 (ref 0–0.2)
BASOPHILS NFR BLD AUTO: 0.3 % (ref 0–1.5)
BILIRUB SERPL-MCNC: 0.4 MG/DL (ref 0–1.2)
BUN SERPL-MCNC: 11 MG/DL (ref 6–20)
BUN/CREAT SERPL: 13.6 (ref 7–25)
CALCIUM SERPL-MCNC: 9.4 MG/DL (ref 8.6–10.5)
CHLORIDE SERPL-SCNC: 106 MMOL/L (ref 98–107)
CHOLEST SERPL-MCNC: 169 MG/DL (ref 0–200)
CO2 SERPL-SCNC: 21.6 MMOL/L (ref 22–29)
CREAT SERPL-MCNC: 0.81 MG/DL (ref 0.57–1)
EGFRCR SERPLBLD CKD-EPI 2021: 92.5 ML/MIN/1.73
EOSINOPHIL # BLD AUTO: 0.18 10*3/MM3 (ref 0–0.4)
EOSINOPHIL NFR BLD AUTO: 1.9 % (ref 0.3–6.2)
ERYTHROCYTE [DISTWIDTH] IN BLOOD BY AUTOMATED COUNT: 13.1 % (ref 12.3–15.4)
FT4I SERPL CALC-MCNC: 1.9 (ref 1.2–4.9)
GLOBULIN SER CALC-MCNC: 2.9 GM/DL
GLUCOSE SERPL-MCNC: 81 MG/DL (ref 65–99)
HCT VFR BLD AUTO: 41.5 % (ref 34–46.6)
HDLC SERPL-MCNC: 71 MG/DL (ref 40–60)
HGB BLD-MCNC: 13.8 G/DL (ref 12–15.9)
IMM GRANULOCYTES # BLD AUTO: 0.05 10*3/MM3 (ref 0–0.05)
IMM GRANULOCYTES NFR BLD AUTO: 0.5 % (ref 0–0.5)
LDLC SERPL CALC-MCNC: 79 MG/DL (ref 0–100)
LDLC/HDLC SERPL: 1.07 {RATIO}
LYMPHOCYTES # BLD AUTO: 2.73 10*3/MM3 (ref 0.7–3.1)
LYMPHOCYTES NFR BLD AUTO: 28.1 % (ref 19.6–45.3)
MCH RBC QN AUTO: 28.1 PG (ref 26.6–33)
MCHC RBC AUTO-ENTMCNC: 33.3 G/DL (ref 31.5–35.7)
MCV RBC AUTO: 84.5 FL (ref 79–97)
MONOCYTES # BLD AUTO: 0.85 10*3/MM3 (ref 0.1–0.9)
MONOCYTES NFR BLD AUTO: 8.8 % (ref 5–12)
NEUTROPHILS # BLD AUTO: 5.86 10*3/MM3 (ref 1.7–7)
NEUTROPHILS NFR BLD AUTO: 60.4 % (ref 42.7–76)
NRBC BLD AUTO-RTO: 0 /100 WBC (ref 0–0.2)
PLATELET # BLD AUTO: 269 10*3/MM3 (ref 140–450)
POTASSIUM SERPL-SCNC: 4.5 MMOL/L (ref 3.5–5.2)
PROT SERPL-MCNC: 6.7 G/DL (ref 6–8.5)
RBC # BLD AUTO: 4.91 10*6/MM3 (ref 3.77–5.28)
SODIUM SERPL-SCNC: 140 MMOL/L (ref 136–145)
T3RU NFR SERPL: 20 % (ref 24–39)
T4 SERPL-MCNC: 9.3 UG/DL (ref 4.5–12)
TRIGL SERPL-MCNC: 109 MG/DL (ref 0–150)
TSH SERPL DL<=0.005 MIU/L-ACNC: 3.67 UIU/ML (ref 0.45–4.5)
VLDLC SERPL CALC-MCNC: 19 MG/DL (ref 5–40)
WBC # BLD AUTO: 9.7 10*3/MM3 (ref 3.4–10.8)

## 2023-06-02 DIAGNOSIS — E03.9 ACQUIRED HYPOTHYROIDISM: Primary | ICD-10-CM

## 2023-06-02 RX ORDER — LEVOTHYROXINE SODIUM 75 MCG
75 TABLET ORAL DAILY
Qty: 30 TABLET | Refills: 3 | Status: SHIPPED | OUTPATIENT
Start: 2023-06-02

## 2023-06-05 LAB
APO B SERPL-MCNC: 71 MG/DL
CORTIS AM PEAK SERPL-MCNC: 23.9 UG/DL (ref 6.2–19.4)
CRP SERPL HS-MCNC: 6.47 MG/L (ref 0–3)
DHEA SERPL-MCNC: 212 NG/DL (ref 31–701)
FOLATE SERPL-MCNC: 19.6 NG/ML
HCYS SERPL-SCNC: 6.6 UMOL/L (ref 0–14.5)
INSULIN SERPL-ACNC: 9.7 UIU/ML
SHBG SERPL-SCNC: 278 NMOL/L (ref 24.6–122)
VIT B12 SERPL-MCNC: 381 PG/ML (ref 232–1245)

## 2023-06-07 PROBLEM — Z23 NEED FOR INFLUENZA VACCINATION: Status: RESOLVED | Noted: 2019-01-02 | Resolved: 2023-06-07

## 2023-06-07 PROBLEM — J20.9 ACUTE BRONCHITIS: Status: RESOLVED | Noted: 2019-03-29 | Resolved: 2023-06-07

## 2023-06-08 ENCOUNTER — OFFICE VISIT (OUTPATIENT)
Dept: FAMILY MEDICINE CLINIC | Facility: CLINIC | Age: 43
End: 2023-06-08
Payer: COMMERCIAL

## 2023-06-08 VITALS
OXYGEN SATURATION: 99 % | HEART RATE: 69 BPM | RESPIRATION RATE: 16 BRPM | WEIGHT: 214 LBS | DIASTOLIC BLOOD PRESSURE: 72 MMHG | HEIGHT: 65 IN | SYSTOLIC BLOOD PRESSURE: 118 MMHG | BODY MASS INDEX: 35.65 KG/M2 | TEMPERATURE: 97.8 F

## 2023-06-08 DIAGNOSIS — E03.9 ACQUIRED HYPOTHYROIDISM: ICD-10-CM

## 2023-06-08 DIAGNOSIS — Z00.00 ANNUAL PHYSICAL EXAM: Primary | ICD-10-CM

## 2023-06-08 DIAGNOSIS — F41.1 GAD (GENERALIZED ANXIETY DISORDER): ICD-10-CM

## 2023-06-08 DIAGNOSIS — U09.9 POST-COVID SYNDROME: ICD-10-CM

## 2023-06-08 DIAGNOSIS — H69.83 EUSTACHIAN TUBE DYSFUNCTION, BILATERAL: ICD-10-CM

## 2023-06-08 PROBLEM — H69.91 DYSFUNCTION OF RIGHT EUSTACHIAN TUBE: Status: RESOLVED | Noted: 2020-04-29 | Resolved: 2023-06-08

## 2023-06-08 PROBLEM — J06.9 ACUTE URI: Status: RESOLVED | Noted: 2019-03-29 | Resolved: 2023-06-08

## 2023-06-08 PROBLEM — H66.90 OTITIS MEDIA: Status: RESOLVED | Noted: 2020-04-29 | Resolved: 2023-06-08

## 2023-06-08 PROBLEM — H69.81 DYSFUNCTION OF RIGHT EUSTACHIAN TUBE: Status: RESOLVED | Noted: 2020-04-29 | Resolved: 2023-06-08

## 2023-06-08 PROCEDURE — 99396 PREV VISIT EST AGE 40-64: CPT | Performed by: PHYSICIAN ASSISTANT

## 2023-06-08 RX ORDER — METHYLPREDNISOLONE 4 MG/1
TABLET ORAL
Qty: 21 TABLET | Refills: 0 | Status: SHIPPED | OUTPATIENT
Start: 2023-06-08

## 2023-06-08 NOTE — PROGRESS NOTES
"Chief Complaint  Annual Exam, Anxiety (management), and Hypothyroidism (management)    Subjective          Ni Rodríguez presents to [facility] PRIMARY CARE  History of Present Illness  Ni is a 42 year old female who presents annual physical exam, hypothyroidism, anxiety and post-COVID syndrome.  Ni states she has an appointment with the [facility] next month.  States she has been battling COVID for the past year or so.  States she has been exercising more.  Recently traveled to [place] with  and did well.  States she was able to walk and was not as tired.  He has recently seen her pulmonologist who recommended her staying on the Advair.  She battles with the shortness of breath.  Also seeing cardiologist for her fluctuating heart rate issues.  Currently on the Lopressor twice daily.  States this seems to have helped.  Takes the Xanax periodically for anxiety issues.  She has been doing well with the Prozac medication.  Any suicidal or homicidal ideation.  Bowel movement have been normal without dark black tarry stools.  Diet has been healthier.  States she is trying to eat .  Sleep has been restless at times.  States she has noticed some hand and feet feeling asleep at times.  States this is a new symptom of her post-COVID syndrome.  She has been exercising more by riding a bike.  Ni states she has been having some right ear pain for the past several days.  Denied any fevers, chills, ear drainage, sinus pressure, rhinorrhea, sore throat, postnasal drip, cough, wheezing, abdominal pain, GI upset, or swelling of ankles.     Objective   Vital Signs:   /72 (BP Location: Left arm, Patient Position: Sitting, Cuff Size: Adult)   Pulse 69   Temp 97.8 °F (36.6 °C)   Resp 16   Ht 165.1 cm (65\")   Wt 97.1 kg (214 lb)   SpO2 99%   BMI 35.61 kg/m²     Patient's last menstrual period was 05/09/2023 (approximate).    Physical Exam  Vitals and nursing note reviewed. "   Constitutional:       Appearance: Normal appearance. She is well-developed and well-groomed. She is obese.   HENT:      Head: Normocephalic and atraumatic.      Jaw: There is normal jaw occlusion.      Right Ear: Hearing, tympanic membrane, ear canal and external ear normal. Tympanic membrane is bulging. Tympanic membrane has decreased mobility.      Left Ear: Hearing, tympanic membrane, ear canal and external ear normal. Tympanic membrane is bulging. Tympanic membrane has decreased mobility.      Nose: Nose normal.      Right Sinus: No maxillary sinus tenderness or frontal sinus tenderness.      Left Sinus: No maxillary sinus tenderness or frontal sinus tenderness.      Mouth/Throat:      Lips: Pink.      Mouth: Mucous membranes are moist.      Dentition: Normal dentition.      Tongue: No lesions.      Pharynx: Oropharynx is clear. Uvula midline.      Tonsils: No tonsillar exudate.   Eyes:      General: Lids are normal.      Conjunctiva/sclera: Conjunctivae normal.      Pupils: Pupils are equal, round, and reactive to light.   Neck:      Thyroid: No thyroid mass, thyromegaly or thyroid tenderness.      Vascular: No carotid bruit.      Trachea: Trachea and phonation normal. No tracheal tenderness.   Cardiovascular:      Rate and Rhythm: Normal rate and regular rhythm.      Pulses: Normal pulses.      Heart sounds: Normal heart sounds, S1 normal and S2 normal. No murmur heard.  Pulmonary:      Effort: Pulmonary effort is normal.      Breath sounds: Normal breath sounds and air entry.   Abdominal:      General: Bowel sounds are normal.      Palpations: Abdomen is soft.      Tenderness: There is no abdominal tenderness. There is no right CVA tenderness, left CVA tenderness, guarding or rebound. Negative signs include Berg's sign, Rovsing's sign, McBurney's sign, psoas sign and obturator sign.   Musculoskeletal:      Cervical back: Neck supple.      Right lower leg: No edema.      Left lower leg: No edema.    Lymphadenopathy:      Cervical: No cervical adenopathy.      Right cervical: No superficial, deep or posterior cervical adenopathy.     Left cervical: No superficial, deep or posterior cervical adenopathy.   Skin:     General: Skin is warm and dry.      Capillary Refill: Capillary refill takes less than 2 seconds.   Neurological:      Mental Status: She is alert and oriented to person, place, and time.      Deep Tendon Reflexes:      Reflex Scores:       Patellar reflexes are 2+ on the right side and 2+ on the left side.  Psychiatric:         Attention and Perception: Attention and perception normal.         Mood and Affect: Mood and affect normal.         Speech: Speech normal.         Behavior: Behavior is cooperative.         Thought Content: Thought content normal.         Cognition and Memory: Cognition and memory normal.         Judgment: Judgment normal.      Result Review :       Orders Only on 05/31/2023   Component Date Value Ref Range Status    Insulin 05/31/2023 9.7  uIU/mL Final    Comment: Reference Range:  Pubertal Children and Adults (fasting):  < or = 17      Cortisol - AM 05/31/2023 23.9 (H)  6.2 - 19.4 ug/dL Final    Vitamin B-12 05/31/2023 381  232 - 1,245 pg/mL Final    Folate 05/31/2023 19.6  >3.0 ng/mL Final    Comment: A serum folate concentration of less than 3.1 ng/mL is  considered to represent clinical deficiency.      Homocystine, Plasma (Quant) 05/31/2023 6.6  0.0 - 14.5 umol/L Final    CRP, High Sensitivity 05/31/2023 6.47 (H)  0.00 - 3.00 mg/L Final    Comment:          Relative Risk for Future Cardiovascular Event                               Low                 <1.00                               Average       1.00 - 3.00                               High                >3.00      Apolipoprotein B 05/31/2023 71  <90 mg/dL Final    Comment:                          Desirable               < 90                           Borderline High     90 -  99                           High                100 - 130                           Very High               >130       --------------------------------------------------            ASCVD RISK              THERAPEUTIC TARGET             CATEGORY                  APO B (mg/dL)          Very High Risk        <80 (if extreme risk <70)          High Risk             <90          Moderate Risk         <90      DHEA 05/31/2023 212  31 - 701 ng/dL Final    Sex Hormone Binding Globulin 05/31/2023 278.0 (H)  24.6 - 122.0 nmol/L Final    Comment: Results confirmed on  dilution.     Orders Only on 05/26/2023   Component Date Value Ref Range Status    WBC 05/31/2023 9.70  3.40 - 10.80 10*3/mm3 Final    RBC 05/31/2023 4.91  3.77 - 5.28 10*6/mm3 Final    Hemoglobin 05/31/2023 13.8  12.0 - 15.9 g/dL Final    Hematocrit 05/31/2023 41.5  34.0 - 46.6 % Final    MCV 05/31/2023 84.5  79.0 - 97.0 fL Final    MCH 05/31/2023 28.1  26.6 - 33.0 pg Final    MCHC 05/31/2023 33.3  31.5 - 35.7 g/dL Final    RDW 05/31/2023 13.1  12.3 - 15.4 % Final    Platelets 05/31/2023 269  140 - 450 10*3/mm3 Final    Neutrophil Rel % 05/31/2023 60.4  42.7 - 76.0 % Final    Lymphocyte Rel % 05/31/2023 28.1  19.6 - 45.3 % Final    Monocyte Rel % 05/31/2023 8.8  5.0 - 12.0 % Final    Eosinophil Rel % 05/31/2023 1.9  0.3 - 6.2 % Final    Basophil Rel % 05/31/2023 0.3  0.0 - 1.5 % Final    Neutrophils Absolute 05/31/2023 5.86  1.70 - 7.00 10*3/mm3 Final    Lymphocytes Absolute 05/31/2023 2.73  0.70 - 3.10 10*3/mm3 Final    Monocytes Absolute 05/31/2023 0.85  0.10 - 0.90 10*3/mm3 Final    Eosinophils Absolute 05/31/2023 0.18  0.00 - 0.40 10*3/mm3 Final    Basophils Absolute 05/31/2023 0.03  0.00 - 0.20 10*3/mm3 Final    Immature Granulocyte Rel % 05/31/2023 0.5  0.0 - 0.5 % Final    Immature Grans Absolute 05/31/2023 0.05  0.00 - 0.05 10*3/mm3 Final    nRBC 05/31/2023 0.0  0.0 - 0.2 /100 WBC Final    Glucose 05/31/2023 81  65 - 99 mg/dL Final    BUN 05/31/2023 11  6 - 20 mg/dL Final    Creatinine  05/31/2023 0.81  0.57 - 1.00 mg/dL Final    EGFR Result 05/31/2023 92.5  >60.0 mL/min/1.73 Final    Comment: GFR Normal >60  Chronic Kidney Disease <60  Kidney Failure <15      BUN/Creatinine Ratio 05/31/2023 13.6  7.0 - 25.0 Final    Sodium 05/31/2023 140  136 - 145 mmol/L Final    Potassium 05/31/2023 4.5  3.5 - 5.2 mmol/L Final    Chloride 05/31/2023 106  98 - 107 mmol/L Final    Total CO2 05/31/2023 21.6 (L)  22.0 - 29.0 mmol/L Final    Calcium 05/31/2023 9.4  8.6 - 10.5 mg/dL Final    Total Protein 05/31/2023 6.7  6.0 - 8.5 g/dL Final    Albumin 05/31/2023 3.8  3.5 - 5.2 g/dL Final    Globulin 05/31/2023 2.9  gm/dL Final    A/G Ratio 05/31/2023 1.3  g/dL Final    Total Bilirubin 05/31/2023 0.4  0.0 - 1.2 mg/dL Final    Alkaline Phosphatase 05/31/2023 60  39 - 117 U/L Final    AST (SGOT) 05/31/2023 11  1 - 32 U/L Final    ALT (SGPT) 05/31/2023 16  1 - 33 U/L Final    TSH 05/31/2023 3.670  0.450 - 4.500 uIU/mL Final    T4, Total 05/31/2023 9.3  4.5 - 12.0 ug/dL Final    T3 Uptake 05/31/2023 20 (L)  24 - 39 % Final    Free Thyroxine Index 05/31/2023 1.9  1.2 - 4.9 Final    Total Cholesterol 05/31/2023 169  0 - 200 mg/dL Final    Comment: Cholesterol Reference Ranges  (U.S. Department of Health and Human Services ATP III  Classifications)  Desirable          <200 mg/dL  Borderline High    200-239 mg/dL  High Risk          >240 mg/dL  Triglyceride Reference Ranges  (U.S. Department of Health and Human Services ATP III  Classifications)  Normal           <150 mg/dL  Borderline High  150-199 mg/dL  High             200-499 mg/dL  Very High        >500 mg/dL  HDL Reference Ranges  (U.S. Department of Health and Human Services ATP III  Classifications)  Low     <40 mg/dl (major risk factor for CHD)  High    >60 mg/dl ('negative' risk factor for CHD)  LDL Reference Ranges  (U.S. Department of Health and Human Services ATP III  Classifications)  Optimal          <100 mg/dL  Near Optimal     100-129 mg/dL  Borderline High   130-159 mg/dL  High             160-189 mg/dL  Very High        >189 mg/dL      Triglycerides 05/31/2023 109  0 - 150 mg/dL Final    HDL Cholesterol 05/31/2023 71 (H)  40 - 60 mg/dL Final    VLDL Cholesterol Frandy 05/31/2023 19  5 - 40 mg/dL Final    LDL Chol Calc (NIH) 05/31/2023 79  0 - 100 mg/dL Final    LDL/HDL RATIO 05/31/2023 1.07   Final                   Assessment and Plan    Diagnoses and all orders for this visit:    1. Annual physical exam (Primary)    2. Acquired hypothyroidism    3. HERBERT (generalized anxiety disorder)    4. Post-COVID syndrome    5. Eustachian tube dysfunction, bilateral  -     methylPREDNISolone (MEDROL) 4 MG dose pack; Take as directed on package instructions.  Dispense: 21 tablet; Refill: 0    Annual physical exam: I have reviewed above blood work with her today.  She will continue to eat healthier and increasing her physical activity.  Return to office in 6 months.  2.   Chronic and uncontrolled hypothyroidism: TSH was not at goal.  Her thyroid was recently adjusted with increase in medication.  She will return to office in 6 weeks for reevaluation with TSH.  3.  Chronic and stable generalized anxiety disorder: Doing well with her current Prozac medication.  She will keep follow-up appointment.  4. Chronic and improving post COVID syndrome: She has an upcoming appointment with the [facility] in July 2023.  Ni will return to office after her appointment with the [facility] for further evaluation.  I will consider referral to endocrinology about cortisol levels after her appointment with [facility].  She voiced understanding.  5.  New a eustachian tube dysfunction bilaterally: I have given her a prescription of a prednisone Dosepak.  Will use as directed.  Continue her Astelin nasal spray.  Return to office if symptoms do not improve.    Patient Counseling:  --Nutrition: Stressed importance of moderation in sodium/caffeine intake, saturated fat and cholesterol.  Discussed caloric  balance, sufficient intake of fresh fruits, vegetables, fiber,   calcium, iron.  --Discussed the new recommendation against daily use of baby aspirin for primary prevention in low risk patients.  --Exercise: Stressed the importance of regular exercise by incorporating into daily routine.    --Substance Abuse: Discussed cessation/primary prevention of tobacco, alcohol, or other drug use; driving or other dangerous activities under the influence.    --Dental health: Discussed importance of regular tooth brushing, flossing, and dental visits.  -- Suggested having eyes and vision checked if needed or past due.  --Immunizations reviewed.        Follow Up   Return in about 6 weeks (around 7/20/2023).  Patient was given instructions and counseling regarding her condition or for health maintenance advice. Please see specific information pulled into the AVS if appropriate.     BELINDA Munoz [facility]  [address] [place] [id number]  Dept: [phone]  Dept Fax: [phone]  Loc: [phone]  Loc Fax: [phone]

## 2023-07-20 PROBLEM — J45.909 MODERATE ASTHMA: Status: ACTIVE | Noted: 2023-07-20

## 2023-07-27 ENCOUNTER — HOSPITAL ENCOUNTER (OUTPATIENT)
Dept: SLEEP MEDICINE | Facility: HOSPITAL | Age: 43
Discharge: HOME | End: 2023-07-27
Admitting: FAMILY MEDICINE
Payer: COMMERCIAL

## 2023-07-27 DIAGNOSIS — G47.8 NON-RESTORATIVE SLEEP: ICD-10-CM

## 2023-07-27 DIAGNOSIS — G47.30 SLEEP APNEA, UNSPECIFIED TYPE: ICD-10-CM

## 2023-07-27 DIAGNOSIS — G25.81 RESTLESS LEGS: ICD-10-CM

## 2023-07-27 DIAGNOSIS — G47.10 HYPERSOMNIA: ICD-10-CM

## 2023-07-27 DIAGNOSIS — G47.63 SLEEP-RELATED BRUXISM: ICD-10-CM

## 2023-07-27 DIAGNOSIS — E66.9 OBESITY (BMI 30-39.9): ICD-10-CM

## 2023-07-27 DIAGNOSIS — R06.83 SNORING: ICD-10-CM

## 2023-07-27 PROCEDURE — 95806 SLEEP STUDY UNATT&RESP EFFT: CPT

## 2023-07-27 PROCEDURE — 95806 SLEEP STUDY UNATT&RESP EFFT: CPT | Performed by: FAMILY MEDICINE

## 2023-07-28 DIAGNOSIS — E66.9 OBESITY (BMI 30-39.9): ICD-10-CM

## 2023-07-28 DIAGNOSIS — R06.83 SNORING: ICD-10-CM

## 2023-07-28 DIAGNOSIS — G47.33 OBSTRUCTIVE SLEEP APNEA: Primary | ICD-10-CM

## 2023-07-28 DIAGNOSIS — G47.63 SLEEP-RELATED BRUXISM: ICD-10-CM

## 2023-07-28 DIAGNOSIS — G25.81 RESTLESS LEGS: ICD-10-CM

## 2023-07-28 DIAGNOSIS — G47.10 HYPERSOMNIA: ICD-10-CM

## 2023-07-28 DIAGNOSIS — G47.8 NON-RESTORATIVE SLEEP: ICD-10-CM

## 2023-07-31 ENCOUNTER — OFFICE VISIT (OUTPATIENT)
Dept: CARDIOLOGY | Facility: CLINIC | Age: 43
End: 2023-07-31
Payer: COMMERCIAL

## 2023-07-31 VITALS
WEIGHT: 215 LBS | HEART RATE: 78 BPM | OXYGEN SATURATION: 98 % | DIASTOLIC BLOOD PRESSURE: 68 MMHG | SYSTOLIC BLOOD PRESSURE: 106 MMHG | BODY MASS INDEX: 32.58 KG/M2 | HEIGHT: 68 IN

## 2023-07-31 DIAGNOSIS — U09.9 POST-COVID CHRONIC DYSPNEA: ICD-10-CM

## 2023-07-31 DIAGNOSIS — R00.0 TACHYCARDIA: Primary | ICD-10-CM

## 2023-07-31 DIAGNOSIS — E03.9 ACQUIRED HYPOTHYROIDISM: ICD-10-CM

## 2023-07-31 DIAGNOSIS — R06.09 POST-COVID CHRONIC DYSPNEA: ICD-10-CM

## 2023-07-31 PROCEDURE — 99214 OFFICE O/P EST MOD 30 MIN: CPT | Performed by: NURSE PRACTITIONER

## 2023-07-31 PROCEDURE — 93000 ELECTROCARDIOGRAM COMPLETE: CPT | Performed by: NURSE PRACTITIONER

## 2023-07-31 RX ORDER — BUDESONIDE 0.5 MG/2ML
INHALANT ORAL
COMMUNITY
Start: 2023-07-12

## 2023-08-01 DIAGNOSIS — E03.9 ACQUIRED HYPOTHYROIDISM: ICD-10-CM

## 2023-08-02 RX ORDER — LEVOTHYROXINE SODIUM 75 MCG
75 TABLET ORAL DAILY
Qty: 90 TABLET | Refills: 1 | Status: SHIPPED | OUTPATIENT
Start: 2023-08-02

## 2023-08-02 RX ORDER — LEVOTHYROXINE SODIUM 50 MCG
TABLET ORAL
Qty: 90 TABLET | Refills: 3 | OUTPATIENT
Start: 2023-08-02

## 2023-08-03 ENCOUNTER — TELEPHONE (OUTPATIENT)
Dept: SLEEP MEDICINE | Facility: HOSPITAL | Age: 43
End: 2023-08-03
Payer: COMMERCIAL

## 2023-08-16 DIAGNOSIS — U09.9 POST-COVID SYNDROME: ICD-10-CM

## 2023-08-16 RX ORDER — DROSPIRENONE AND ETHINYL ESTRADIOL 0.03MG-3MG
KIT ORAL
Qty: 84 TABLET | Refills: 0 | Status: SHIPPED | OUTPATIENT
Start: 2023-08-16

## 2023-09-01 ENCOUNTER — OFFICE VISIT (OUTPATIENT)
Dept: FAMILY MEDICINE CLINIC | Facility: CLINIC | Age: 43
End: 2023-09-01
Payer: COMMERCIAL

## 2023-09-01 VITALS
OXYGEN SATURATION: 99 % | HEART RATE: 82 BPM | WEIGHT: 216 LBS | TEMPERATURE: 97.8 F | HEIGHT: 68 IN | RESPIRATION RATE: 16 BRPM | DIASTOLIC BLOOD PRESSURE: 74 MMHG | SYSTOLIC BLOOD PRESSURE: 118 MMHG | BODY MASS INDEX: 32.74 KG/M2

## 2023-09-01 DIAGNOSIS — U09.9 COVID-19 LONG HAULER: Primary | ICD-10-CM

## 2023-09-01 PROBLEM — G25.81 RESTLESS LEG SYNDROME: Status: ACTIVE | Noted: 2023-07-25

## 2023-09-01 PROBLEM — G47.00 INSOMNIA DISORDER: Status: ACTIVE | Noted: 2023-07-10

## 2023-09-01 PROBLEM — G47.33 OBSTRUCTIVE SLEEP APNEA SYNDROME IN ADULT: Status: ACTIVE | Noted: 2023-07-25

## 2023-09-01 PROCEDURE — 99213 OFFICE O/P EST LOW 20 MIN: CPT | Performed by: PHYSICIAN ASSISTANT

## 2023-09-01 RX ORDER — MONTELUKAST SODIUM 10 MG/1
10 TABLET ORAL
COMMUNITY
Start: 2023-08-16

## 2023-09-01 RX ORDER — BUDESONIDE, GLYCOPYRROLATE, AND FORMOTEROL FUMARATE 160; 9; 4.8 UG/1; UG/1; UG/1
AEROSOL, METERED RESPIRATORY (INHALATION)
COMMUNITY
Start: 2023-08-31

## 2023-09-01 RX ORDER — DUPILUMAB 300 MG/2ML
INJECTION, SOLUTION SUBCUTANEOUS
COMMUNITY
Start: 2023-08-27

## 2023-09-01 NOTE — PROGRESS NOTES
"Chief Complaint  COVID Long Hauler-Discuss FMLA Paperwork    Subjective          Ni Rodríguez presents to [facility] PRIMARY CARE  History of Present Illness  Ni is a 43-year-old female who presents for COVID long-hauler management.  States that she has been seeing the [facility] and local pulmonologist and allergist.  Recently started on Dupixent.  They are hoping this will help with some of her breathing issues.  Also was diagnosed with sleep apnea and started a CPAP machine.  States it is too early to tell if this is going to be helpful or not for her breathing and fatigue issues.  Ni states she has periods of fatigue issues.  States she has difficulty working and maintaining home life balances.  States she cannot do both.  States she gets very tired and has to sleep a lot.  States she is looking at possibly getting disability or getting paperwork completed for FMLA.  She is a  and has noticed that she is getting more fatigued as school started.  They have are also have been sick children in class.  She is nervous about getting COVID again.  Has an appointment with [facility] next month.  Appetite has been normal.  Sleep has been restless to increase.  She has been trying to exercise but is too tired at times.  She has started taking her vitamins again as well.     Objective   Vital Signs:   /74   Pulse 82   Temp 97.8 øF (36.6 øC)   Resp 16   Ht 172.7 cm (68\")   Wt 98 kg (216 lb)   SpO2 99%   BMI 32.84 kg/mý     Physical Exam  Constitutional:       Appearance: Normal appearance. She is well-groomed. She is obese.      Interventions: Face mask in place.   HENT:      Head: Normocephalic and atraumatic.   Skin:     General: Skin is warm.      Capillary Refill: Capillary refill takes less than 2 seconds.   Neurological:      Mental Status: She is alert and oriented to person, place, and time.   Psychiatric:         Attention and Perception: Attention normal.       "   Mood and Affect: Mood and affect normal.         Speech: Speech normal.         Behavior: Behavior normal. Behavior is cooperative.         Thought Content: Thought content normal.         Cognition and Memory: Cognition and memory normal.         Judgment: Judgment normal.      Result Review :                 Assessment and Plan    Diagnoses and all orders for this visit:    1. COVID-19 long hauler (Primary)    Ni was seen in office today to discuss her long-kj COVID.  We have discussed possibly obtaining paperwork for FMLA.  She will get information from her treatment or resource department at school.  She will also discuss this with .  I am hoping the Dupixent and CPAP will help with her symptoms.  Meanwhile she will start taking over-the-counter vitamin D, multivitamin and vitamin C.  I have encouraged her to be more positive and not to force things with recovery.  She is to try to take things day by day.  Keep appointment with [facility] in September.    I spent 22 minutes caring for Ni on this date of service. This time includes time spent by me in the following activities:preparing for the visit, obtaining and/or reviewing a separately obtained history, performing a medically appropriate examination and/or evaluation , counseling and educating the patient/family/caregiver, and documenting information in the medical record  Follow Up   Return in about 5 weeks (around 10/6/2023), or Long hauler COVID.  Patient was given instructions and counseling regarding her condition or for health maintenance advice. Please see specific information pulled into the AVS if appropriate.     BELINDA Munoz [facility]  [address] [place] [id number]  Dept: [phone]  Dept Fax: [phone]  Loc: [phone]  Loc Fax: [phone]

## 2023-09-03 DIAGNOSIS — F41.1 GAD (GENERALIZED ANXIETY DISORDER): ICD-10-CM

## 2023-09-05 RX ORDER — ALPRAZOLAM 0.5 MG/1
TABLET ORAL
Qty: 30 TABLET | Refills: 0 | Status: SHIPPED | OUTPATIENT
Start: 2023-09-05

## 2023-09-28 ENCOUNTER — OFFICE VISIT (OUTPATIENT)
Dept: SLEEP MEDICINE | Facility: HOSPITAL | Age: 43
End: 2023-09-28
Payer: COMMERCIAL

## 2023-09-28 VITALS
DIASTOLIC BLOOD PRESSURE: 75 MMHG | BODY MASS INDEX: 32.58 KG/M2 | HEART RATE: 78 BPM | HEIGHT: 68 IN | SYSTOLIC BLOOD PRESSURE: 112 MMHG | WEIGHT: 215 LBS | OXYGEN SATURATION: 99 %

## 2023-09-28 DIAGNOSIS — G47.33 OBSTRUCTIVE SLEEP APNEA, ADULT: Primary | ICD-10-CM

## 2023-09-28 DIAGNOSIS — E66.9 CLASS 1 OBESITY WITH BODY MASS INDEX (BMI) OF 32.0 TO 32.9 IN ADULT, UNSPECIFIED OBESITY TYPE, UNSPECIFIED WHETHER SERIOUS COMORBIDITY PRESENT: ICD-10-CM

## 2023-09-28 PROCEDURE — G0463 HOSPITAL OUTPT CLINIC VISIT: HCPCS

## 2023-09-28 NOTE — PROGRESS NOTES
"  [facility]  [address] [address]  Phone   Fax       SLEEP CLINIC FOLLOW UP PROGRESS NOTE.    Ni Rodríguez  [id number]   1980  43 y.o.  female      PCP: Miri Deshpande PA-C      Date of visit: 9/28/2023    Chief Complaint   Patient presents with    Sleep Apnea         HPI:  This is a 43 y.o. years old patient is here for the management of obstructive sleep apnea.  Presents to sleep clinic for first follow-up after starting auto CPAP therapy for mild severity obstructive sleep apnea on 7/27/2023 HST AHI of 7.6.. Patient is using positive airway pressure therapy and the symptoms of sleep apnea have improved significantly on the therapy. Normally patient goes to bed at 930 PM and wakes up at 6 AM .  The patient wakes up 3-4 time(s) during the night and has no problem going back to sleep.  Feels refreshed after waking up.     Overall patient's Impression of their PAP therapy is: \"Pretty good. I Sleep more soundly at night\".  Full face mask - likes this mask   States she's still getting use to wearing it every night  States her sleep problems have signficantly improved and now wakes up feeling refreshed   Offer no other sleep related complaints   Motivated to continue using CPAP       Compliance data reviewed with patient in room today by me  Date range 8/16/2023 to 9/26/2023  Overall usage is 81%  Greater than or equal to 4-hour tl is 64%-suboptimal  Average use 5 hours 44 minutes  Device AirSense 11 AutoSet  Settings AutoSet for her  Pressure ranges 8 cm H2O to 16 cm H2O EPR 1  95th percentile pressure is 10.8 cm H2O  95th percentile leak is 3.4 LPM  Residual AHI 0.8      -Last seen in sleep clinic approximately 3 months ago on 7/21/2023 by Dr. Shoemaker at that time noted an urge sensation 2-3 times a week on iron therapy, I excessive daytime sleepiness with Chester of 16/24      REVIEW OF SYSTEMS:   Is negative unless otherwise noted in " "HPI  Normandy Sleepiness Scale :Total score: 9     Disclaimer History: The above history is based on this sleep physician's in room encounter with the patient. Pre encounter self administered questionnaires are taken into consideration and discussed with patient for any discordance. The above documentation by this sleep physician is the most accurate clinical information determined by in room sleep physician encounter with patient.     PHYSICAL EXAMINATION:  Vitals:    09/28/23 1500   BP: 112/75   Pulse: 78   SpO2: 99%   Weight: 97.5 kg (215 lb)   Height: 172.7 cm (68\")    Body mass index is 32.69 kg/m².   CONSTITUTIONAL: Non-toxic appearing, in no overt distress   EENT: Mallampati IV, Macroglossia  NOSE: nasal passages are clear, No deformities noted   RESP SYSTEM:  No overt respiratory distress, speaks in clear sentences without dyspnea, no accessory muscle use, no dyspnea  CARDIOVASULAR: No edema noted  NEURO: Oriented x 3, gait normal,  Mood and affect appeared appropriate        Compliance data reviewed with patient in room today by me  Date range 8/16/2023 to 9/26/2023  Overall usage is 81%  Greater than or equal to 4-hour tl is 64%-suboptimal  Average use 5 hours 44 minutes  Device AirSense 11 AutoSet  Settings AutoSet for her  Pressure ranges 8 cm H2O to 16 cm H2O EPR 1  95th percentile pressure is 10.8 cm H2O  95th percentile leak is 3.4 LPM  Residual AHI 0.8        ASSESSMENT AND PLAN:  Obstructive sleep apnea ( G 47.33).  The symptoms of sleep apnea have improved with the device and the treatment.  Patient's compliance with the device is sub optimal for treatment of sleep apnea. Patient has continued use and benefit. Counseled compliance, patient is motivated to use, will continue to use now that she's get more acclimated to PAP therapy. I'll follow up with her in 3 months for compliance check. Her excessive daytime sleepiness is significantly improved normal epworth 9/24.  I have independently reviewed " the smart card down load and discussed with the patient the download data and encouarged the patient to continue to use the device.The residual AHI is acceptable. The device is benefiting the patient and the device is medically necessary.  Without proper control of sleep apnea and good compliance there is a increased risk for hypertension, diabetes mellitus and nonrestorative sleep with hypersomnia which can increase risk for motor vehicle accidents.  Untreated sleep apnea is also a risk factor for development of atrial fibrillation, pulmonary hypertension, insulin resistance and stroke. The patient is also instructed to get the supplies from the DME company and and change them on a regular basis.  A prescription for supplies has been sent to the [facility] [facility].  I have also discussed the good sleep hygiene habits and adequate amount of sleep needed for good health.  Obesity with BMI is Body mass index is 32.69 kg/m².. Counseled weight loss will be beneficial for reduction in severity of sleep apnea, healthy diet/exercise to achieve same, follow up with primary care physician for serial monitoring and to further guide management.     F/u 3 months for compliance check . Patient's questions were answered.      EMR Dragon/Transcription disclaimer:   Much of this encounter note is an electronic transcription/translation of spoken language to printed text. The electronic translation of spoken language may permit erroneous, or at times, nonsensical words or phrases to be inadvertently transcribed; Although I have reviewed the note for such errors, some may still exist.       NPI #: [id number]    Rosie Fajardo, DO  Sleep Medicine  [facility]  09/28/23

## 2023-10-06 ENCOUNTER — OFFICE VISIT (OUTPATIENT)
Dept: FAMILY MEDICINE CLINIC | Facility: CLINIC | Age: 43
End: 2023-10-06
Payer: COMMERCIAL

## 2023-10-06 VITALS
DIASTOLIC BLOOD PRESSURE: 82 MMHG | TEMPERATURE: 98.4 F | RESPIRATION RATE: 14 BRPM | OXYGEN SATURATION: 99 % | WEIGHT: 216 LBS | BODY MASS INDEX: 32.74 KG/M2 | SYSTOLIC BLOOD PRESSURE: 120 MMHG | HEIGHT: 68 IN | HEART RATE: 85 BPM

## 2023-10-06 DIAGNOSIS — F41.1 GAD (GENERALIZED ANXIETY DISORDER): ICD-10-CM

## 2023-10-06 DIAGNOSIS — Z23 NEED FOR INFLUENZA VACCINATION: ICD-10-CM

## 2023-10-06 DIAGNOSIS — U09.9 COVID-19 LONG HAULER: Primary | ICD-10-CM

## 2023-10-06 RX ORDER — RIMEGEPANT SULFATE 75 MG/75MG
75 TABLET, ORALLY DISINTEGRATING ORAL
COMMUNITY
Start: 2023-09-27

## 2023-10-06 RX ORDER — DULOXETIN HYDROCHLORIDE 30 MG/1
30 CAPSULE, DELAYED RELEASE ORAL DAILY
Qty: 30 CAPSULE | Refills: 0 | Status: SHIPPED | OUTPATIENT
Start: 2023-10-06

## 2023-10-06 NOTE — LETTER
October 6, 2023     Patient: Ni Rodríguez   YOB: 1980   Date of Visit: 10/6/2023       To Whom It May Concern:    It is my medical opinion that Ni Rodríguez  have intermittent leave due to her long hauler COVID symptoms.  She may be absent up to 6 days/month depending on symptoms and follow-up medical appointments with PCP and specialist.   Ni may have flare ups of increased fatigue, breathing issues and heart palpitations.  Her symptoms may be exacerbated by exposure to illnesses and possibly weather changes.  She will be evaluated monthly.  I expect her condition may last several months but is undetermined at this time. She had a recent appointment with [facility] who have helped her developed a plan to help her with her long term COVID.  I can see an improvement with her her managing her chronic condition.     Your attention in this matter is greatly appreciated      Miri Deshpande PA-C    CC: No Recipients

## 2023-10-06 NOTE — PROGRESS NOTES
"Chief Complaint  post covid (1 month follow up )    Subjective          Ni Rodríguez presents to [facility]  History of Present Illness  Ni is a 43 year old female who presents for long COVID symptoms.  She was recently seen at [facility] and was able to develop a plan to help with her good days, okay days and a difficult day.  She has also developed a plan for home.  States she knows that she has long-term COVID and will have to learn to deal with her symptoms.  She has been learning coping mechanisms.  She also learned that they are treating the long COVID-like fibromyalgia type patients.  She has been taking the Prozac medication but does not feel it is helping much with depression symptoms.  States she took a depression score at [place] and she scored a 44.  Denied any suicidal or homicidal ideation.  States she has good family support at home.  States the Dupixent has helped with her breathing issues.  States the CPAP machine has helped as well.  Ni would like flu shot today.  Denied any fevers, chills or upper respiratory symptoms.  Overall her diet has been healthy.     Objective   Vital Signs:   /82 (BP Location: Left arm, Patient Position: Sitting, Cuff Size: Adult)   Pulse 85   Temp 98.4 °F (36.9 °C)   Resp 14   Ht 172.7 cm (68\")   Wt 98 kg (216 lb)   SpO2 99%   BMI 32.84 kg/m²     Physical Exam  Vitals and nursing note reviewed.   Constitutional:       Appearance: Normal appearance. She is well-developed and well-groomed. She is obese.   HENT:      Head: Normocephalic and atraumatic.      Jaw: There is normal jaw occlusion.      Right Ear: Hearing, tympanic membrane, ear canal and external ear normal.      Left Ear: Hearing, tympanic membrane, ear canal and external ear normal.      Nose: Nose normal.      Right Sinus: No maxillary sinus tenderness or frontal sinus tenderness.      Left Sinus: No maxillary sinus tenderness or frontal sinus tenderness.      " Mouth/Throat:      Lips: Pink.      Mouth: Mucous membranes are moist.      Dentition: Normal dentition.      Tongue: No lesions.      Pharynx: Oropharynx is clear. Uvula midline.      Tonsils: No tonsillar exudate.   Eyes:      General: Lids are normal.      Conjunctiva/sclera: Conjunctivae normal.      Pupils: Pupils are equal, round, and reactive to light.   Neck:      Thyroid: No thyroid mass, thyromegaly or thyroid tenderness.      Vascular: No carotid bruit.      Trachea: Trachea and phonation normal. No tracheal tenderness.   Cardiovascular:      Rate and Rhythm: Normal rate and regular rhythm.      Pulses: Normal pulses.      Heart sounds: Normal heart sounds, S1 normal and S2 normal. No murmur heard.  Pulmonary:      Effort: Pulmonary effort is normal.      Breath sounds: Normal breath sounds and air entry.   Abdominal:      General: Bowel sounds are normal.      Palpations: Abdomen is soft.      Tenderness: There is no abdominal tenderness. There is no right CVA tenderness, left CVA tenderness, guarding or rebound. Negative signs include Berg's sign, Rovsing's sign, McBurney's sign, psoas sign and obturator sign.   Musculoskeletal:      Cervical back: Neck supple.      Right lower leg: No edema.      Left lower leg: No edema.   Lymphadenopathy:      Cervical: No cervical adenopathy.      Right cervical: No superficial, deep or posterior cervical adenopathy.     Left cervical: No superficial, deep or posterior cervical adenopathy.   Skin:     General: Skin is warm and dry.      Capillary Refill: Capillary refill takes less than 2 seconds.   Neurological:      Mental Status: She is alert and oriented to person, place, and time.   Psychiatric:         Attention and Perception: Attention and perception normal.         Mood and Affect: Mood and affect normal.         Speech: Speech normal.         Behavior: Behavior is cooperative.         Thought Content: Thought content normal.         Cognition and Memory:  Cognition and memory normal.         Judgment: Judgment normal.      Result Review :                 Assessment and Plan    Diagnoses and all orders for this visit:    1. COVID-19 long hauler (Primary)  -     DULoxetine (Cymbalta) 30 MG capsule; Take 1 capsule by mouth Daily.  Dispense: 30 capsule; Refill: 0    2. HERBERT (generalized anxiety disorder)  -     DULoxetine (Cymbalta) 30 MG capsule; Take 1 capsule by mouth Daily.  Dispense: 30 capsule; Refill: 0    3. Need for influenza vaccination  -     Fluzone (or Fluarix & Flulaval for VFC) >6mos    Chronic and stable COVID long-haul: She will start Cymbalta medication to see if this will help with her alcohol depression and fibromyalgia type symptoms.  She will stop the Prozac medication.  Follow-up in 1 month.  I have given her a work letter.  Need for influenza vaccination: She has given written consent to receive flu shot today.    I spent 30 minutes caring for Ni on this date of service. This time includes time spent by me in the following activities:preparing for the visit, obtaining and/or reviewing a separately obtained history, performing a medically appropriate examination and/or evaluation , counseling and educating the patient/family/caregiver, ordering medications, tests, or procedures, and documenting information in the medical record  Follow Up   Return in about 4 weeks (around 11/3/2023), or long hauler depression.  Patient was given instructions and counseling regarding her condition or for health maintenance advice. Please see specific information pulled into the AVS if appropriate.     BELINDA Munoz [facility]  [address] [place] [id number]  Dept: [phone]  Dept Fax: [phone]  Loc: [phone]  Loc Fax: [phone]

## 2023-11-01 ENCOUNTER — OFFICE VISIT (OUTPATIENT)
Dept: FAMILY MEDICINE CLINIC | Facility: CLINIC | Age: 43
End: 2023-11-01
Payer: COMMERCIAL

## 2023-11-01 VITALS
BODY MASS INDEX: 33.19 KG/M2 | DIASTOLIC BLOOD PRESSURE: 78 MMHG | OXYGEN SATURATION: 99 % | HEART RATE: 87 BPM | WEIGHT: 219 LBS | RESPIRATION RATE: 17 BRPM | SYSTOLIC BLOOD PRESSURE: 108 MMHG | HEIGHT: 68 IN

## 2023-11-01 DIAGNOSIS — F41.1 GAD (GENERALIZED ANXIETY DISORDER): ICD-10-CM

## 2023-11-01 DIAGNOSIS — U09.9 COVID-19 LONG HAULER: Primary | ICD-10-CM

## 2023-11-01 PROCEDURE — 99213 OFFICE O/P EST LOW 20 MIN: CPT | Performed by: PHYSICIAN ASSISTANT

## 2023-11-01 RX ORDER — DULOXETIN HYDROCHLORIDE 30 MG/1
30 CAPSULE, DELAYED RELEASE ORAL DAILY
Qty: 30 CAPSULE | Refills: 3 | Status: SHIPPED | OUTPATIENT
Start: 2023-11-01

## 2023-11-01 RX ORDER — EPINEPHRINE 0.3 MG/.3ML
INJECTION, SOLUTION INTRAMUSCULAR
COMMUNITY
Start: 2023-10-19

## 2023-11-01 NOTE — PROGRESS NOTES
"Chief Complaint  COVID 19 long kj    Subjective          Ni Rodríguez presents to [facility]  History of Present Illness  Ni is a 43 year old female who presents for follow-up on generalized anxiety disorder with long hauler COVID.  States she seems to be feeling better.  She has started physical therapy to help with strength and endurance.  Recently saw her pulmonologist who also noted some improvement.  She will continue her inhalers as directed.  She will keep follow-up appointments with [facility].  Has been taking the Cymbalta medication for anxiety issues.  States she has noticed improvement.  Denied any suicidal or homicidal ideation.  Her appetite and sleep have been improving.  She is looking at long-term disability from school.  Denied any current fever, chills, upper respiratory symptoms, chest pain, shortness of air, cough, wheezing, abdominal pain or GI upset.  States she has noticed some elevated heart rates at times.  States her heart rate will may get up to 110-120 bpm.  Feels like her heart when she is exercising.  She has reached out to cardiologist.     Objective   Vital Signs:   /78   Pulse 87   Resp 17   Ht 172.7 cm (68\")   Wt 99.3 kg (219 lb)   SpO2 99%   BMI 33.30 kg/m²     Physical Exam  Vitals and nursing note reviewed.   Constitutional:       Appearance: Normal appearance. She is well-developed and well-groomed. She is obese.   HENT:      Head: Normocephalic and atraumatic.   Neck:      Thyroid: No thyroid mass, thyromegaly or thyroid tenderness.      Vascular: No carotid bruit.      Trachea: Trachea and phonation normal. No tracheal tenderness.   Cardiovascular:      Rate and Rhythm: Normal rate and regular rhythm.      Pulses: Normal pulses.      Heart sounds: Normal heart sounds, S1 normal and S2 normal. No murmur heard.  Pulmonary:      Effort: Pulmonary effort is normal.      Breath sounds: Normal breath sounds and air entry. "   Abdominal:      General: Bowel sounds are normal.      Palpations: Abdomen is soft. There is no hepatomegaly.      Tenderness: There is no abdominal tenderness. There is no right CVA tenderness, left CVA tenderness, guarding or rebound. Negative signs include Berg's sign, Rovsing's sign, McBurney's sign, psoas sign and obturator sign.   Musculoskeletal:      Cervical back: Neck supple.      Right lower leg: No edema.      Left lower leg: No edema.   Skin:     General: Skin is warm and dry.      Capillary Refill: Capillary refill takes less than 2 seconds.   Neurological:      Mental Status: She is alert and oriented to person, place, and time.   Psychiatric:         Attention and Perception: Attention and perception normal.         Mood and Affect: Mood and affect normal.         Speech: Speech normal.         Behavior: Behavior normal. Behavior is cooperative.         Thought Content: Thought content normal.         Cognition and Memory: Cognition and memory normal.         Judgment: Judgment normal.        Result Review :                 Assessment and Plan    Diagnoses and all orders for this visit:    1. COVID-19 long hauler (Primary)  -     DULoxetine (Cymbalta) 30 MG capsule; Take 1 capsule by mouth Daily.  Dispense: 30 capsule; Refill: 3    2. HERBERT (generalized anxiety disorder)  -     DULoxetine (Cymbalta) 30 MG capsule; Take 1 capsule by mouth Daily.  Dispense: 30 capsule; Refill: 3     Chronic and improving Long hauler COVID: I have reviewed her recent pulmonology office notes with her today.  Overall her symptoms are improving.  She will continue her physical therapy as well.  Continue her current medications at home as directed.  I have given her a work letter stating her progress.  She will follow-up in 1 month.  Chronic and stable generalized anxiety disorder: Doing well to Cymbalta medication.  Have refilled the 30 mg medication to her pharmacy.    I spent 22 minutes caring for Ni on this date  of service. This time includes time spent by me in the following activities:preparing for the visit, obtaining and/or reviewing a separately obtained history, performing a medically appropriate examination and/or evaluation , counseling and educating the patient/family/caregiver, ordering medications, tests, or procedures, and documenting information in the medical record  Follow Up   Return in about 4 weeks (around 11/29/2023), or long hauler COVID.  Patient was given instructions and counseling regarding her condition or for health maintenance advice. Please see specific information pulled into the AVS if appropriate.     BELINDA Munoz [facility]  [address] [facility] [id number]  Dept: [phone]  Dept Fax: [phone]  Loc: [phone]  Loc Fax: [phone]

## 2023-11-01 NOTE — LETTER
November 1, 2023     Patient: Ni Rodríguez   YOB: 1980   Date of Visit: 11/1/2023       To Whom It May Concern:     It is my medical opinion that Ni Rodríguez  have intermittent leave due to her long hauler COVID symptoms.  She may be absent up to 6 days/month depending on symptoms and follow-up medical appointments with PCP and specialist.   Ni may have flare ups of increased fatigue, breathing issues and heart palpitations.  Her symptoms may be exacerbated by exposure to illnesses and possibly weather changes.  She will be evaluated monthly.  I expect her condition may last several months but is undetermined at this time. She had a recent appointment with [facility] who have helped her developed a plan to help her with her long term COVID.  She had a recent pulmonary appointment with signs of improvement.   I can see an improvement with her managing her chronic condition.     Your attention in this matter is greatly appreciated        Miri Deshpande PA-C     CC: No Recipients          Miri Deshpande PA-C    CC: No Recipients

## 2023-11-05 DIAGNOSIS — U09.9 POST-COVID SYNDROME: ICD-10-CM

## 2023-11-06 ENCOUNTER — TELEPHONE (OUTPATIENT)
Dept: CARDIOLOGY | Facility: CLINIC | Age: 43
End: 2023-11-06
Payer: COMMERCIAL

## 2023-11-06 DIAGNOSIS — R00.2 PALPITATIONS: Primary | ICD-10-CM

## 2023-11-06 DIAGNOSIS — R07.89 OTHER CHEST PAIN: ICD-10-CM

## 2023-11-06 RX ORDER — DROSPIRENONE AND ETHINYL ESTRADIOL 0.03MG-3MG
1 KIT ORAL DAILY
Qty: 84 TABLET | Refills: 0 | Status: SHIPPED | OUTPATIENT
Start: 2023-11-06

## 2023-11-28 ENCOUNTER — HOSPITAL ENCOUNTER (OUTPATIENT)
Dept: CT IMAGING | Facility: HOSPITAL | Age: 43
Discharge: HOME | End: 2023-11-28
Admitting: NURSE PRACTITIONER
Payer: COMMERCIAL

## 2023-11-28 VITALS
SYSTOLIC BLOOD PRESSURE: 99 MMHG | HEIGHT: 68 IN | DIASTOLIC BLOOD PRESSURE: 55 MMHG | HEART RATE: 66 BPM | RESPIRATION RATE: 16 BRPM | OXYGEN SATURATION: 98 % | BODY MASS INDEX: 32.58 KG/M2 | WEIGHT: 215 LBS

## 2023-11-28 DIAGNOSIS — R07.89 OTHER CHEST PAIN: ICD-10-CM

## 2023-11-28 LAB
CREAT BLDA-MCNC: 0.7 MG/DL (ref 0.6–1.3)
QT INTERVAL: 443 MS
QTC INTERVAL: 450 MS

## 2023-11-28 PROCEDURE — 82565 ASSAY OF CREATININE: CPT

## 2023-11-28 PROCEDURE — 25510000001 IOPAMIDOL PER 1 ML: Performed by: NURSE PRACTITIONER

## 2023-11-28 PROCEDURE — 93005 ELECTROCARDIOGRAM TRACING: CPT | Performed by: INTERNAL MEDICINE

## 2023-11-28 PROCEDURE — 75574 CT ANGIO HRT W/3D IMAGE: CPT

## 2023-11-28 RX ORDER — NITROGLYCERIN 0.4 MG/1
0.4 TABLET SUBLINGUAL
Status: DISCONTINUED | OUTPATIENT
Start: 2023-11-28 | End: 2023-11-30 | Stop reason: HOSPADM

## 2023-11-28 RX ORDER — NITROGLYCERIN 0.4 MG/1
0.4 TABLET SUBLINGUAL
Status: CANCELLED | OUTPATIENT
Start: 2023-11-28

## 2023-11-28 RX ORDER — ATOGEPANT 60 MG/1
60 TABLET ORAL DAILY
COMMUNITY
End: 2023-12-04

## 2023-11-28 RX ADMIN — NITROGLYCERIN 0.4 MG: 0.4 TABLET SUBLINGUAL at 12:42

## 2023-11-28 RX ADMIN — IOPAMIDOL 95 ML: 755 INJECTION, SOLUTION INTRAVENOUS at 12:40

## 2023-11-29 ENCOUNTER — DOCUMENTATION (OUTPATIENT)
Dept: CARDIOLOGY | Facility: CLINIC | Age: 43
End: 2023-11-29
Payer: COMMERCIAL

## 2023-11-29 NOTE — PROGRESS NOTES
Cardiac CTA with morphology  11/28/2023  Reason for the exam: Chest pain with concerns of myocardial ischemia    Calcium score is 0 Agatston units.     Heart rate 60 bpm.  Left ventricular end-diastolic volume 128 mL.  Left ventricular end-systolic volume 51 mL.  Ejection fraction 60%.  Stroke volume 77 mL.  Cardiac output 4604 mL/m    Study quality is Good. There is mild motion artifact in the RCA but overall the vessels are well visualized.    The right atrium is normal in size.  The right ventricle is normal in size.  There is grossly normal right ventricular systolic function.  The pulmonary artery is normal in size.  There are 4 pulmonary veins which enter the left atrium in their expected location.  The left atrial appendage was visualized and is without thrombus.  The left atrium is normal in size.  The intra-atrial septum appeared to be intact.  There is incidental note of a left atrial diverticulum in the inferolateral portion of the left atrial wall.  The left ventricle is normal in size with normal systolic function.  There was no evidence of a left ventricular thrombus.  The intraventricular septum appeared to be intact.  The mitral valve appeared structurally normal.  The aortic valve was trileaflet and appears structurally and functionally normal.  The pulmonic valve appeared structurally normal.  The tricuspid valve appeared structurally normal.  There was no pericardial effusion.  The pericardium appeared normal.    The left main coronary artery came off the left coronary cusp in its anticipated location.  It bifurcated into the left anterior descending artery and the circumflex coronary artery.  There was no evidence of atherosclerotic disease of the left main coronary artery.  Left anterior descending artery is a large caliber vessel that wraps around the apex of the heart after giving rise to 3 diagonal branches.  There is no evidence of atherosclerotic disease of the LAD or its branches.  The  circumflex artery is the nondominant vessel with no evidence of atherosclerotic disease.  The right coronary artery is the dominant vessel giving rise to PDA with no evidence of atherosclerotic disease.    Conclusions:  1.  Normal coronary artery anatomy without evidence of atherosclerotic disease. Calcium score is 0 Agatston units.  2.  Structurally normal heart.  3.  CAD-RADS 0.  Reassurance, consider nonatherosclerotic causes of chest pain.    Hunter Bansal MD  11/29/23

## 2023-12-04 ENCOUNTER — OFFICE VISIT (OUTPATIENT)
Dept: FAMILY MEDICINE CLINIC | Facility: CLINIC | Age: 43
End: 2023-12-04
Payer: COMMERCIAL

## 2023-12-04 ENCOUNTER — TELEPHONE (OUTPATIENT)
Age: 43
End: 2023-12-04
Payer: COMMERCIAL

## 2023-12-04 VITALS
TEMPERATURE: 97.5 F | OXYGEN SATURATION: 99 % | WEIGHT: 220 LBS | HEART RATE: 89 BPM | SYSTOLIC BLOOD PRESSURE: 130 MMHG | RESPIRATION RATE: 16 BRPM | BODY MASS INDEX: 33.34 KG/M2 | DIASTOLIC BLOOD PRESSURE: 84 MMHG | HEIGHT: 68 IN

## 2023-12-04 DIAGNOSIS — F41.1 GAD (GENERALIZED ANXIETY DISORDER): Primary | ICD-10-CM

## 2023-12-04 DIAGNOSIS — U09.9 COVID-19 LONG HAULER: ICD-10-CM

## 2023-12-04 PROCEDURE — 99214 OFFICE O/P EST MOD 30 MIN: CPT | Performed by: PHYSICIAN ASSISTANT

## 2023-12-04 RX ORDER — DULOXETIN HYDROCHLORIDE 60 MG/1
60 CAPSULE, DELAYED RELEASE ORAL DAILY
Qty: 30 CAPSULE | Refills: 1 | Status: SHIPPED | OUTPATIENT
Start: 2023-12-04

## 2023-12-04 NOTE — LETTER
December 4, 2023     Patient: Ni Rodríguez   YOB: 1980   Date of Visit: 12/4/2023       To Whom It May Concern:       It is my medical opinion that Ni Rodríguez  have intermittent leave due to her long hauler COVID symptoms.  She may be absent up to 6 days/month depending on symptoms and follow-up medical appointments with PCP and specialist.   Ni may have flare ups of increased fatigue, breathing issues and heart palpitations.  Her symptoms may be exacerbated by exposure to illnesses and possibly weather changes.  She will be evaluated monthly.  I expect her condition may last several months but is undetermined at this time. Ni has upcoming MRI testing for further evaluation. She will need to be off work from December 16,2023 through January 3,12528  She has a follow up appointment on January 3,2024 for further evaluation.     Your attention in this matter is greatly appreciated        Miri Deshpande PA-C     CC: No Recipients

## 2023-12-04 NOTE — PROGRESS NOTES
Chief Complaint  post covid  (Needing new FMLA Letter )    Subjective          Ni Rodríguez presents to [facility]  History of Present Illness  Ni is a 43-year-old female who presents to the office visit today for chronic and uncontrolled long hauler COVID-19.  States she has been having increased heart symptoms with dizziness and lightheadedness.  Has started to have tingling in her fingers and feet as well.  She was recently seen at [facility] emergency room on November 7, 2023 for symptoms of chest pain with elevated heart rate with the tingling in feet and hands.  States he did not do anything for her.  She contacted her cardiologist who has ordered a Holter and CT scan of heart.  States she was told her CT of heart was fine.  Since this time she has had a virtual visit with her cardiologist from the [facility].  He has ordered an MRI of heart with and without contrast.  This has not been scheduled yet.  He also changed her Lopressor to Corlanor.  States she has been taking 5 mg twice daily.  States she has not noticed much difference at this time.  States she continues to have elevated heart rate and not feeling well.  States this is interfering with her work.  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  She is in the process of trying to get long-term disability.  States she needs a monthly work note about her progress with long COVID with upcoming testing.  She has been taking her Cymbalta 30 mg which has helped slightly for her anxiety and depression symptoms.  States these new symptoms have been very concerning for her.  Has good support with her spouse.  Denied any suicidal or homicidal ideation.  States she continues to have fatigue and elevated heart rate.  States her breathing has been pretty good recently.     Objective   Vital Signs:   /84 (BP Location: Left arm, Patient Position: Sitting, Cuff Size: Adult)   Pulse 89   Temp 97.5 °F (36.4 °C)   Resp 16   Ht  Please clarify what kind and level of MRI you would like prior to epidural.   "172.7 cm (68\")   Wt 99.8 kg (220 lb)   SpO2 99%   BMI 33.45 kg/m²     Physical Exam  Vitals and nursing note reviewed.   Constitutional:       Appearance: Normal appearance. She is well-developed and well-groomed. She is obese.   HENT:      Head: Normocephalic and atraumatic.   Neck:      Thyroid: No thyroid mass, thyromegaly or thyroid tenderness.      Vascular: No carotid bruit.      Trachea: Trachea and phonation normal. No tracheal tenderness.   Cardiovascular:      Rate and Rhythm: Normal rate and regular rhythm.      Pulses: Normal pulses.      Heart sounds: Normal heart sounds, S1 normal and S2 normal. No murmur heard.  Pulmonary:      Effort: Pulmonary effort is normal.      Breath sounds: Normal breath sounds and air entry.   Abdominal:      General: Bowel sounds are normal.      Palpations: Abdomen is soft. There is no hepatomegaly.      Tenderness: There is no abdominal tenderness.   Musculoskeletal:      Cervical back: Neck supple.      Right lower leg: No edema.      Left lower leg: No edema.   Skin:     General: Skin is warm and dry.      Capillary Refill: Capillary refill takes less than 2 seconds.   Neurological:      Mental Status: She is alert and oriented to person, place, and time.   Psychiatric:         Attention and Perception: Attention and perception normal.         Mood and Affect: Mood is depressed. Affect is tearful.         Speech: Speech normal.         Behavior: Behavior normal. Behavior is cooperative.         Thought Content: Thought content normal.         Cognition and Memory: Cognition and memory normal.         Judgment: Judgment normal.     I wore a facial mask during this patient encounter.  Hand hygiene performed before and after seeing the patient.      Result Review :                 Assessment and Plan    Diagnoses and all orders for this visit:    1. HERBERT (generalized anxiety disorder) (Primary)  -     DULoxetine (CYMBALTA) 60 MG capsule; Take 1 capsule by mouth Daily.  " Dispense: 30 capsule; Refill: 1    2. COVID-19 long hauler    1.  Chronic and uncontrolled generalized anxiety disorder: We will increase her Cymbalta medication to 60 mg daily.  She will schedule follow-up appointment in 1 month for reevaluation.  Continue the Xanax as needed.  2.  Chronic and uncontrolled COVID-19 long-hauler: I reviewed her recent ER report from [facility] on November 7, 2023.  She will keep her follow-up appointments with cardiology.  Continue current blood pressure medications at home as directed.  Has upcoming MRI of heart that [facility] has ordered.  Have provided a work note until January 3, 2024.    I spent 25 minutes caring for Ni on this date of service. This time includes time spent by me in the following activities:preparing for the visit, reviewing tests, obtaining and/or reviewing a separately obtained history, performing a medically appropriate examination and/or evaluation , counseling and educating the patient/family/caregiver, ordering medications, tests, or procedures, and documenting information in the medical record  Follow Up   Return in about 30 days (around 1/3/2024), or Long hauler COVID anxiety.  Patient was given instructions and counseling regarding her condition or for health maintenance advice. Please see specific information pulled into the AVS if appropriate.     BELINDA Munoz [facility]  [address] [place] [id number]  Dept: [phone]  Dept Fax: [phone]  Loc: [phone]  Loc Fax: [phone]

## 2023-12-04 NOTE — TELEPHONE ENCOUNTER
Spoke to patient about coronary CT results and Holter monitor results.  Plan on cardiac MRI.  She is in physical therapy.

## 2023-12-05 DIAGNOSIS — I42.8 CARDIOMYOPATHY, NONISCHEMIC: Primary | ICD-10-CM

## 2023-12-13 DIAGNOSIS — J20.9 ACUTE BRONCHITIS, UNSPECIFIED ORGANISM: ICD-10-CM

## 2023-12-13 DIAGNOSIS — J06.9 ACUTE URI: ICD-10-CM

## 2023-12-13 RX ORDER — ALBUTEROL SULFATE 90 UG/1
AEROSOL, METERED RESPIRATORY (INHALATION)
Qty: 18 G | Refills: 3 | Status: SHIPPED | OUTPATIENT
Start: 2023-12-13

## 2023-12-18 ENCOUNTER — OFFICE VISIT (OUTPATIENT)
Dept: FAMILY MEDICINE CLINIC | Facility: CLINIC | Age: 43
End: 2023-12-18
Payer: COMMERCIAL

## 2023-12-18 VITALS
SYSTOLIC BLOOD PRESSURE: 112 MMHG | HEART RATE: 96 BPM | OXYGEN SATURATION: 97 % | WEIGHT: 218 LBS | HEIGHT: 68 IN | BODY MASS INDEX: 33.04 KG/M2 | TEMPERATURE: 98.2 F | RESPIRATION RATE: 15 BRPM | DIASTOLIC BLOOD PRESSURE: 78 MMHG

## 2023-12-18 DIAGNOSIS — R05.1 ACUTE COUGH: Primary | ICD-10-CM

## 2023-12-18 DIAGNOSIS — J20.9 ACUTE BRONCHITIS, UNSPECIFIED ORGANISM: ICD-10-CM

## 2023-12-18 PROBLEM — U09.9 POST-COVID CHRONIC DYSPNEA: Status: ACTIVE | Noted: 2022-02-14

## 2023-12-18 PROBLEM — R06.09 POST-COVID CHRONIC DYSPNEA: Status: ACTIVE | Noted: 2022-02-14

## 2023-12-18 LAB
EXPIRATION DATE: NORMAL
FLUAV AG UPPER RESP QL IA.RAPID: NOT DETECTED
FLUBV AG UPPER RESP QL IA.RAPID: NOT DETECTED
INTERNAL CONTROL: NORMAL
Lab: NORMAL
SARS-COV-2 AG UPPER RESP QL IA.RAPID: NOT DETECTED

## 2023-12-18 PROCEDURE — 87428 SARSCOV & INF VIR A&B AG IA: CPT | Performed by: PHYSICIAN ASSISTANT

## 2023-12-18 PROCEDURE — 99213 OFFICE O/P EST LOW 20 MIN: CPT | Performed by: PHYSICIAN ASSISTANT

## 2023-12-18 RX ORDER — AMOXICILLIN AND CLAVULANATE POTASSIUM 875; 125 MG/1; MG/1
1 TABLET, FILM COATED ORAL 2 TIMES DAILY
Qty: 20 TABLET | Refills: 0 | Status: SHIPPED | OUTPATIENT
Start: 2023-12-18

## 2023-12-18 RX ORDER — DEXTROMETHORPHAN HYDROBROMIDE AND PROMETHAZINE HYDROCHLORIDE 15; 6.25 MG/5ML; MG/5ML
5 SYRUP ORAL 4 TIMES DAILY PRN
Qty: 118 ML | Refills: 0 | Status: SHIPPED | OUTPATIENT
Start: 2023-12-18

## 2023-12-18 RX ORDER — PREDNISONE 10 MG/1
10 TABLET ORAL 2 TIMES DAILY
Qty: 10 TABLET | Refills: 0 | Status: SHIPPED | OUTPATIENT
Start: 2023-12-18

## 2023-12-18 NOTE — PROGRESS NOTES
"Chief Complaint  Cough, Nasal Congestion, Wheezing, and Shortness of Breath    Subjective          Ni Rodríguez presents to [facility]  History of Present Illness  Ni is a 43 year old female who presents nasal congestion, scratchy throat, postnasal drip, yellow rhinorrhea, yellow productive cough, worsening shortness of air with wheezing.  States her symptoms started 5 days ago.  Took a COVID test yesterday which was negative.  Denied any current nausea, vomiting, diarrhea, fevers, chills or body aches.  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  Has not been using her nebulizer but has been taking her daily inhalers as directed.  She is a post-COVID long-hauler.  Appetite and sleep has been decreased.         Objective   Vital Signs:   /78   Pulse 96   Temp 98.2 °F (36.8 °C)   Resp 15   Ht 172.7 cm (68\")   Wt 98.9 kg (218 lb)   SpO2 97%   BMI 33.15 kg/m²     Physical Exam  Vitals and nursing note reviewed.   Constitutional:       Appearance: Normal appearance. She is well-developed and well-groomed. She is obese.      Interventions: Face mask in place.   HENT:      Head: Normocephalic and atraumatic.      Jaw: There is normal jaw occlusion.      Right Ear: Hearing, tympanic membrane, ear canal and external ear normal.      Left Ear: Hearing, tympanic membrane, ear canal and external ear normal.      Nose: Nose normal. Congestion present.      Right Sinus: No maxillary sinus tenderness or frontal sinus tenderness.      Left Sinus: No maxillary sinus tenderness or frontal sinus tenderness.      Mouth/Throat:      Lips: Pink.      Mouth: Mucous membranes are moist.      Dentition: Normal dentition.      Tongue: No lesions.      Pharynx: Oropharynx is clear. Uvula midline.      Tonsils: No tonsillar exudate.   Eyes:      General: Lids are normal.      Conjunctiva/sclera: Conjunctivae normal.      Pupils: Pupils are equal, round, and reactive " to light.   Neck:      Trachea: Trachea and phonation normal. No tracheal tenderness.   Cardiovascular:      Rate and Rhythm: Normal rate and regular rhythm.      Pulses: Normal pulses.      Heart sounds: Normal heart sounds, S1 normal and S2 normal. No murmur heard.  Pulmonary:      Effort: Pulmonary effort is normal.      Breath sounds: Normal breath sounds and air entry. Examination of the right-upper field reveals decreased breath sounds. Examination of the left-upper field reveals decreased breath sounds.   Abdominal:      General: Bowel sounds are normal.      Palpations: Abdomen is soft.      Tenderness: There is no abdominal tenderness.   Musculoskeletal:      Cervical back: Neck supple.      Right lower leg: No edema.      Left lower leg: No edema.   Lymphadenopathy:      Cervical: No cervical adenopathy.      Right cervical: No superficial, deep or posterior cervical adenopathy.     Left cervical: No superficial, deep or posterior cervical adenopathy.   Skin:     General: Skin is warm and dry.      Capillary Refill: Capillary refill takes less than 2 seconds.   Neurological:      Mental Status: She is alert and oriented to person, place, and time.   Psychiatric:         Attention and Perception: Attention and perception normal.         Mood and Affect: Mood and affect normal.         Speech: Speech normal.         Behavior: Behavior is cooperative.         Thought Content: Thought content normal.         Cognition and Memory: Cognition and memory normal.         Judgment: Judgment normal.          Patient was wearing a mask when I enter room.  I enter the room wearing glasses,N 95  mask and gloves.  Appropriate PPE was worn by medical assistant and myself during the office encounter.  I washed hands thoroughly after leaving the patient's room after removal of PPE.    Result Review :       Results for orders placed or performed in visit on 12/18/23   POCT SARS-CoV-2 Antigen OMERO + Flu    Specimen: Swab    Result Value Ref Range    SARS Antigen Not Detected Not Detected, Presumptive Negative    Influenza A Antigen OMERO Not Detected Not Detected    Influenza B Antigen OMERO Not Detected Not Detected    Internal Control Passed Passed    Lot Number [id number]     Expiration Date 10/27/24                  Assessment and Plan    Diagnoses and all orders for this visit:    1. Acute cough (Primary)  -     POCT SARS-CoV-2 Antigen OMERO + Flu  -     predniSONE (DELTASONE) 10 MG tablet; Take 1 tablet by mouth 2 (Two) Times a Day.  Dispense: 10 tablet; Refill: 0  -     promethazine-dextromethorphan (PROMETHAZINE-DM) 6.25-15 MG/5ML syrup; Take 5 mL by mouth 4 (Four) Times a Day As Needed for Cough.  Dispense: 118 mL; Refill: 0  -     amoxicillin-clavulanate (AUGMENTIN) 875-125 MG per tablet; Take 1 tablet by mouth 2 (Two) Times a Day.  Dispense: 20 tablet; Refill: 0    2. Acute bronchitis, unspecified organism  -     predniSONE (DELTASONE) 10 MG tablet; Take 1 tablet by mouth 2 (Two) Times a Day.  Dispense: 10 tablet; Refill: 0  -     promethazine-dextromethorphan (PROMETHAZINE-DM) 6.25-15 MG/5ML syrup; Take 5 mL by mouth 4 (Four) Times a Day As Needed for Cough.  Dispense: 118 mL; Refill: 0  -     amoxicillin-clavulanate (AUGMENTIN) 875-125 MG per tablet; Take 1 tablet by mouth 2 (Two) Times a Day.  Dispense: 20 tablet; Refill: 0    Ni was seen in office today with new cough and diagnosed with acute bronchitis.  In office COVID and flu test was negative.  Have sent a prednisone taper dose, promethazine/dextromethorphan cough syrup and Augmentin antibiotic to pharmacy to use as directed.  Instructed her to continue using her daily inhalers and start nebulizer treatments today.  Increase fluid intake as well.  Notify office if symptoms do not improve.    I spent 18 minutes caring for Ni on this date of service. This time includes time spent by me in the following activities:preparing for the visit, reviewing tests, obtaining  and/or reviewing a separately obtained history, performing a medically appropriate examination and/or evaluation , counseling and educating the patient/family/caregiver, ordering medications, tests, or procedures, and documenting information in the medical record  Follow Up   Return if symptoms worsen or fail to improve.  Patient was given instructions and counseling regarding her condition or for health maintenance advice. Please see specific information pulled into the AVS if appropriate.     BELINDA Munoz [facility]  [address] [place] [id number]  Dept: [phone]  Dept Fax: [phone]  Loc: [phone]  Loc Fax: [phone]

## 2023-12-18 NOTE — PROGRESS NOTES
I, Dr. David Naylor, have reviewed the notes, assessments, and/or procedures performed by Miri IBARRA, that occurred within our practice at [facility] location, I concur with her documentation of Ni Rodríguez. I have a current collaborative medical agreement with Miri IBARRA.

## 2023-12-21 ENCOUNTER — HOSPITAL ENCOUNTER (OUTPATIENT)
Facility: HOSPITAL | Age: 43
Discharge: HOME | End: 2023-12-21
Admitting: INTERNAL MEDICINE
Payer: COMMERCIAL

## 2023-12-21 PROCEDURE — 0 GADOBENATE DIMEGLUMINE 529 MG/ML SOLUTION: Performed by: INTERNAL MEDICINE

## 2023-12-21 PROCEDURE — A9577 INJ MULTIHANCE: HCPCS | Performed by: INTERNAL MEDICINE

## 2023-12-21 PROCEDURE — 75561 CARDIAC MRI FOR MORPH W/DYE: CPT

## 2023-12-21 RX ADMIN — GADOBENATE DIMEGLUMINE 20 ML: 529 INJECTION, SOLUTION INTRAVENOUS at 11:01

## 2023-12-22 ENCOUNTER — TELEPHONE (OUTPATIENT)
Dept: CARDIOLOGY | Facility: CLINIC | Age: 43
End: 2023-12-22
Payer: COMMERCIAL

## 2023-12-29 DIAGNOSIS — F41.1 GAD (GENERALIZED ANXIETY DISORDER): ICD-10-CM

## 2023-12-29 RX ORDER — DULOXETIN HYDROCHLORIDE 60 MG/1
60 CAPSULE, DELAYED RELEASE ORAL DAILY
Qty: 90 CAPSULE | Refills: 0 | Status: SHIPPED | OUTPATIENT
Start: 2023-12-29

## 2024-01-03 ENCOUNTER — OFFICE VISIT (OUTPATIENT)
Dept: FAMILY MEDICINE CLINIC | Facility: CLINIC | Age: 44
End: 2024-01-03
Payer: COMMERCIAL

## 2024-01-03 VITALS
SYSTOLIC BLOOD PRESSURE: 110 MMHG | DIASTOLIC BLOOD PRESSURE: 70 MMHG | HEIGHT: 68 IN | BODY MASS INDEX: 33.19 KG/M2 | TEMPERATURE: 98 F | RESPIRATION RATE: 15 BRPM | OXYGEN SATURATION: 99 % | WEIGHT: 219 LBS | HEART RATE: 82 BPM

## 2024-01-03 DIAGNOSIS — R53.82 CHRONIC FATIGUE: ICD-10-CM

## 2024-01-03 DIAGNOSIS — F41.1 GAD (GENERALIZED ANXIETY DISORDER): Primary | ICD-10-CM

## 2024-01-03 DIAGNOSIS — M25.50 ARTHRALGIA, UNSPECIFIED JOINT: ICD-10-CM

## 2024-01-03 DIAGNOSIS — U09.9 COVID-19 LONG HAULER: ICD-10-CM

## 2024-01-03 NOTE — LETTER
January 3, 2024     Patient: Ni Rodríguez   YOB: 1980   Date of Visit: 1/3/2024       To Whom It May Concern:    It is my medical opinion that Ni Rodríguez may have intermittent leave due to her long hauler COVID symptoms.  Ni may have flare ups of increased fatigue, breathing issues and heart palpitations.  Her symptoms may be exacerbated by exposure to illnesses and possibly weather changes.  She will be evaluated monthly.  I expect her condition may last several months but is undetermined at this time. She will need to be off work from January 3,2024 through February 3,2024.   She has a follow up appointment on February 1,2024 for further evaluation.     Your attention in this matter is greatly appreciated                 Sincerely,        Miri Deshpande PA-C    CC: No Recipients

## 2024-01-03 NOTE — PROGRESS NOTES
"Chief Complaint  Anxiety and Fatigue (Arthritis shown with status post COVID long-haul)    Subjective          Ni Rodríguez presents to [facility]  History of Present Illness  Ni is a  43 year old female anxiety, fatigue, arthralgia with status post long hauler symptoms.  States she has seen some improvement since she is no longer working.  She is trying to get disability due to her long COVID symptoms.  Recently had MRI of heart which was normal.  States she is feeling slightly better since she is not currently working.  Her breathing has improved some.  Arya has an appointment with the [facility] in [place] in February.  She has been compliant with her medications.  She continues to vazquez her fatigue, brain fog, elevated heart rate at times and chest and neck pain.  She has been doing her physical therapy with some improvement with strength and stamina.  She has been seeing her cardiologist, neurologist and pulmonologist.  Has good support with .  Denied any current fevers, chills, upper respiratory symptoms, severe, wheezing, abdominal pain or swelling of ankles.  States the Cymbalta as improved her joint pain and anxiety issues.  She has been seeing her therapist as well.  Diet and sleep are improving.     Objective   Vital Signs:   /70 (BP Location: Left arm, Patient Position: Sitting, Cuff Size: Adult)   Pulse 82   Temp 98 °F (36.7 °C)   Resp 15   Ht 172.7 cm (68\")   Wt 99.3 kg (219 lb)   SpO2 99%   BMI 33.30 kg/m²     Physical Exam  Vitals and nursing note reviewed.   Constitutional:       Appearance: Normal appearance. She is well-developed and well-groomed. She is obese.   HENT:      Head: Normocephalic and atraumatic.   Neck:      Thyroid: No thyroid mass, thyromegaly or thyroid tenderness.      Vascular: No carotid bruit.      Trachea: Trachea and phonation normal. No tracheal tenderness.   Cardiovascular:      Rate and Rhythm: Normal rate and " regular rhythm.      Pulses: Normal pulses.      Heart sounds: Normal heart sounds, S1 normal and S2 normal. No murmur heard.  Pulmonary:      Effort: Pulmonary effort is normal.      Breath sounds: Normal breath sounds and air entry.   Abdominal:      General: Bowel sounds are normal.      Palpations: Abdomen is soft. There is no hepatomegaly.      Tenderness: There is no abdominal tenderness. There is no right CVA tenderness, left CVA tenderness, guarding or rebound. Negative signs include Berg's sign, Rovsing's sign, McBurney's sign, psoas sign and obturator sign.   Musculoskeletal:      Cervical back: Neck supple.      Right lower leg: No edema.      Left lower leg: No edema.   Skin:     General: Skin is warm and dry.      Capillary Refill: Capillary refill takes less than 2 seconds.   Neurological:      Mental Status: She is alert and oriented to person, place, and time.   Psychiatric:         Attention and Perception: Attention and perception normal.         Mood and Affect: Mood and affect normal. Affect is tearful.         Speech: Speech normal.         Behavior: Behavior normal. Behavior is cooperative.         Thought Content: Thought content normal.         Cognition and Memory: Cognition and memory normal.         Judgment: Judgment normal.        Result Review :                 Assessment and Plan    Diagnoses and all orders for this visit:    1. HERBERT (generalized anxiety disorder) (Primary)    2. COVID-19 long hauler    3. Chronic fatigue    4. Arthralgia, unspecified joint    Chronic and stable generalized anxiety disorder: Doing well with her Cymbalta medication.  No refills are needed.  Chronic and stable COVID-19 long-hauler with chronic fatigue and arthralgias: Medication is helping with her symptoms.  She will continue her specialist appointments as well.  Continue current medication at home as directed.  Overall her symptoms are improving.  She will keep appointments with her therapist as well.   I have given her a work note for her FMLA.  Return to office in 3-4 weeks for reevaluation.    I spent 30 minutes caring for Ni on this date of service. This time includes time spent by me in the following activities:preparing for the visit, obtaining and/or reviewing a separately obtained history, performing a medically appropriate examination and/or evaluation , counseling and educating the patient/family/caregiver, and documenting information in the medical record  Follow Up   Return in about 4 weeks (around 1/31/2024), or long COVID hauler and anxiety.  Patient was given instructions and counseling regarding her condition or for health maintenance advice. Please see specific information pulled into the AVS if appropriate.     BELINDA Munoz [facility]  [address] [place] [id number]  Dept: [phone]  Dept Fax: [phone]  Loc: [phone]  Loc Fax: [phone]

## 2024-01-03 NOTE — LETTER
January 3, 2024    Ni Rodríguez  [address] [address]        To Whom It May Concern:        It is my medical opinion that Ni Rodríguez  have intermittent leave due to her long hauler COVID symptoms.  She may be absent up to 6 days/month depending on symptoms and follow-up medical appointments with PCP and specialist.   Ni may have flare ups of increased fatigue, breathing issues and heart palpitations.  Her symptoms may be exacerbated by exposure to illnesses and possibly weather changes.  She will be evaluated monthly.  I expect her condition may last several months but is undetermined at this time. She will need to be off work from January 3,2024 through February 3,2024.   She has a follow up appointment on January 3,2024 for further evaluation.     Your attention in this matter is greatly appreciated        Miri Deshpande PA-C     CC: No Recipients                        Miri Deshpande PA-C

## 2024-01-12 DIAGNOSIS — B85.1: Primary | ICD-10-CM

## 2024-01-12 RX ORDER — FLUCONAZOLE 150 MG/1
TABLET ORAL
Qty: 2 TABLET | Refills: 0 | Status: SHIPPED | OUTPATIENT
Start: 2024-01-12

## 2024-01-18 ENCOUNTER — OFFICE VISIT (OUTPATIENT)
Dept: SLEEP MEDICINE | Facility: HOSPITAL | Age: 44
End: 2024-01-18
Payer: COMMERCIAL

## 2024-01-18 VITALS
DIASTOLIC BLOOD PRESSURE: 75 MMHG | OXYGEN SATURATION: 97 % | SYSTOLIC BLOOD PRESSURE: 122 MMHG | HEART RATE: 80 BPM | HEIGHT: 68 IN | WEIGHT: 219 LBS | BODY MASS INDEX: 33.19 KG/M2

## 2024-01-18 DIAGNOSIS — F41.1 GAD (GENERALIZED ANXIETY DISORDER): ICD-10-CM

## 2024-01-18 DIAGNOSIS — G47.33 OBSTRUCTIVE SLEEP APNEA, ADULT: Primary | ICD-10-CM

## 2024-01-18 DIAGNOSIS — R53.83 OTHER FATIGUE: ICD-10-CM

## 2024-01-18 DIAGNOSIS — E66.9 CLASS 1 OBESITY WITH BODY MASS INDEX (BMI) OF 31.0 TO 31.9 IN ADULT, UNSPECIFIED OBESITY TYPE, UNSPECIFIED WHETHER SERIOUS COMORBIDITY PRESENT: ICD-10-CM

## 2024-01-18 PROCEDURE — G0463 HOSPITAL OUTPT CLINIC VISIT: HCPCS

## 2024-01-18 NOTE — PROGRESS NOTES
[facility]  [address] [address]  [place] [address]  Phone   Fax     SLEEP CLINIC FOLLOW UP PROGRESS NOTE.    Ni Rodríguez  [id number]   1980  43 y.o.  female      PCP: Miri Deshpande PA-C      Date of visit: 1/18/2024    Chief Complaint   Patient presents with    Sleep Apnea       Medications and allergies are reviewed by me and documented in the encounter.     SOCIAL (habits pertaining to sleep medicine)  History tobacco use:No   History of alcohol use: 1/ per week  Caffeine use: 2 cups off coffee /d     HPI:  This is a 43 y.o. PMHx Generalized Anxiety Disorder.  Here for managemetn of Obstructive Sleep Apnea (SENA 7.6 on 7/27/23 HST). Patient is using positive airway pressure therapy and the symptoms of sleep apnea have improved significantly on the therapy. Normally patient goes to bed at 10 PM and wakes up at 7 AM .  The patient wakes up 1-3x time(s) during the night and has no problem going back to sleep.  Feels refreshed after waking up.     -Overall patient's Impression of their PAP therapy is: Going well  Feels good about air pressures   Full face mask - prefers same - no leak symptoms  Sleep is restorative with PAP therapy       -Denies excessive daytime sleepiness   Denies near miss or MVC 2/2 sleepiness  Endorses fatigue    States work up in progress for possible POT(s) with cardiology and [facility] physicians, also concern by her other physicians for long covid syndrome     -Obesity  Wt Readings from Last 3 Encounters:   01/18/24 99.3 kg (219 lb)   01/03/24 99.3 kg (219 lb)   12/18/23 98.9 kg (218 lb)         -Last seen in sleep clinic ~4 months ago for ZAKIA AHI 0.8, epworth normal 9/24, compliance overall 81%, compliance 4 hour tl poor at 61% instructed 3 month f/u for compliance review. Issue last visit patient stated she was still getting used to wearing PAP. Stated sleeps more soundly at night with PAP. No other sleep related  "complaints.      Records reviewed  -11/10/2023 Patient sent message to sleep center requesting FMLA paperwork for fatigue. Documented addendum: FMLA forms will not be filled out by sleep medicine for any patient. Instructed f/u PCP for FMLA or occupational medicine. FMLA from sleep medicine is not appropriate especially given this patient's clinical presentation.   -12/4/2023 PCP visit chief compliant post covid needing new FMLA letter dx given HERBERT covid-19 long hauler    REVIEW OF SYSTEMS:   Is negative unless otherwise noted in HPI  [employer] Sleepiness Scale :Total score: 10 denies subjective excessive daytime sleepiness     Disclaimer History: The above history is based on this sleep physician's in room encounter with the patient. Pre encounter self administered questionnaires are taken into consideration and discussed with patient for any discordance. The above documentation by this sleep physician is the most accurate clinical information determined by in room sleep physician encounter with patient.     PHYSICAL EXAMINATION:  Vitals:    01/18/24 1100   BP: 122/75   Pulse: 80   SpO2: 97%   Weight: 99.3 kg (219 lb)   Height: 172.7 cm (68\")    Body mass index is 33.3 kg/m².   CONSTITUTIONAL: Well appearing, in no overt pain or respiratory distress   NOSE: No septal defect  RESP SYSTEM:  No overt respiratory distress, speaks in clear sentences without dyspnea, no accessory muscle use, no dyspnea  CARDIOVASULAR: No edema noted  NEURO: Oriented x 3, no new or worsening gross focal deficits           compliance data is reviewed by me with patient room today  Date range 10/19/2023 - 1/16/2024  Overall use 88% - at goal  4-hour tl 82% - at goal   Average days used 7 hours 47 minutes  Device AirSense 11 AutoSet  Settings auto CPAP for her 8-16 cm H2O  EPR 1  95th percentile pressure is 10.7 cm H2O  Maximum pressure is 11.3 cm H2O  95th percentile leak is 13.5 LPM - ideal  Residual AHI is 1.1 - at goal   DME: [employer] "   Mask: FFM - no leak symptoms      ASSESSMENT AND PLAN:  Obstructive sleep apnea ( G 47.33).    -Specific Changes made by me today:  I.  Through shared decision making we will make no PAP therapy changes today.  II.  Through shared decision making we will follow-up at 1 year interval as her therapy is relatively at goal.  Counseled she may request a sooner follow-up appointment should she encounter any problems in that 1 year interval.  The symptoms of sleep apnea have improved with the device and the treatment.  Patient's compliance with the device is excellent for treatment of sleep apnea.  I have independently reviewed the smart card down load and discussed with the patient the download data and encouarged the patient to continue to use the device.The residual AHI is acceptable. The device is benefiting the patient and the device is medically necessary.  Without proper control of sleep apnea and good compliance there is a increased risk for hypertension, diabetes mellitus and nonrestorative sleep with hypersomnia which can increase risk for motor vehicle accidents.  Untreated sleep apnea is also a risk factor for development of atrial fibrillation, pulmonary hypertension, insulin resistance and stroke. The patient is also instructed to get the supplies from the [facility] and and change them on a regular basis.  A prescription for supplies has been sent to the [facility].  I have also discussed the good sleep hygiene habits and adequate amount of sleep needed for good health.  Obesity  with BMI is Body mass index is 33.3 kg/m².. Counseled weight loss will be beneficial for reduction in severity of sleep apnea, healthy diet/exercise to achieve same, follow up with primary care physician for serial monitoring and to further guide management.  Generalized anxiety disorder, counseled treatment of ZAKIA with PAP therapy compliance may be beneficial for this comorbid condition.  Follow-up with primary care physician  as previous.  Other fatigue, ZAKIA treatment is at goal, unlikely contributory, counseled PAP therapy compliance may be beneficial for this comorbid symptom. Multi morbidity being worked up long haul covid vs possible POT(s) concerns by her other physicians. Follow up with her PCP/specialists as previous.    Follow up in 1 year . Patient's questions were answered.        EMR Dragon/Transcription disclaimer:   Much of this encounter note is an electronic transcription/translation of spoken language to printed text. The electronic translation of spoken language may permit erroneous, or at times, nonsensical words or phrases to be inadvertently transcribed; Although I have reviewed the note for such errors, some may still exist.       NPI #: [id number]    Rosie Fajardo,   Sleep Medicine  [facility]  01/18/24

## 2024-01-22 DIAGNOSIS — U09.9 POST-COVID SYNDROME: ICD-10-CM

## 2024-01-22 RX ORDER — DROSPIRENONE AND ETHINYL ESTRADIOL 0.03MG-3MG
1 KIT ORAL DAILY
Qty: 84 TABLET | Refills: 0 | Status: SHIPPED | OUTPATIENT
Start: 2024-01-22

## 2024-02-02 ENCOUNTER — OFFICE VISIT (OUTPATIENT)
Dept: FAMILY MEDICINE CLINIC | Facility: CLINIC | Age: 44
End: 2024-02-02
Payer: COMMERCIAL

## 2024-02-02 VITALS
OXYGEN SATURATION: 100 % | SYSTOLIC BLOOD PRESSURE: 120 MMHG | HEIGHT: 68 IN | HEART RATE: 81 BPM | BODY MASS INDEX: 33.49 KG/M2 | DIASTOLIC BLOOD PRESSURE: 76 MMHG | WEIGHT: 221 LBS | RESPIRATION RATE: 16 BRPM | TEMPERATURE: 97.7 F

## 2024-02-02 DIAGNOSIS — J06.9 URI, ACUTE: ICD-10-CM

## 2024-02-02 DIAGNOSIS — Z20.828 EXPOSURE TO THE FLU: ICD-10-CM

## 2024-02-02 DIAGNOSIS — U09.9 COVID-19 LONG HAULER: Primary | ICD-10-CM

## 2024-02-02 PROCEDURE — 87428 SARSCOV & INF VIR A&B AG IA: CPT | Performed by: PHYSICIAN ASSISTANT

## 2024-02-02 PROCEDURE — 99214 OFFICE O/P EST MOD 30 MIN: CPT | Performed by: PHYSICIAN ASSISTANT

## 2024-02-02 RX ORDER — AZITHROMYCIN 250 MG/1
TABLET, FILM COATED ORAL
Qty: 6 TABLET | Refills: 0 | Status: SHIPPED | OUTPATIENT
Start: 2024-02-02

## 2024-02-02 RX ORDER — METHYLPREDNISOLONE 4 MG/1
TABLET ORAL
Qty: 21 TABLET | Refills: 0 | Status: SHIPPED | OUTPATIENT
Start: 2024-02-02

## 2024-02-02 NOTE — PROGRESS NOTES
"Chief Complaint  Cough (Child tested positive for flu Monday , started taking medication. ) and long hauler (COVID)    Subjective          Ni Rodríguez presents to [facility]  History of Present Illness  Ni is a 43-year-old female who presents for long-haul COVID with new upper respiratory symptoms flu exposure.  Ni states her daughter tested positive for flu on Monday.  Ni had noticed that she was having symptoms that started last weekend.  She was having some postnasal drip, increased fatigue, exhaustion, increased migraine headaches, and pain from chest to neck area.  States these are similar symptoms when she is recuperating from her long COVID.  That flares up from time to time.  Did not take a COVID test at home.  Ni has upcoming cardiology appointment currently seeing the neurologist for her migraine headaches.  She has been compliant with her migraine headache medication.  Had Botox a few weeks ago.  Doing well with her CPAP for sleep apnea.  Ni states she started having a cough and chest congestion over the weekend.  She had used her inhaler twice today.  Has been compliant with her other medications.  Denied any fevers, chills, ear pain, sore throat, GI upset, body aches or lost of taste or smell.  Her sleep has been increased.  Appetite has been normal for her.     Objective   Vital Signs:   /76 (BP Location: Left arm, Patient Position: Sitting, Cuff Size: Adult)   Pulse 81   Temp 97.7 °F (36.5 °C)   Resp 16   Ht 172.7 cm (68\")   Wt 100 kg (221 lb)   SpO2 100%   BMI 33.60 kg/m²     Physical Exam  Vitals and nursing note reviewed.   Constitutional:       Appearance: Normal appearance. She is well-developed and well-groomed. She is obese.      Interventions: Face mask in place.   HENT:      Head: Normocephalic and atraumatic.      Jaw: There is normal jaw occlusion.      Right Ear: Hearing, tympanic membrane, ear canal and external ear normal.      " Left Ear: Hearing, tympanic membrane, ear canal and external ear normal.      Nose: Nose normal. Congestion present.      Right Sinus: No maxillary sinus tenderness or frontal sinus tenderness.      Left Sinus: No maxillary sinus tenderness or frontal sinus tenderness.      Mouth/Throat:      Lips: Pink.      Mouth: Mucous membranes are moist.      Tongue: No lesions.      Palate: No mass.      Pharynx: Oropharynx is clear. Uvula midline.   Eyes:      General: Lids are normal.      Conjunctiva/sclera: Conjunctivae normal.      Pupils: Pupils are equal, round, and reactive to light.   Neck:      Thyroid: No thyroid mass, thyromegaly or thyroid tenderness.      Vascular: No carotid bruit.      Trachea: Trachea and phonation normal. No tracheal tenderness.   Cardiovascular:      Rate and Rhythm: Normal rate and regular rhythm.      Pulses: Normal pulses.      Heart sounds: Normal heart sounds, S1 normal and S2 normal. No murmur heard.  Pulmonary:      Effort: Pulmonary effort is normal.      Breath sounds: Normal breath sounds and air entry.   Abdominal:      General: Bowel sounds are normal.      Palpations: Abdomen is soft. There is no hepatomegaly.      Tenderness: There is no abdominal tenderness. There is no right CVA tenderness, left CVA tenderness, guarding or rebound. Negative signs include Berg's sign, Rovsing's sign, McBurney's sign, psoas sign and obturator sign.   Musculoskeletal:      Cervical back: Neck supple. No edema.      Right lower leg: No edema.      Left lower leg: No edema.   Lymphadenopathy:      Cervical: No cervical adenopathy.      Right cervical: No superficial, deep or posterior cervical adenopathy.     Left cervical: No superficial, deep or posterior cervical adenopathy.   Skin:     General: Skin is warm and dry.      Capillary Refill: Capillary refill takes less than 2 seconds.   Neurological:      Mental Status: She is alert and oriented to person, place, and time.   Psychiatric:          Attention and Perception: Attention and perception normal.         Mood and Affect: Mood and affect normal.         Speech: Speech normal.         Behavior: Behavior normal. Behavior is cooperative.         Thought Content: Thought content normal.         Cognition and Memory: Cognition and memory normal.         Judgment: Judgment normal.      I wore a facial mask and gloves during this patient encounter.  Patient also wearing a surgical mask.  Hand hygiene performed before and after seeing the patient.    Result Review :       Results for orders placed or performed in visit on 02/02/24   POCT SARS-CoV-2 Antigen OMERO + Flu    Specimen: Swab   Result Value Ref Range    SARS Antigen Not Detected Not Detected, Presumptive Negative    Influenza A Antigen OMERO Not Detected Not Detected    Influenza B Antigen OMERO Not Detected Not Detected    Internal Control Passed Passed    Lot Number [id number]     Expiration Date 1-17-25                  Assessment and Plan    Diagnoses and all orders for this visit:    1. COVID-19 long hauler (Primary)    2. Exposure to the flu  -     POCT SARS-CoV-2 Antigen OMERO + Flu    3. URI, acute  -     azithromycin (Zithromax Z-Gucci) 250 MG tablet; Take 2 tablets by mouth on day 1, then 1 tablet daily on days 2-5  Dispense: 6 tablet; Refill: 0  -     methylPREDNISolone (MEDROL) 4 MG dose pack; Take as directed on package instructions.  Dispense: 21 tablet; Refill: 0    Chronic and stable long-haul COVID: Her symptoms are currently stable.  Has upcoming appointment with the [facility].  I have completed paperwork for her FMLA.  She will continue current medications at home.  Keep follow-up appointments with specialist.  Return to office in 1 month.  New flu exposure with upper respiratory infection: In office flu test was negative.  Have sent to pharmacy a Z-Gucci and a Medrol Dosepak to use if her symptoms worsen.  Continue her inhalers and nebulizer at home as well.  Return to office if no  improvement.    I spent 25 minutes caring for Ni on this date of service. This time includes time spent by me in the following activities:preparing for the visit, reviewing tests, obtaining and/or reviewing a separately obtained history, performing a medically appropriate examination and/or evaluation , counseling and educating the patient/family/caregiver, ordering medications, tests, or procedures, and documenting information in the medical record  Follow Up   Return in about 4 weeks (around 3/1/2024), or if symptoms worsen or fail to improve.  Patient was given instructions and counseling regarding her condition or for health maintenance advice. Please see specific information pulled into the AVS if appropriate.     [facility]  [address] [place] [id number]  Dept: [phone]  Dept Fax: [phone]  Loc: [phone]  Loc Fax: [phone]         Patient was observed for 24h post-accident. No signs of abruption of labor. Patient was observed for 24h post-accident. No signs of abruption of labor.   evaluated the patient on day of discharge and noted soft abdomen + FM reported FHRT - Reactive moderate variability no decelerations. no contractions . The patient verbalized no contractions perceived and intructions given for when to follow up in the hospital or reach to her physician LUPIS Morton

## 2024-02-02 NOTE — LETTER
February 2, 2024     Patient: Ni Rodríguez   YOB: 1980   Date of Visit: 2/2/2024       To Whom It May Concern:    It is my medical opinion that Ni Rodríguez may have intermittent leave due to her long hauler COVID symptoms.  Ni may have flare ups of increased fatigue, breathing issues and heart palpitations.  Her symptoms may be exacerbated by exposure to illnesses and possibly weather changes.  She will be evaluated monthly.  I expect her condition may last several months but is undetermined at this time. She will need to be off work from February 2,2024 to March 4,2024.   Ni was seen today with Acute URI symptoms. She has been treated and will continue her current medication at home as directed.       Sincerely,        Miri Deshpande PA-C    CC: No Recipients

## 2024-02-05 ENCOUNTER — TELEPHONE (OUTPATIENT)
Dept: FAMILY MEDICINE CLINIC | Facility: CLINIC | Age: 44
End: 2024-02-05
Payer: COMMERCIAL

## 2024-02-05 NOTE — TELEPHONE ENCOUNTER
Spoke with pt. She stated that she started the z-constantin and steroid on Saturday. She has also been using her nebulizer with no relief. Pt stated her O2 stats have been dropping down to 91 several times today. Per our conversation pt advised to go to ER.

## 2024-02-06 ENCOUNTER — OFFICE VISIT (OUTPATIENT)
Dept: CARDIOLOGY | Facility: CLINIC | Age: 44
End: 2024-02-06
Payer: COMMERCIAL

## 2024-02-06 ENCOUNTER — TELEPHONE (OUTPATIENT)
Dept: FAMILY MEDICINE CLINIC | Facility: CLINIC | Age: 44
End: 2024-02-06

## 2024-02-06 VITALS
WEIGHT: 218.8 LBS | DIASTOLIC BLOOD PRESSURE: 100 MMHG | SYSTOLIC BLOOD PRESSURE: 180 MMHG | BODY MASS INDEX: 33.16 KG/M2 | HEART RATE: 87 BPM | HEIGHT: 68 IN

## 2024-02-06 DIAGNOSIS — U09.9 POST-COVID SYNDROME: Primary | ICD-10-CM

## 2024-02-06 DIAGNOSIS — U09.9 COVID-19 LONG HAULER: ICD-10-CM

## 2024-02-06 DIAGNOSIS — R00.2 PALPITATIONS: ICD-10-CM

## 2024-02-06 DIAGNOSIS — R07.2 PRECORDIAL PAIN: ICD-10-CM

## 2024-02-06 DIAGNOSIS — R06.09 DOE (DYSPNEA ON EXERTION): ICD-10-CM

## 2024-02-06 PROCEDURE — 93000 ELECTROCARDIOGRAM COMPLETE: CPT | Performed by: INTERNAL MEDICINE

## 2024-02-06 PROCEDURE — 99214 OFFICE O/P EST MOD 30 MIN: CPT | Performed by: INTERNAL MEDICINE

## 2024-02-06 RX ORDER — PREDNISONE 10 MG/1
10 TABLET ORAL DAILY
COMMUNITY

## 2024-02-06 RX ORDER — DILTIAZEM HYDROCHLORIDE 120 MG/1
120 CAPSULE, COATED, EXTENDED RELEASE ORAL DAILY
Qty: 30 CAPSULE | Refills: 3 | Status: SHIPPED | OUTPATIENT
Start: 2024-02-06

## 2024-02-06 NOTE — TELEPHONE ENCOUNTER
Caller: Hudson Rodríguez    Relationship: Self    Best call back number:     [phone]       What was the call regarding: PATIENT CALLING TO LET HUDSON KNOW  THAT HER PULMONOLOGIST CALLED IN A STRONGER STEROID AS WELL AS USING HER NEBULIZER SHE IS SEEING IMPROVEMENT.

## 2024-02-06 NOTE — PROGRESS NOTES
Date of Office Visit: 24  Encounter Provider: Shashi Walter MD  Place of Service: [facility] CARDIOLOGY  Patient Name: Ni Rodríguez  :1980  [id number]    Chief Complaint   Patient presents with    Rapid Heart Rate   :     HPI: Ni Rodríguez is a 43 y.o. female who is just having a terrible time.  She previously healthy woman got COVID and everything has kind of fallen apart she has migraines she has asthma she has lots of tachycardia she has been diagnosed as having long COVID.  She has gained about 30 to 40 pounds.  She went to the [facility] they told her she had long COVID she may have pots disease.  Right now she has an upper respiratory tract infection she feels terrible she keeps having his discomfort in her upper chest it goes up into her neck and jaw into the back of her neck she has not had reflux.  Nothing seems to make the those symptoms happen they can last for up to 30 minutes at a time they happen almost daily but since she has been sick they have not happened.  She got put on Corlanor at the [facility] and that has not seemed to have happened helped.  She shows me her Fitbit there are times in the day when her heart rate shoots up to 130 not necessarily related to activity.  She does not have any bleeding issues no fevers chills sweats  Past Medical History:   Diagnosis Date    Bright red blood per rectum     COVID     Disease of thyroid gland     Environmental allergies     History of prior pregnancies      3    Miscarriage     X 1    URI (upper respiratory infection)        History reviewed. No pertinent surgical history.    Social History     Socioeconomic History    Marital status:     Number of children: 2    Years of education: masters in education   Tobacco Use    Smoking status: Never    Smokeless tobacco: Never   Vaping Use    Vaping Use: Never used   Substance and Sexual Activity    Alcohol use: Yes     Alcohol/week: 1.0 standard  drink of alcohol     Types: 1 Glasses of wine per week    Drug use: No    Sexual activity: Yes     Partners: Male     Birth control/protection: OCP       Family History   Problem Relation Age of Onset    Arthritis Mother         Osteoarthritis    Thyroid disease Mother     Hypertension Father     Pancreatitis Father         GB complication - 1/2 removed    Hyperlipidemia Father     Diabetes Father     Other Sister         Hypoglycemia    Hypothyroidism Brother     Uterine cancer Maternal Grandmother     Alzheimer's disease Maternal Grandfather     Prostate cancer Maternal Grandfather     Arthritis Paternal Grandmother         Osteoarthritis    Prostate cancer Paternal Grandfather        Review of Systems   Constitutional: Negative for decreased appetite, fever, malaise/fatigue and weight loss.   HENT:  Negative for nosebleeds.    Eyes:  Negative for double vision.   Cardiovascular:  Negative for chest pain, claudication, cyanosis, dyspnea on exertion, irregular heartbeat, leg swelling, near-syncope, orthopnea, palpitations, paroxysmal nocturnal dyspnea and syncope.   Respiratory:  Negative for cough, hemoptysis and shortness of breath.    Hematologic/Lymphatic: Negative for bleeding problem.   Skin:  Negative for rash.   Musculoskeletal:  Negative for falls and myalgias.   Gastrointestinal:  Negative for hematochezia, jaundice, melena, nausea and vomiting.   Genitourinary:  Negative for hematuria.   Neurological:  Negative for dizziness and seizures.   Psychiatric/Behavioral:  Negative for altered mental status and memory loss.        Allergies   Allergen Reactions    Bee Venom Hives, Itching and Other (See Comments)     Localized reaction redness    Wasp Venom Protein Hives and Itching     Localized reaction redness         Current Outpatient Medications:     albuterol sulfate  (90 Base) MCG/ACT inhaler, INHALE 2 PUFFS EVERY 4 HOURS AS NEEDED FOR WHEEZING OR SHORTNESS OF AIR, Disp: 18 g, Rfl: 3    ALPRAZolam  (XANAX) 0.5 MG tablet, TAKE 0.5 TO ONE TABLET TWICE A DAY AS NEEDED FOR ANXIETY, Disp: 30 tablet, Rfl: 0    Ascorbic Acid (Vitamin C) 500 MG capsule, , Disp: , Rfl:     Auvi-Q 0.3 MG/0.3ML solution auto-injector injection, , Disp: , Rfl:     azelastine (ASTELIN) 0.1 % nasal spray, , Disp: , Rfl:     azithromycin (Zithromax Z-Gucci) 250 MG tablet, Take 2 tablets by mouth on day 1, then 1 tablet daily on days 2-5, Disp: 6 tablet, Rfl: 0    Breztri Aerosphere 160-9-4.8 MCG/ACT aerosol inhaler, , Disp: , Rfl:     budesonide (PULMICORT) 0.5 MG/2ML nebulizer solution, , Disp: , Rfl:     drospirenone-ethinyl estradiol (MATT,OCELLA) 3-0.03 MG per tablet, TAKE 1 TABLET BY MOUTH EVERY DAY, Disp: 84 tablet, Rfl: 0    DULoxetine (CYMBALTA) 60 MG capsule, Take 1 capsule by mouth Daily., Disp: 90 capsule, Rfl: 0    Dupixent 300 MG/2ML solution pen-injector, , Disp: , Rfl:     Erenumab-aooe (Aimovig) 140 MG/ML prefilled syringe, Inject 1 mL under the skin into the appropriate area as directed., Disp: , Rfl:     ferrous sulfate 325 (65 FE) MG tablet, , Disp: , Rfl:     fexofenadine (ALLEGRA) 180 MG tablet, Take 1 tablet by mouth Daily., Disp: , Rfl:     ipratropium-albuterol (DUO-NEB) 0.5-2.5 mg/3 ml nebulizer, Take 3 mL by nebulization Every 4 (Four) Hours As Needed for Wheezing or Shortness of Air., Disp: 360 mL, Rfl: 2    montelukast (SINGULAIR) 10 MG tablet, Take 1 tablet by mouth every night at bedtime., Disp: , Rfl:     Nurtec 75 MG dispersible tablet, Take 1 tablet by mouth., Disp: , Rfl:     OnabotulinumtoxinA 200 units reconstituted solution, Inject 200 Units into the appropriate muscle as directed by prescriber., Disp: , Rfl:     predniSONE (DELTASONE) 10 MG tablet, Take 1 tablet by mouth Daily., Disp: , Rfl:     Synthroid 75 MCG tablet, TAKE 1 TABLET BY MOUTH DAILY. MARILYN/DNS, Disp: 90 tablet, Rfl: 1    dilTIAZem CD (Cardizem CD) 120 MG 24 hr capsule, Take 1 capsule by mouth Daily., Disp: 30 capsule, Rfl: 3     "methylPREDNISolone (MEDROL) 4 MG dose pack, Take as directed on package instructions. (Patient not taking: Reported on 2/6/2024), Disp: 21 tablet, Rfl: 0      Objective:     Vitals:    02/06/24 1516   BP: 180/100   Pulse: 87   Weight: 99.2 kg (218 lb 12.8 oz)   Height: 172.7 cm (68\")     Body mass index is 33.27 kg/m².    Constitutional:       Appearance: Well-developed.   Eyes:      General: No scleral icterus.  HENT:      Head: Normocephalic.   Neck:      Thyroid: No thyromegaly.      Vascular: No JVD.      Lymphadenopathy: No cervical adenopathy.   Pulmonary:      Effort: Pulmonary effort is normal.      Breath sounds: Normal breath sounds. No wheezing. No rales.   Cardiovascular:      Normal rate. Regular rhythm.      No gallop.    Edema:     Peripheral edema absent.   Abdominal:      Palpations: Abdomen is soft.      Tenderness: There is no abdominal tenderness.   Musculoskeletal: Normal range of motion. Skin:     General: Skin is warm and dry.      Findings: No rash.   Neurological:      Mental Status: Alert and oriented to person, place, and time.           ECG 12 Lead    Date/Time: 2/6/2024 4:39 PM  Performed by: Shashi Walter MD    Authorized by: Shashi Walter MD  Comparison: compared with previous ECG   Similar to previous ECG  Rhythm: sinus rhythm    Clinical impression: normal ECG           Assessment:       Diagnosis Plan   1. Post-COVID syndrome        2. COVID-19 long hauler        3. Palpitations        4. Precordial pain                 Plan:       Well I actually believe this lady I think she does have long COVID symptoms and it has been devastating for her when you listen to her heart heart rate there is 0 heart rate variability it pegged at 85.  COVID can cause autonomic dysfunction I do not know if the spell she is having or possibly vasospasm or microvascular disease.  She has been tried on beta-blocker she has been tried on Corlanor it has not really helped much with her and then these " do things in the upper chest I think are either some kind of angina or GI symptoms I was considering a beta-blocker but she has been on it before it has not helped and she has asthma so I do not think that is a good option I was thinking about a nitrate with her to see if that would help but she has migraines that we will almost for sure fire that up it is possible that Cardizem will fire up the migraines but I think that is our best option.  I am going to stop the Corlanor she is going to speak with the [facility] but I think if she goes down there and they were to evaluate her maybe they could do a cath to look at her coronary flow reserve in her microvascular reserve it should be normal every test that we have done on her here has been normal normal echo normal MRI normal cardiac CT angiogram all of her monitors are normal but I am going to looked back at those.  I am going to have her come back and see me in a month    Return in about 4 weeks (around 3/5/2024).     As always, it has been a pleasure to participate in your patient's care.      Sincerely,       Shashi Walter MD

## 2024-02-07 ENCOUNTER — TELEPHONE (OUTPATIENT)
Dept: CARDIOLOGY | Facility: CLINIC | Age: 44
End: 2024-02-07
Payer: COMMERCIAL

## 2024-02-07 NOTE — TELEPHONE ENCOUNTER
----- Message from Ni Rodríguez sent at 2/7/2024  9:01 AM EST -----  Regarding: Received Message  Contact: [phone]  Dr. Walter,  Thank you for taking time out of your evening to continue researching my condition and best next steps, as well as calling me at home. I received your message about additional bloodwork. [place] is convenient, so I don't mind to go there. Please let me know next steps, i.e., if I need an appointment, or if I can go any time. Thank you again for listening yesterday and your kindness and compassion.  Dashawn,  Ni Rodríguez

## 2024-02-07 NOTE — TELEPHONE ENCOUNTER
Called patient and told her if this is [facility] they should be able to see the labs Dr. Walter has ordered.  I told her to call them just to make sure. If not she will call me back.

## 2024-02-09 DIAGNOSIS — E03.9 ACQUIRED HYPOTHYROIDISM: ICD-10-CM

## 2024-02-09 DIAGNOSIS — R05.1 ACUTE COUGH: ICD-10-CM

## 2024-02-09 DIAGNOSIS — J20.9 ACUTE BRONCHITIS, UNSPECIFIED ORGANISM: ICD-10-CM

## 2024-02-09 RX ORDER — LEVOTHYROXINE SODIUM 75 MCG
75 TABLET ORAL DAILY
Qty: 90 TABLET | Refills: 1 | Status: SHIPPED | OUTPATIENT
Start: 2024-02-09

## 2024-02-09 RX ORDER — DEXTROMETHORPHAN HYDROBROMIDE AND PROMETHAZINE HYDROCHLORIDE 15; 6.25 MG/5ML; MG/5ML
SYRUP ORAL
Qty: 118 ML | Refills: 0 | OUTPATIENT
Start: 2024-02-09

## 2024-02-19 ENCOUNTER — LAB (OUTPATIENT)
Dept: LAB | Facility: HOSPITAL | Age: 44
End: 2024-02-19
Payer: COMMERCIAL

## 2024-02-19 DIAGNOSIS — U09.9 POST-COVID SYNDROME: ICD-10-CM

## 2024-02-19 DIAGNOSIS — U09.9 COVID-19 LONG HAULER: ICD-10-CM

## 2024-02-19 DIAGNOSIS — R07.2 PRECORDIAL PAIN: ICD-10-CM

## 2024-02-19 DIAGNOSIS — R06.09 DOE (DYSPNEA ON EXERTION): ICD-10-CM

## 2024-02-19 DIAGNOSIS — R00.2 PALPITATIONS: ICD-10-CM

## 2024-02-19 LAB
ALBUMIN SERPL-MCNC: 4 G/DL (ref 3.5–5.2)
ALBUMIN/GLOB SERPL: 1.4 G/DL
ALP SERPL-CCNC: 49 U/L (ref 39–117)
ALT SERPL W P-5'-P-CCNC: 17 U/L (ref 1–33)
ANION GAP SERPL CALCULATED.3IONS-SCNC: 10 MMOL/L (ref 5–15)
AST SERPL-CCNC: 13 U/L (ref 1–32)
BASOPHILS # BLD AUTO: 0.05 10*3/MM3 (ref 0–0.2)
BASOPHILS NFR BLD AUTO: 0.4 % (ref 0–1.5)
BILIRUB SERPL-MCNC: 0.2 MG/DL (ref 0–1.2)
BUN SERPL-MCNC: 12 MG/DL (ref 6–20)
BUN/CREAT SERPL: 15.6 (ref 7–25)
CALCIUM SPEC-SCNC: 9.8 MG/DL (ref 8.6–10.5)
CHLORIDE SERPL-SCNC: 105 MMOL/L (ref 98–107)
CO2 SERPL-SCNC: 22 MMOL/L (ref 22–29)
CREAT SERPL-MCNC: 0.77 MG/DL (ref 0.57–1)
CRP SERPL-MCNC: 1.03 MG/DL (ref 0.01–0.5)
DEPRECATED RDW RBC AUTO: 38.3 FL (ref 37–54)
EGFRCR SERPLBLD CKD-EPI 2021: 97.7 ML/MIN/1.73
EOSINOPHIL # BLD AUTO: 0.13 10*3/MM3 (ref 0–0.4)
EOSINOPHIL NFR BLD AUTO: 1 % (ref 0.3–6.2)
ERYTHROCYTE [DISTWIDTH] IN BLOOD BY AUTOMATED COUNT: 12.5 % (ref 12.3–15.4)
ERYTHROCYTE [SEDIMENTATION RATE] IN BLOOD: 24 MM/HR (ref 0–20)
FERRITIN SERPL-MCNC: 130 NG/ML (ref 13–150)
GLOBULIN UR ELPH-MCNC: 2.8 GM/DL
GLUCOSE SERPL-MCNC: 117 MG/DL (ref 65–99)
HCT VFR BLD AUTO: 43 % (ref 34–46.6)
HGB BLD-MCNC: 14.3 G/DL (ref 12–15.9)
IMM GRANULOCYTES # BLD AUTO: 0.11 10*3/MM3 (ref 0–0.05)
IMM GRANULOCYTES NFR BLD AUTO: 0.9 % (ref 0–0.5)
LYMPHOCYTES # BLD AUTO: 3.41 10*3/MM3 (ref 0.7–3.1)
LYMPHOCYTES NFR BLD AUTO: 27 % (ref 19.6–45.3)
MCH RBC QN AUTO: 28.4 PG (ref 26.6–33)
MCHC RBC AUTO-ENTMCNC: 33.3 G/DL (ref 31.5–35.7)
MCV RBC AUTO: 85.3 FL (ref 79–97)
MONOCYTES # BLD AUTO: 1.34 10*3/MM3 (ref 0.1–0.9)
MONOCYTES NFR BLD AUTO: 10.6 % (ref 5–12)
NEUTROPHILS NFR BLD AUTO: 60.1 % (ref 42.7–76)
NEUTROPHILS NFR BLD AUTO: 7.59 10*3/MM3 (ref 1.7–7)
NRBC BLD AUTO-RTO: 0 /100 WBC (ref 0–0.2)
NT-PROBNP SERPL-MCNC: <36 PG/ML (ref 0–450)
PLATELET # BLD AUTO: 283 10*3/MM3 (ref 140–450)
PMV BLD AUTO: 10.5 FL (ref 6–12)
POTASSIUM SERPL-SCNC: 4.1 MMOL/L (ref 3.5–5.2)
PROT SERPL-MCNC: 6.8 G/DL (ref 6–8.5)
RBC # BLD AUTO: 5.04 10*6/MM3 (ref 3.77–5.28)
SODIUM SERPL-SCNC: 137 MMOL/L (ref 136–145)
TROPONIN T SERPL HS-MCNC: <6 NG/L
WBC NRBC COR # BLD AUTO: 12.63 10*3/MM3 (ref 3.4–10.8)

## 2024-02-19 PROCEDURE — 82728 ASSAY OF FERRITIN: CPT

## 2024-02-19 PROCEDURE — 83880 ASSAY OF NATRIURETIC PEPTIDE: CPT

## 2024-02-19 PROCEDURE — 85652 RBC SED RATE AUTOMATED: CPT

## 2024-02-19 PROCEDURE — 84484 ASSAY OF TROPONIN QUANT: CPT

## 2024-02-19 PROCEDURE — 80053 COMPREHEN METABOLIC PANEL: CPT

## 2024-02-19 PROCEDURE — 36415 COLL VENOUS BLD VENIPUNCTURE: CPT

## 2024-02-19 PROCEDURE — 86141 C-REACTIVE PROTEIN HS: CPT

## 2024-02-19 PROCEDURE — 85025 COMPLETE CBC W/AUTO DIFF WBC: CPT

## 2024-02-20 RX ORDER — RANOLAZINE 500 MG/1
500 TABLET, EXTENDED RELEASE ORAL 2 TIMES DAILY
Qty: 180 TABLET | Refills: 3 | Status: SHIPPED | OUTPATIENT
Start: 2024-02-20

## 2024-02-29 ENCOUNTER — OFFICE VISIT (OUTPATIENT)
Dept: FAMILY MEDICINE CLINIC | Facility: CLINIC | Age: 44
End: 2024-02-29
Payer: COMMERCIAL

## 2024-02-29 VITALS
OXYGEN SATURATION: 99 % | HEIGHT: 68 IN | RESPIRATION RATE: 16 BRPM | HEART RATE: 90 BPM | BODY MASS INDEX: 33.34 KG/M2 | TEMPERATURE: 98 F | WEIGHT: 220 LBS | SYSTOLIC BLOOD PRESSURE: 120 MMHG | DIASTOLIC BLOOD PRESSURE: 80 MMHG

## 2024-02-29 DIAGNOSIS — U09.9 POST-COVID SYNDROME: ICD-10-CM

## 2024-02-29 DIAGNOSIS — U09.9 COVID-19 LONG HAULER: Primary | ICD-10-CM

## 2024-02-29 PROCEDURE — 99213 OFFICE O/P EST LOW 20 MIN: CPT | Performed by: PHYSICIAN ASSISTANT

## 2024-02-29 RX ORDER — NAPROXEN 500 MG/1
500 TABLET ORAL 2 TIMES DAILY WITH MEALS
COMMUNITY
Start: 2024-02-23 | End: 2024-03-24

## 2024-02-29 NOTE — LETTER
February 29, 2024    Ni Rodríguez  [address] [id number]              To Whom It May Concern:     It is my medical opinion that Ni Rodríguez may have intermittent leave due to her long hauler COVID symptoms.  Ni may have flare ups of increased fatigue, breathing issues and heart palpitations.  Her symptoms may be exacerbated by exposure to illnesses and possibly weather changes.  She will be evaluated monthly.  I expect her condition may last several months but is undetermined at this time.                   Miri Deshpande PA-C

## 2024-02-29 NOTE — PROGRESS NOTES
"Chief Complaint  Long Covid    Subjective          Ni Rodríguez presents to [facility]  History of Present Illness  Ni is a 43 year old female who presents for long COVID symptoms.  She is still seeing the [facility] as well as her cardiologist.  Recently changed to Ranexa which seems to be helping some.  Also she has started taking naproxen to see if this would help with her chest discomfort.  Ni states she is still getting increased heart rates.  She has a follow-up appointment with cardiologist next week and [facility].  States she has good days and bad days.  Today states is a bad day.  She tends to still get emotional when she cannot do things that she used to.  Easily had an upper respiratory infection was treated with high doses of steroids which helped.  She has been taking her inhalers and her other medications as directed.  Trying to eat a healthy diet.  Sleep has been normal.  Bowel movements have been regular without dark black tarry stools.  States she still having some neck discomfort but the chest has improved.  Denied any current fevers, chills, cough, wheezing, shortness of breath, GI upset or swelling of ankles.     Objective   Vital Signs:   /80 (BP Location: Right arm, Patient Position: Sitting, Cuff Size: Adult)   Pulse 90   Temp 98 °F (36.7 °C)   Resp 16   Ht 172.7 cm (68\")   Wt 99.8 kg (220 lb)   SpO2 99%   BMI 33.45 kg/m²     Physical Exam  Vitals and nursing note reviewed.   Constitutional:       Appearance: Normal appearance. She is well-developed and well-groomed. She is obese.   HENT:      Head: Normocephalic and atraumatic.   Neck:      Vascular: No carotid bruit.      Trachea: Trachea and phonation normal. No tracheal tenderness.   Cardiovascular:      Rate and Rhythm: Normal rate and regular rhythm.      Pulses: Normal pulses.      Heart sounds: Normal heart sounds, S1 normal and S2 normal. No murmur heard.  Pulmonary:      Effort: " Pulmonary effort is normal.      Breath sounds: Normal breath sounds and air entry.   Abdominal:      General: Bowel sounds are normal.      Palpations: Abdomen is soft. There is no hepatomegaly.      Tenderness: There is no abdominal tenderness. There is no right CVA tenderness, left CVA tenderness, guarding or rebound. Negative signs include Berg's sign, Rovsing's sign, McBurney's sign, psoas sign and obturator sign.   Musculoskeletal:      Cervical back: Neck supple.      Right lower leg: No edema.      Left lower leg: No edema.   Skin:     General: Skin is warm and dry.      Capillary Refill: Capillary refill takes less than 2 seconds.   Neurological:      Mental Status: She is alert and oriented to person, place, and time.   Psychiatric:         Attention and Perception: Attention and perception normal.         Mood and Affect: Mood and affect normal. Mood is depressed. Affect is tearful.         Speech: Speech normal.         Behavior: Behavior normal. Behavior is cooperative.         Thought Content: Thought content normal.         Cognition and Memory: Cognition and memory normal.         Judgment: Judgment normal.        Result Review :          Office Visit with Shashi Walter MD (02/06/2024)         Assessment and Plan    Diagnoses and all orders for this visit:    1. COVID-19 long hauler (Primary)    2. Post-COVID syndrome    Ni was seen in office today for chronic and stable long-haul COVID./Post COVID syndrome.  I reviewed her recent cardiology office notes with her at office visit today.  She will continue her current medication at home as directed.  Keep follow-up appointments with cardiology and the [facility].  Have given her a work excuse.  Stressed the importance of staying hydrated and taking medication as directed.  She will keep her appointments with her therapist as well to help with the mental aspects of her COVID long-term symptoms.    I spent 252 minutes caring for Ni on this  date of service. This time includes time spent by me in the following activities:preparing for the visit, obtaining and/or reviewing a separately obtained history, performing a medically appropriate examination and/or evaluation , counseling and educating the patient/family/caregiver, and documenting information in the medical record  Follow Up   No follow-ups on file.  Patient was given instructions and counseling regarding her condition or for health maintenance advice. Please see specific information pulled into the AVS if appropriate.     [facility]  [address] [place] [phone]  Dept: [phone]  Dept Fax: [phone]  Loc: [phone]  Loc Fax: [phone]

## 2024-03-04 DIAGNOSIS — U09.9 POST-COVID SYNDROME: ICD-10-CM

## 2024-03-04 DIAGNOSIS — F41.1 GAD (GENERALIZED ANXIETY DISORDER): ICD-10-CM

## 2024-03-04 DIAGNOSIS — M25.50 ARTHRALGIA, UNSPECIFIED JOINT: ICD-10-CM

## 2024-03-04 DIAGNOSIS — U09.9 POST-COVID SYNDROME: Primary | ICD-10-CM

## 2024-03-04 RX ORDER — DULOXETIN HYDROCHLORIDE 30 MG/1
CAPSULE, DELAYED RELEASE ORAL
Qty: 30 CAPSULE | Refills: 1 | Status: SHIPPED | OUTPATIENT
Start: 2024-03-04 | End: 2024-03-04 | Stop reason: SDUPTHER

## 2024-03-04 RX ORDER — DULOXETIN HYDROCHLORIDE 30 MG/1
CAPSULE, DELAYED RELEASE ORAL
Qty: 90 CAPSULE | Refills: 0 | Status: SHIPPED | OUTPATIENT
Start: 2024-03-04

## 2024-03-08 ENCOUNTER — OFFICE VISIT (OUTPATIENT)
Dept: CARDIOLOGY | Facility: CLINIC | Age: 44
End: 2024-03-08
Payer: COMMERCIAL

## 2024-03-08 VITALS
BODY MASS INDEX: 34.1 KG/M2 | HEART RATE: 101 BPM | WEIGHT: 225 LBS | HEIGHT: 68 IN | DIASTOLIC BLOOD PRESSURE: 96 MMHG | SYSTOLIC BLOOD PRESSURE: 120 MMHG

## 2024-03-08 DIAGNOSIS — G43.719 INTRACTABLE CHRONIC MIGRAINE WITHOUT AURA AND WITHOUT STATUS MIGRAINOSUS: ICD-10-CM

## 2024-03-08 DIAGNOSIS — J45.909 MODERATE ASTHMA, UNSPECIFIED WHETHER COMPLICATED, UNSPECIFIED WHETHER PERSISTENT: ICD-10-CM

## 2024-03-08 DIAGNOSIS — G90.9 AUTONOMIC DYSFUNCTION: ICD-10-CM

## 2024-03-08 DIAGNOSIS — U09.9 POST-COVID SYNDROME: Primary | ICD-10-CM

## 2024-03-08 PROCEDURE — 93000 ELECTROCARDIOGRAM COMPLETE: CPT | Performed by: INTERNAL MEDICINE

## 2024-03-08 PROCEDURE — 99214 OFFICE O/P EST MOD 30 MIN: CPT | Performed by: INTERNAL MEDICINE

## 2024-03-08 RX ORDER — METOPROLOL SUCCINATE 25 MG/1
25 TABLET, EXTENDED RELEASE ORAL DAILY
Qty: 90 TABLET | Refills: 3 | Status: SHIPPED | OUTPATIENT
Start: 2024-03-08

## 2024-03-08 NOTE — PROGRESS NOTES
Date of Office Visit: 24  Encounter Provider: Shashi Walter MD  Place of Service: [facility] CARDIOLOGY  Patient Name: Ni Rodríguez  :1980  [id number]    Chief Complaint   Patient presents with    Hypertension   :     HPI: Ni Rodríguez is a 43 y.o. female Who has had COVID-19 and been afflicted with long COVID syndrome.  She previously healthy woman got COVID and everything has kind of fallen apart she has migraines she has asthma she has lots of tachycardia she has been diagnosed as having long COVID.  She has gained about 30 to 40 pounds.  She had a normal coronary CT angiogram in 2023.  She has had normal echocardiography.  Holter monitors have been fairly unremarkable.  Looking back at her labs she has always had an elevated CRP and a mildly elevated sed rate.  I see 1 place where she had a high IgE level in 2023 she got put on Dupixent after that and does not have that followed up she had a normal interleukin-6 level.  She went to the [facility] they told her she had long COVID she may have pots disease.  They had tried her on Corlanor for her heart rate and she is also on Cymbalta.  She did not feel like it made much change and so when I saw her last month we tried her on some Ranexa and also Cardizem because I feel like she truly has angina times.  And that also has not helped.    She is here for follow-up today.  She has recently had some headaches and she is getting infusion and has not helped that much she been in contact with the [facility].  And they started her on Naprosyn to see if that would help with her inflammation she thought maybe it did but then today and yesterday she noticed that her blood pressure is higher and her heart rate is continue to be high.  He still has this discomfort that really sounds like angina like if she is walking she will get chest discomfort in her upper chest up into her neck she says it feels like there is  fire burning through her neck.  She has not had any fevers chills sweats rash no change in her weight or respiratory symptoms that she had last time have resolved she has been treated with multiple doses of steroids and it has not made any difference in how she feels in the past      Past Medical History:   Diagnosis Date    Bright red blood per rectum     COVID     Disease of thyroid gland     Environmental allergies     History of prior pregnancies      3    Migraine     Miscarriage     X 1    URI (upper respiratory infection)        History reviewed. No pertinent surgical history.    Social History     Socioeconomic History    Marital status:     Number of children: 2    Years of education: masters in education   Tobacco Use    Smoking status: Never    Smokeless tobacco: Never   Vaping Use    Vaping status: Never Used   Substance and Sexual Activity    Alcohol use: Yes     Alcohol/week: 1.0 standard drink of alcohol     Types: 1 Glasses of wine per week    Drug use: No    Sexual activity: Yes     Partners: Male     Birth control/protection: OCP       Family History   Problem Relation Age of Onset    Arthritis Mother         Osteoarthritis    Thyroid disease Mother     Hypertension Father     Pancreatitis Father         GB complication - /2 removed    Hyperlipidemia Father     Diabetes Father     Other Sister         Hypoglycemia    Hypothyroidism Brother     Uterine cancer Maternal Grandmother     Alzheimer's disease Maternal Grandfather     Prostate cancer Maternal Grandfather     Arthritis Paternal Grandmother         Osteoarthritis    Prostate cancer Paternal Grandfather        Review of Systems   Constitutional: Negative for decreased appetite, fever, malaise/fatigue and weight loss.   HENT:  Negative for nosebleeds.    Eyes:  Negative for double vision.   Cardiovascular:  Negative for chest pain, claudication, cyanosis, dyspnea on exertion, irregular heartbeat, leg swelling, near-syncope,  orthopnea, palpitations, paroxysmal nocturnal dyspnea and syncope.   Respiratory:  Negative for cough, hemoptysis and shortness of breath.    Hematologic/Lymphatic: Negative for bleeding problem.   Skin:  Negative for rash.   Musculoskeletal:  Negative for falls and myalgias.   Gastrointestinal:  Negative for hematochezia, jaundice, melena, nausea and vomiting.   Genitourinary:  Negative for hematuria.   Neurological:  Negative for dizziness and seizures.   Psychiatric/Behavioral:  Negative for altered mental status and memory loss.        Allergies   Allergen Reactions    Bee Venom Hives, Itching and Other (See Comments)     Localized reaction redness    Wasp Venom Protein Hives and Itching     Localized reaction redness         Current Outpatient Medications:     albuterol sulfate  (90 Base) MCG/ACT inhaler, INHALE 2 PUFFS EVERY 4 HOURS AS NEEDED FOR WHEEZING OR SHORTNESS OF AIR, Disp: 18 g, Rfl: 3    ALPRAZolam (XANAX) 0.5 MG tablet, TAKE 0.5 TO ONE TABLET TWICE A DAY AS NEEDED FOR ANXIETY, Disp: 30 tablet, Rfl: 0    Ascorbic Acid (Vitamin C) 500 MG capsule, , Disp: , Rfl:     Auvi-Q 0.3 MG/0.3ML solution auto-injector injection, , Disp: , Rfl:     azelastine (ASTELIN) 0.1 % nasal spray, , Disp: , Rfl:     Breztri Aerosphere 160-9-4.8 MCG/ACT aerosol inhaler, , Disp: , Rfl:     budesonide (PULMICORT) 0.5 MG/2ML nebulizer solution, , Disp: , Rfl:     drospirenone-ethinyl estradiol (MATT,OCELLA) 3-0.03 MG per tablet, TAKE 1 TABLET BY MOUTH EVERY DAY, Disp: 84 tablet, Rfl: 0    DULoxetine (Cymbalta) 30 MG capsule, Take once a day with the Cymbalta 60 mg, Disp: 90 capsule, Rfl: 0    DULoxetine (CYMBALTA) 60 MG capsule, Take 1 capsule by mouth Daily., Disp: 90 capsule, Rfl: 0    Dupixent 300 MG/2ML solution pen-injector, , Disp: , Rfl:     Erenumab-aooe (Aimovig) 140 MG/ML prefilled syringe, Inject 1 mL under the skin into the appropriate area as directed., Disp: , Rfl:     ferrous sulfate 325 (65 FE) MG  "tablet, , Disp: , Rfl:     fexofenadine (ALLEGRA) 180 MG tablet, Take 1 tablet by mouth Daily., Disp: , Rfl:     ipratropium-albuterol (DUO-NEB) 0.5-2.5 mg/3 ml nebulizer, Take 3 mL by nebulization Every 4 (Four) Hours As Needed for Wheezing or Shortness of Air., Disp: 360 mL, Rfl: 2    montelukast (SINGULAIR) 10 MG tablet, Take 1 tablet by mouth every night at bedtime., Disp: , Rfl:     naproxen (NAPROSYN) 500 MG tablet, Take 1 tablet by mouth 2 (Two) Times a Day With Meals., Disp: , Rfl:     Nurtec 75 MG dispersible tablet, Take 1 tablet by mouth., Disp: , Rfl:     OnabotulinumtoxinA 200 units reconstituted solution, Inject 200 Units into the appropriate muscle as directed by prescriber., Disp: , Rfl:     Synthroid 75 MCG tablet, TAKE 1 TABLET BY MOUTH DAILY, Disp: 90 tablet, Rfl: 1    metoprolol succinate XL (TOPROL-XL) 25 MG 24 hr tablet, Take 1 tablet by mouth Daily., Disp: 90 tablet, Rfl: 3      Objective:     Vitals:    03/08/24 1521   BP: 120/96   Pulse: 101   Weight: 102 kg (225 lb)   Height: 172.7 cm (68\")     Body mass index is 34.21 kg/m².    Constitutional:       Appearance: Well-developed.   Eyes:      General: No scleral icterus.  HENT:      Head: Normocephalic.   Neck:      Thyroid: No thyromegaly.      Vascular: No JVD.      Lymphadenopathy: No cervical adenopathy.   Pulmonary:      Effort: Pulmonary effort is normal.      Breath sounds: Normal breath sounds. No wheezing. No rales.   Cardiovascular:      Normal rate. Regular rhythm.      No gallop.    Edema:     Peripheral edema absent.   Abdominal:      Palpations: Abdomen is soft.      Tenderness: There is no abdominal tenderness.   Musculoskeletal: Normal range of motion. Skin:     General: Skin is warm and dry.      Findings: No rash.   Neurological:      Mental Status: Alert and oriented to person, place, and time.           ECG 12 Lead    Date/Time: 3/8/2024 5:20 PM  Performed by: Shashi Walter MD    Authorized by: Shashi Walter MD  " Comparison: compared with previous ECG   Similar to previous ECG  Rhythm: sinus tachycardia    Clinical impression: abnormal EKG           Assessment:       Diagnosis Plan   1. Post-COVID syndrome  Tilt Table      2. Autonomic dysfunction        3. Intractable chronic migraine without aura and without status migrainosus        4. Moderate asthma, unspecified whether complicated, unspecified whether persistent                 Plan:       Well what we tried so far has not worked I am not really super surprised it would be great if it did but I do not think it has right now we are dealing with a little bit of higher blood pressure as well as high heart rate may have gotten some benefit from the Naprosyn but I think it is probably raising her blood pressure so what I would like to do is unguinal try her on metoprolol succinate long-acting 1 time a day and see if that slows her heart rate down and brings her blood pressure down a little bit show will get a go ahead and get a tilt table test to evaluate for POTS disease I continue to be interested in colchicine but have not decided to put her on that yet.  I wonder about having her see an immunologist if there is any value in checking you know interleukin levels or that kind of stuff she seems inflamed to me and I think obviously has autonomic dysfunction I am and have her come back and see us in a month    No follow-ups on file.     As always, it has been a pleasure to participate in your patient's care.      Sincerely,       Shashi Walter MD

## 2024-03-08 NOTE — H&P (VIEW-ONLY)
Date of Office Visit: 24  Encounter Provider: Shashi Walter MD  Place of Service: [facility] CARDIOLOGY  Patient Name: Ni Rodríguez  :1980  [id number]    Chief Complaint   Patient presents with    Hypertension   :     HPI: Ni Rodríguez is a 43 y.o. female Who has had COVID-19 and been afflicted with long COVID syndrome.  She previously healthy woman got COVID and everything has kind of fallen apart she has migraines she has asthma she has lots of tachycardia she has been diagnosed as having long COVID.  She has gained about 30 to 40 pounds.  She had a normal coronary CT angiogram in 2023.  She has had normal echocardiography.  Holter monitors have been fairly unremarkable.  Looking back at her labs she has always had an elevated CRP and a mildly elevated sed rate.  I see 1 place where she had a high IgE level in 2023 she got put on Dupixent after that and does not have that followed up she had a normal interleukin-6 level.  She went to the [facility] they told her she had long COVID she may have pots disease.  They had tried her on Corlanor for her heart rate and she is also on Cymbalta.  She did not feel like it made much change and so when I saw her last month we tried her on some Ranexa and also Cardizem because I feel like she truly has angina times.  And that also has not helped.    She is here for follow-up today.  She has recently had some headaches and she is getting infusion and has not helped that much she been in contact with the [facility].  And they started her on Naprosyn to see if that would help with her inflammation she thought maybe it did but then today and yesterday she noticed that her blood pressure is higher and her heart rate is continue to be high.  He still has this discomfort that really sounds like angina like if she is walking she will get chest discomfort in her upper chest up into her neck she says it feels like there is  fire burning through her neck.  She has not had any fevers chills sweats rash no change in her weight or respiratory symptoms that she had last time have resolved she has been treated with multiple doses of steroids and it has not made any difference in how she feels in the past      Past Medical History:   Diagnosis Date    Bright red blood per rectum     COVID     Disease of thyroid gland     Environmental allergies     History of prior pregnancies      3    Migraine     Miscarriage     X 1    URI (upper respiratory infection)        History reviewed. No pertinent surgical history.    Social History     Socioeconomic History    Marital status:     Number of children: 2    Years of education: masters in education   Tobacco Use    Smoking status: Never    Smokeless tobacco: Never   Vaping Use    Vaping status: Never Used   Substance and Sexual Activity    Alcohol use: Yes     Alcohol/week: 1.0 standard drink of alcohol     Types: 1 Glasses of wine per week    Drug use: No    Sexual activity: Yes     Partners: Male     Birth control/protection: OCP       Family History   Problem Relation Age of Onset    Arthritis Mother         Osteoarthritis    Thyroid disease Mother     Hypertension Father     Pancreatitis Father         GB complication - /2 removed    Hyperlipidemia Father     Diabetes Father     Other Sister         Hypoglycemia    Hypothyroidism Brother     Uterine cancer Maternal Grandmother     Alzheimer's disease Maternal Grandfather     Prostate cancer Maternal Grandfather     Arthritis Paternal Grandmother         Osteoarthritis    Prostate cancer Paternal Grandfather        Review of Systems   Constitutional: Negative for decreased appetite, fever, malaise/fatigue and weight loss.   HENT:  Negative for nosebleeds.    Eyes:  Negative for double vision.   Cardiovascular:  Negative for chest pain, claudication, cyanosis, dyspnea on exertion, irregular heartbeat, leg swelling, near-syncope,  orthopnea, palpitations, paroxysmal nocturnal dyspnea and syncope.   Respiratory:  Negative for cough, hemoptysis and shortness of breath.    Hematologic/Lymphatic: Negative for bleeding problem.   Skin:  Negative for rash.   Musculoskeletal:  Negative for falls and myalgias.   Gastrointestinal:  Negative for hematochezia, jaundice, melena, nausea and vomiting.   Genitourinary:  Negative for hematuria.   Neurological:  Negative for dizziness and seizures.   Psychiatric/Behavioral:  Negative for altered mental status and memory loss.        Allergies   Allergen Reactions    Bee Venom Hives, Itching and Other (See Comments)     Localized reaction redness    Wasp Venom Protein Hives and Itching     Localized reaction redness         Current Outpatient Medications:     albuterol sulfate  (90 Base) MCG/ACT inhaler, INHALE 2 PUFFS EVERY 4 HOURS AS NEEDED FOR WHEEZING OR SHORTNESS OF AIR, Disp: 18 g, Rfl: 3    ALPRAZolam (XANAX) 0.5 MG tablet, TAKE 0.5 TO ONE TABLET TWICE A DAY AS NEEDED FOR ANXIETY, Disp: 30 tablet, Rfl: 0    Ascorbic Acid (Vitamin C) 500 MG capsule, , Disp: , Rfl:     Auvi-Q 0.3 MG/0.3ML solution auto-injector injection, , Disp: , Rfl:     azelastine (ASTELIN) 0.1 % nasal spray, , Disp: , Rfl:     Breztri Aerosphere 160-9-4.8 MCG/ACT aerosol inhaler, , Disp: , Rfl:     budesonide (PULMICORT) 0.5 MG/2ML nebulizer solution, , Disp: , Rfl:     drospirenone-ethinyl estradiol (MATT,OCELLA) 3-0.03 MG per tablet, TAKE 1 TABLET BY MOUTH EVERY DAY, Disp: 84 tablet, Rfl: 0    DULoxetine (Cymbalta) 30 MG capsule, Take once a day with the Cymbalta 60 mg, Disp: 90 capsule, Rfl: 0    DULoxetine (CYMBALTA) 60 MG capsule, Take 1 capsule by mouth Daily., Disp: 90 capsule, Rfl: 0    Dupixent 300 MG/2ML solution pen-injector, , Disp: , Rfl:     Erenumab-aooe (Aimovig) 140 MG/ML prefilled syringe, Inject 1 mL under the skin into the appropriate area as directed., Disp: , Rfl:     ferrous sulfate 325 (65 FE) MG  "tablet, , Disp: , Rfl:     fexofenadine (ALLEGRA) 180 MG tablet, Take 1 tablet by mouth Daily., Disp: , Rfl:     ipratropium-albuterol (DUO-NEB) 0.5-2.5 mg/3 ml nebulizer, Take 3 mL by nebulization Every 4 (Four) Hours As Needed for Wheezing or Shortness of Air., Disp: 360 mL, Rfl: 2    montelukast (SINGULAIR) 10 MG tablet, Take 1 tablet by mouth every night at bedtime., Disp: , Rfl:     naproxen (NAPROSYN) 500 MG tablet, Take 1 tablet by mouth 2 (Two) Times a Day With Meals., Disp: , Rfl:     Nurtec 75 MG dispersible tablet, Take 1 tablet by mouth., Disp: , Rfl:     OnabotulinumtoxinA 200 units reconstituted solution, Inject 200 Units into the appropriate muscle as directed by prescriber., Disp: , Rfl:     Synthroid 75 MCG tablet, TAKE 1 TABLET BY MOUTH DAILY, Disp: 90 tablet, Rfl: 1    metoprolol succinate XL (TOPROL-XL) 25 MG 24 hr tablet, Take 1 tablet by mouth Daily., Disp: 90 tablet, Rfl: 3      Objective:     Vitals:    03/08/24 1521   BP: 120/96   Pulse: 101   Weight: 102 kg (225 lb)   Height: 172.7 cm (68\")     Body mass index is 34.21 kg/m².    Constitutional:       Appearance: Well-developed.   Eyes:      General: No scleral icterus.  HENT:      Head: Normocephalic.   Neck:      Thyroid: No thyromegaly.      Vascular: No JVD.      Lymphadenopathy: No cervical adenopathy.   Pulmonary:      Effort: Pulmonary effort is normal.      Breath sounds: Normal breath sounds. No wheezing. No rales.   Cardiovascular:      Normal rate. Regular rhythm.      No gallop.    Edema:     Peripheral edema absent.   Abdominal:      Palpations: Abdomen is soft.      Tenderness: There is no abdominal tenderness.   Musculoskeletal: Normal range of motion. Skin:     General: Skin is warm and dry.      Findings: No rash.   Neurological:      Mental Status: Alert and oriented to person, place, and time.           ECG 12 Lead    Date/Time: 3/8/2024 5:20 PM  Performed by: Shashi Walter MD    Authorized by: Shashi Walter MD  " Comparison: compared with previous ECG   Similar to previous ECG  Rhythm: sinus tachycardia    Clinical impression: abnormal EKG           Assessment:       Diagnosis Plan   1. Post-COVID syndrome  Tilt Table      2. Autonomic dysfunction        3. Intractable chronic migraine without aura and without status migrainosus        4. Moderate asthma, unspecified whether complicated, unspecified whether persistent                 Plan:       Well what we tried so far has not worked I am not really super surprised it would be great if it did but I do not think it has right now we are dealing with a little bit of higher blood pressure as well as high heart rate may have gotten some benefit from the Naprosyn but I think it is probably raising her blood pressure so what I would like to do is unguinal try her on metoprolol succinate long-acting 1 time a day and see if that slows her heart rate down and brings her blood pressure down a little bit show will get a go ahead and get a tilt table test to evaluate for POTS disease I continue to be interested in colchicine but have not decided to put her on that yet.  I wonder about having her see an immunologist if there is any value in checking you know interleukin levels or that kind of stuff she seems inflamed to me and I think obviously has autonomic dysfunction I am and have her come back and see us in a month    No follow-ups on file.     As always, it has been a pleasure to participate in your patient's care.      Sincerely,       Shashi Walter MD

## 2024-03-21 ENCOUNTER — HOSPITAL ENCOUNTER (OUTPATIENT)
Dept: CARDIOLOGY | Facility: HOSPITAL | Age: 44
Discharge: HOME | End: 2024-03-21
Payer: COMMERCIAL

## 2024-03-21 VITALS
BODY MASS INDEX: 32.89 KG/M2 | HEIGHT: 68 IN | HEART RATE: 78 BPM | SYSTOLIC BLOOD PRESSURE: 123 MMHG | DIASTOLIC BLOOD PRESSURE: 89 MMHG | OXYGEN SATURATION: 99 % | TEMPERATURE: 96.7 F | WEIGHT: 217 LBS | RESPIRATION RATE: 16 BRPM

## 2024-03-21 DIAGNOSIS — U09.9 POST-COVID SYNDROME: ICD-10-CM

## 2024-03-21 PROCEDURE — 93660 TILT TABLE EVALUATION: CPT

## 2024-03-21 PROCEDURE — 25810000003 SODIUM CHLORIDE 0.9 % SOLUTION: Performed by: INTERNAL MEDICINE

## 2024-03-21 RX ORDER — SODIUM CHLORIDE 0.9 % (FLUSH) 0.9 %
10 SYRINGE (ML) INJECTION EVERY 12 HOURS SCHEDULED
Status: DISCONTINUED | OUTPATIENT
Start: 2024-03-21 | End: 2024-03-22 | Stop reason: HOSPADM

## 2024-03-21 RX ORDER — SODIUM CHLORIDE 0.9 % (FLUSH) 0.9 %
10 SYRINGE (ML) INJECTION AS NEEDED
Status: DISCONTINUED | OUTPATIENT
Start: 2024-03-21 | End: 2024-03-22 | Stop reason: HOSPADM

## 2024-03-21 RX ORDER — SODIUM CHLORIDE 9 MG/ML
75 INJECTION, SOLUTION INTRAVENOUS CONTINUOUS
Status: DISCONTINUED | OUTPATIENT
Start: 2024-03-21 | End: 2024-03-22 | Stop reason: HOSPADM

## 2024-03-21 RX ORDER — ASPIRIN 81 MG/1
81 TABLET ORAL DAILY
COMMUNITY

## 2024-03-21 RX ORDER — SODIUM CHLORIDE 9 MG/ML
40 INJECTION, SOLUTION INTRAVENOUS AS NEEDED
Status: DISCONTINUED | OUTPATIENT
Start: 2024-03-21 | End: 2024-03-22 | Stop reason: HOSPADM

## 2024-03-21 RX ORDER — NITROGLYCERIN 0.4 MG/1
0.4 TABLET SUBLINGUAL ONCE
Status: DISCONTINUED | OUTPATIENT
Start: 2024-03-21 | End: 2024-03-21

## 2024-03-21 RX ADMIN — SODIUM CHLORIDE 75 ML/HR: 9 INJECTION, SOLUTION INTRAVENOUS at 08:37

## 2024-03-21 NOTE — INTERVAL H&P NOTE
She is well-known to me were coming here to evaluate for POTS. H&P reviewed. The patient was examined and there are no changes to the H&P. I have explained the risks and benefits of the procedure to the patient.  The patient understands and agrees to proceed

## 2024-03-28 ENCOUNTER — OFFICE VISIT (OUTPATIENT)
Dept: FAMILY MEDICINE CLINIC | Facility: CLINIC | Age: 44
End: 2024-03-28
Payer: COMMERCIAL

## 2024-03-28 VITALS
SYSTOLIC BLOOD PRESSURE: 110 MMHG | RESPIRATION RATE: 16 BRPM | HEIGHT: 68 IN | DIASTOLIC BLOOD PRESSURE: 78 MMHG | BODY MASS INDEX: 33.49 KG/M2 | TEMPERATURE: 98.6 F | OXYGEN SATURATION: 98 % | WEIGHT: 221 LBS | HEART RATE: 104 BPM

## 2024-03-28 DIAGNOSIS — F41.1 GAD (GENERALIZED ANXIETY DISORDER): ICD-10-CM

## 2024-03-28 DIAGNOSIS — U09.9 COVID-19 LONG HAULER: Primary | ICD-10-CM

## 2024-03-28 DIAGNOSIS — M94.0 COSTOCHONDRITIS: ICD-10-CM

## 2024-03-28 PROCEDURE — 99213 OFFICE O/P EST LOW 20 MIN: CPT | Performed by: PHYSICIAN ASSISTANT

## 2024-03-28 RX ORDER — DULOXETIN HYDROCHLORIDE 60 MG/1
CAPSULE, DELAYED RELEASE ORAL
Qty: 90 CAPSULE | Refills: 0 | Status: SHIPPED | OUTPATIENT
Start: 2024-03-28

## 2024-03-28 RX ORDER — NAPROXEN 500 MG/1
500 TABLET ORAL 2 TIMES DAILY WITH MEALS
Qty: 60 TABLET | Refills: 2 | Status: SHIPPED | OUTPATIENT
Start: 2024-03-28

## 2024-03-28 NOTE — LETTER
March 28, 2024     Patient: Ni Rodríguez   YOB: 1980   Date of Visit: 3/28/2024       To Whom It May Concern:    t is my medical opinion that Ni Rodríguez may have intermittent leave due to her long hauler COVID symptoms.  Ni may have flare ups of increased fatigue, breathing issues and heart palpitations.  Her symptoms may be exacerbated by exposure to illnesses and possibly weather changes.  She will be evaluated monthly.  I expect her condition may last several months but is undetermined at this time.            Sincerely,        Miri Deshpande PA-C    CC: No Recipients

## 2024-03-28 NOTE — PROGRESS NOTES
"Chief Complaint  long covid    Subjective          Ni Rodríguez presents to [facility] PRIMARY CARE  History of Present Illness  Ni is a 43-year-old female who presents for long COVID symptoms.  States she is doing well today.  States she just takes it one day at a time.  She had seen an improvement with the naproxen medication for the costochondritis.  States she ran out of the medication a few days ago and has noticed the pain across chest again.  She has been using her inhalers.  Her appetite and sleep are normal.  Has seen an improvement with the Cymbalta 90 mg daily.  Denied any suicidal or homicidal ideation.     Objective   Vital Signs:   /78 (BP Location: Left arm, Patient Position: Sitting, Cuff Size: Adult)   Pulse 104   Temp 98.6 °F (37 °C) (Oral)   Resp 16   Ht 172.7 cm (67.99\")   Wt 100 kg (221 lb)   SpO2 98%   BMI 33.61 kg/m²     Physical Exam  Vitals and nursing note reviewed.   Constitutional:       Appearance: Normal appearance. She is well-developed and well-groomed. She is obese.   HENT:      Head: Normocephalic and atraumatic.   Neck:      Thyroid: No thyroid mass, thyromegaly or thyroid tenderness.      Vascular: No carotid bruit.      Trachea: Trachea and phonation normal. No tracheal tenderness.   Cardiovascular:      Rate and Rhythm: Normal rate and regular rhythm.      Pulses: Normal pulses.      Heart sounds: Normal heart sounds, S1 normal and S2 normal. No murmur heard.  Pulmonary:      Effort: Pulmonary effort is normal.      Breath sounds: Normal breath sounds and air entry.   Abdominal:      General: Bowel sounds are normal.      Palpations: Abdomen is soft. There is no hepatomegaly.      Tenderness: There is no abdominal tenderness. There is no right CVA tenderness, left CVA tenderness, guarding or rebound. Negative signs include Berg's sign, Rovsing's sign, McBurney's sign, psoas sign and obturator sign.   Musculoskeletal:      Cervical back: Neck " supple.      Right lower leg: No edema.      Left lower leg: No edema.   Skin:     General: Skin is warm and dry.      Capillary Refill: Capillary refill takes less than 2 seconds.   Neurological:      Mental Status: She is alert and oriented to person, place, and time.   Psychiatric:         Attention and Perception: Attention and perception normal.         Mood and Affect: Mood and affect normal.         Speech: Speech normal.         Behavior: Behavior normal. Behavior is cooperative.         Thought Content: Thought content normal.         Cognition and Memory: Cognition and memory normal.         Judgment: Judgment normal.        Result Review :          Office Visit with Shashi Walter MD (03/08/2024)         Assessment and Plan    Diagnoses and all orders for this visit:    1. COVID-19 long hauler (Primary)  -     DULoxetine (CYMBALTA) 60 MG capsule; Take once a day with the 30 mg of Cymbalta  Dispense: 90 capsule; Refill: 0    2. Costochondritis  -     naproxen (Naprosyn) 500 MG tablet; Take 1 tablet by mouth 2 (Two) Times a Day With Meals.  Dispense: 60 tablet; Refill: 2    3. HERBERT (generalized anxiety disorder)  -     DULoxetine (CYMBALTA) 60 MG capsule; Take once a day with the 30 mg of Cymbalta  Dispense: 90 capsule; Refill: 0    Ni was seen in office today for chronic and stable COVID 19 long-hauler with costochondritis and generalized anxiety disorder: I have refilled her naproxen and Cymbalta medication to pharmacy.  She will schedule follow-up appointment in 1 month.  Keep follow-up appointments with cardiology.  Have provided her a work letter as well.    I spent 18 minutes caring for Ni on this date of service. This time includes time spent by me in the following activities:preparing for the visit, obtaining and/or reviewing a separately obtained history, performing a medically appropriate examination and/or evaluation , counseling and educating the patient/family/caregiver, ordering  medications, tests, or procedures, and documenting information in the medical record  Follow Up   Return in about 25 days (around 4/22/2024).  Patient was given instructions and counseling regarding her condition or for health maintenance advice. Please see specific information pulled into the AVS if appropriate.     [facility]  [address] [place] [id number]  Dept: [phone]  Dept Fax: [phone]  Loc: [phone]  Loc Fax: [phone]

## 2024-04-13 DIAGNOSIS — U09.9 POST-COVID SYNDROME: ICD-10-CM

## 2024-04-15 RX ORDER — DROSPIRENONE AND ETHINYL ESTRADIOL 0.03MG-3MG
1 KIT ORAL DAILY
Qty: 84 TABLET | Refills: 0 | Status: SHIPPED | OUTPATIENT
Start: 2024-04-15

## 2024-04-16 ENCOUNTER — OFFICE VISIT (OUTPATIENT)
Dept: CARDIOLOGY | Facility: CLINIC | Age: 44
End: 2024-04-16
Payer: COMMERCIAL

## 2024-04-16 VITALS
SYSTOLIC BLOOD PRESSURE: 128 MMHG | HEART RATE: 52 BPM | BODY MASS INDEX: 34.13 KG/M2 | HEIGHT: 68 IN | WEIGHT: 225.2 LBS | DIASTOLIC BLOOD PRESSURE: 94 MMHG

## 2024-04-16 DIAGNOSIS — U09.9 POST-COVID SYNDROME: Primary | ICD-10-CM

## 2024-04-16 DIAGNOSIS — U09.9 COVID-19 LONG HAULER: ICD-10-CM

## 2024-04-16 PROCEDURE — 93000 ELECTROCARDIOGRAM COMPLETE: CPT | Performed by: INTERNAL MEDICINE

## 2024-04-16 PROCEDURE — 99214 OFFICE O/P EST MOD 30 MIN: CPT | Performed by: INTERNAL MEDICINE

## 2024-04-16 RX ORDER — METOPROLOL SUCCINATE 50 MG/1
50 TABLET, EXTENDED RELEASE ORAL DAILY
Qty: 90 TABLET | Refills: 3 | Status: SHIPPED | OUTPATIENT
Start: 2024-04-16

## 2024-04-16 NOTE — PROGRESS NOTES
Date of Office Visit: 24  Encounter Provider: Shashi Walter MD  Place of Service: [facility] CARDIOLOGY  Patient Name: Ni Rodríguez  :1980  [id number]    Chief Complaint   Patient presents with    Hypertension   :     HPI: Ni Rodríguez is a 43 y.o. female Who has had COVID-19 and been afflicted with long COVID syndrome.  She previously healthy woman got COVID and everything has kind of fallen apart she has migraines she has asthma she has lots of tachycardia she has been diagnosed as having long COVID.  She has gained about 30 to 40 pounds.  She had a normal coronary CT angiogram in 2023.  She has had normal echocardiography.  Holter monitors have been fairly unremarkable.  Looking back at her labs she has always had an elevated CRP and a mildly elevated sed rate.  I see 1 place where she had a high IgE level in 2023 she got put on Dupixent after that and does not have that followed up she had a normal interleukin-6 level.  She went to the [facility] they told her she had long COVID she may have pots disease.  They had tried her on Corlanor for her heart rate and she is also on Cymbalta.  She did not feel like it made much change and so when I saw her last month we tried her on some Ranexa and also Cardizem because I feel like she truly has angina times.  And that also has not helped.    She is here for follow-up today.  She has recently had some headaches and she is getting infusion and has not helped that much she been in contact with the [facility].  And they started her on Naprosyn to see if that would help with her inflammation she thought maybe it did but then today and yesterday she noticed that her blood pressure is higher and her heart rate is continue to be high.  He still has this discomfort that really sounds like angina like if she is walking she will get chest discomfort in her upper chest up into her neck she says it feels like there is  fire burning through her neck.  She has not had any fevers chills sweats rash no change in her weight or respiratory symptoms that she had last time have resolved she has been treated with multiple doses of steroids and it has not made any difference in how she feels in the past    So she is here for follow-up and actually for the first time ever she is a little bit better she seems to feel like the metoprolol is helping she had a little bit she cut grass yesterday and had a little discomfort like in her left upper chest and left lateral chest that she if she pushes on it it actually feels a little bit better some makes me feel like that might be more musculoskeletal but her heart rate is a little bit improved.  Migraines are better breathing is a little bit better no real change with her weight she is able to do about 17 minutes of walking at a time and she is slowly increasing that because that is kind of what the [facility] said to do.  Did have a tilt table test when we were here last she does not have the criteria for POTS syndrome      Past Medical History:   Diagnosis Date    Asthma     Bright red blood per rectum     COVID     Disease of thyroid gland     Environmental allergies     History of prior pregnancies      3    Migraine     Miscarriage     X 1    URI (upper respiratory infection)        History reviewed. No pertinent surgical history.    Social History     Socioeconomic History    Marital status:     Number of children: 2    Years of education: masters in education   Tobacco Use    Smoking status: Never    Smokeless tobacco: Never   Vaping Use    Vaping status: Never Used   Substance and Sexual Activity    Alcohol use: Yes     Alcohol/week: 1.0 standard drink of alcohol     Types: 1 Glasses of wine per week     Comment: occassionally    Drug use: No    Sexual activity: Defer     Partners: Male     Birth control/protection: OCP       Family History   Problem Relation Age of Onset     Arthritis Mother         Osteoarthritis    Thyroid disease Mother     Hypertension Father     Pancreatitis Father         GB complication - 1/2 removed    Hyperlipidemia Father     Diabetes Father     Other Sister         Hypoglycemia    Hypothyroidism Brother     Uterine cancer Maternal Grandmother     Alzheimer's disease Maternal Grandfather     Prostate cancer Maternal Grandfather     Arthritis Paternal Grandmother         Osteoarthritis    Prostate cancer Paternal Grandfather        Review of Systems   Constitutional: Negative for decreased appetite, fever, malaise/fatigue and weight loss.   HENT:  Negative for nosebleeds.    Eyes:  Negative for double vision.   Cardiovascular:  Negative for chest pain, claudication, cyanosis, dyspnea on exertion, irregular heartbeat, leg swelling, near-syncope, orthopnea, palpitations, paroxysmal nocturnal dyspnea and syncope.   Respiratory:  Negative for cough, hemoptysis and shortness of breath.    Hematologic/Lymphatic: Negative for bleeding problem.   Skin:  Negative for rash.   Musculoskeletal:  Negative for falls and myalgias.   Gastrointestinal:  Negative for hematochezia, jaundice, melena, nausea and vomiting.   Genitourinary:  Negative for hematuria.   Neurological:  Negative for dizziness and seizures.   Psychiatric/Behavioral:  Negative for altered mental status and memory loss.        Allergies   Allergen Reactions    Bee Venom Hives, Itching and Other (See Comments)     Localized reaction redness    Wasp Venom Protein Hives and Itching     Localized reaction redness         Current Outpatient Medications:     albuterol sulfate  (90 Base) MCG/ACT inhaler, INHALE 2 PUFFS EVERY 4 HOURS AS NEEDED FOR WHEEZING OR SHORTNESS OF AIR, Disp: 18 g, Rfl: 3    ALPRAZolam (XANAX) 0.5 MG tablet, TAKE 0.5 TO ONE TABLET TWICE A DAY AS NEEDED FOR ANXIETY, Disp: 30 tablet, Rfl: 0    Ascorbic Acid (Vitamin C) 500 MG capsule, , Disp: , Rfl:     aspirin 81 MG EC tablet, Take 1  "tablet by mouth Daily., Disp: , Rfl:     Auvi-Q 0.3 MG/0.3ML solution auto-injector injection, , Disp: , Rfl:     azelastine (ASTELIN) 0.1 % nasal spray, , Disp: , Rfl:     Breztri Aerosphere 160-9-4.8 MCG/ACT aerosol inhaler, , Disp: , Rfl:     budesonide (PULMICORT) 0.5 MG/2ML nebulizer solution, , Disp: , Rfl:     drospirenone-ethinyl estradiol (MATT,OCELLA) 3-0.03 MG per tablet, TAKE 1 TABLET BY MOUTH EVERY DAY, Disp: 84 tablet, Rfl: 0    DULoxetine (Cymbalta) 30 MG capsule, Take once a day with the Cymbalta 60 mg, Disp: 90 capsule, Rfl: 0    DULoxetine (CYMBALTA) 60 MG capsule, Take once a day with the 30 mg of Cymbalta, Disp: 90 capsule, Rfl: 0    Dupixent 300 MG/2ML solution pen-injector, , Disp: , Rfl:     Erenumab-aooe (Aimovig) 140 MG/ML prefilled syringe, Inject 1 mL under the skin into the appropriate area as directed., Disp: , Rfl:     ferrous sulfate 325 (65 FE) MG tablet, , Disp: , Rfl:     fexofenadine (ALLEGRA) 180 MG tablet, Take 1 tablet by mouth Daily., Disp: , Rfl:     ipratropium-albuterol (DUO-NEB) 0.5-2.5 mg/3 ml nebulizer, Take 3 mL by nebulization Every 4 (Four) Hours As Needed for Wheezing or Shortness of Air., Disp: 360 mL, Rfl: 2    metoprolol succinate XL (TOPROL-XL) 25 MG 24 hr tablet, Take 1 tablet by mouth Daily., Disp: 90 tablet, Rfl: 3    montelukast (SINGULAIR) 10 MG tablet, Take 1 tablet by mouth every night at bedtime., Disp: , Rfl:     naproxen (Naprosyn) 500 MG tablet, Take 1 tablet by mouth 2 (Two) Times a Day With Meals., Disp: 60 tablet, Rfl: 2    Nurtec 75 MG dispersible tablet, Take 1 tablet by mouth., Disp: , Rfl:     OnabotulinumtoxinA 200 units reconstituted solution, Inject 200 Units into the appropriate muscle as directed by prescriber., Disp: , Rfl:     Synthroid 75 MCG tablet, TAKE 1 TABLET BY MOUTH DAILY, Disp: 90 tablet, Rfl: 1      Objective:     Vitals:    04/16/24 1441   BP: 128/94   Pulse: 52   Weight: 102 kg (225 lb 3.2 oz)   Height: 171.5 cm (67.5\") "     Body mass index is 34.75 kg/m².    Constitutional:       Appearance: Well-developed.   Eyes:      General: No scleral icterus.  HENT:      Head: Normocephalic.   Neck:      Thyroid: No thyromegaly.      Vascular: No JVD.      Lymphadenopathy: No cervical adenopathy.   Pulmonary:      Effort: Pulmonary effort is normal.      Breath sounds: Normal breath sounds. No wheezing. No rales.   Cardiovascular:      Normal rate. Regular rhythm.      No gallop.    Edema:     Peripheral edema absent.   Abdominal:      Palpations: Abdomen is soft.      Tenderness: There is no abdominal tenderness.   Musculoskeletal: Normal range of motion. Skin:     General: Skin is warm and dry.      Findings: No rash.   Neurological:      Mental Status: Alert and oriented to person, place, and time.           ECG 12 Lead    Date/Time: 4/16/2024 3:38 PM  Performed by: Shashi Walter MD    Authorized by: Shashi Walter MD  Comparison: compared with previous ECG   Rhythm: sinus rhythm    Clinical impression: normal ECG           Assessment:       Diagnosis Plan   1. Post-COVID syndrome        2. COVID-19 long hauler                 Plan:       Well I am pretty excited that she is feeling better it may be a combination of the Naprosyn and the metoprolol and I think we should try a little bit more metoprolol and then increase it to 50 mg a day and then I am going to have her come back and see me in 3 months.  I did fill out a lot of forms I think she should be considered completely disabled from this for right now I think if she gets better she definitely will go back to work.  I do feel like she has autonomic dysfunction even though the tilt table was not diagnostic for POTS her heart rate is way too fast for her.  I did discuss with her about general diet things to try and help with her weight loss I think that could help with this to possibly when she comes back in 3 months and when to check her for some inflammatory markers    Return in  about 3 months (around 7/16/2024).     As always, it has been a pleasure to participate in your patient's care.      Sincerely,       Shashi Walter MD

## 2024-04-22 ENCOUNTER — OFFICE VISIT (OUTPATIENT)
Dept: FAMILY MEDICINE CLINIC | Facility: CLINIC | Age: 44
End: 2024-04-22
Payer: COMMERCIAL

## 2024-04-22 VITALS
HEIGHT: 68 IN | SYSTOLIC BLOOD PRESSURE: 120 MMHG | TEMPERATURE: 97.5 F | DIASTOLIC BLOOD PRESSURE: 82 MMHG | WEIGHT: 225 LBS | OXYGEN SATURATION: 100 % | HEART RATE: 80 BPM | RESPIRATION RATE: 15 BRPM | BODY MASS INDEX: 34.1 KG/M2

## 2024-04-22 DIAGNOSIS — F41.1 GAD (GENERALIZED ANXIETY DISORDER): ICD-10-CM

## 2024-04-22 DIAGNOSIS — E03.9 ACQUIRED HYPOTHYROIDISM: ICD-10-CM

## 2024-04-22 DIAGNOSIS — U09.9 COVID-19 LONG HAULER: ICD-10-CM

## 2024-04-22 DIAGNOSIS — U09.9 POST-COVID SYNDROME: Primary | ICD-10-CM

## 2024-04-22 PROCEDURE — 99214 OFFICE O/P EST MOD 30 MIN: CPT | Performed by: PHYSICIAN ASSISTANT

## 2024-04-22 NOTE — LETTER
April 22, 2024     Patient: Ni Rodríguez   YOB: 1980   Date of Visit: 4/22/2024       To Whom It May Concern:         It is  my medical opinion that Ni Rodríguez may have intermittent leave due to her long hauler COVID symptoms.  Ni may have flare ups of increased fatigue, breathing issues and heart palpitations.  Her symptoms may be exacerbated by exposure to illnesses and possibly weather changes.  She will be evaluated monthly.  I expect her condition may last several months but is undetermined at this time.           Sincerely,        Miri Deshpande PA-C    CC: No Recipients

## 2024-04-22 NOTE — PROGRESS NOTES
"Chief Complaint  Anxiety (1 month follow up ) and covid long hauler  (FMLA for this month )    Subjective          Ni Rodríguez presents to [facility]  History of Present Illness  Ni is a 43-year-old female who presents for anxiety and hauler COVID and post-COVID symptoms.  States she is doing okay currently.  States she knows now what she can do and what she cannot do.  If she overdoes it, she is tired and does not feel well for several days.  She has been taking her medication as directed.  Saw her cardiologist recently who recommended repeating her IgE levels.  States this has not been retested since she started the Dupixent medication.  The medication has helped with her symptoms.  Has upcoming appointment with pulmonologist later this week.  Has not seen the [facility] recently.  Sleep has been normal.  Denied any current chest pain, shortness of air, cough, wheezing, abdominal pain or GI upset.  Has good support at home.     Objective   Vital Signs:   /82 (BP Location: Left arm, Patient Position: Sitting, Cuff Size: Adult)   Pulse 80   Temp 97.5 °F (36.4 °C)   Resp 15   Ht 171.5 cm (67.5\")   Wt 102 kg (225 lb)   SpO2 100%   BMI 34.72 kg/m²     Physical Exam  Vitals and nursing note reviewed.   Constitutional:       Appearance: Normal appearance. She is well-developed and well-groomed. She is obese.   HENT:      Head: Normocephalic and atraumatic.   Neck:      Thyroid: No thyroid mass, thyromegaly or thyroid tenderness.      Vascular: No carotid bruit.      Trachea: Trachea and phonation normal. No tracheal tenderness.   Cardiovascular:      Rate and Rhythm: Normal rate and regular rhythm.      Pulses: Normal pulses.      Heart sounds: Normal heart sounds, S1 normal and S2 normal. No murmur heard.  Pulmonary:      Effort: Pulmonary effort is normal.      Breath sounds: Normal breath sounds and air entry.   Abdominal:      General: Bowel sounds are normal.      " Palpations: Abdomen is soft. There is no hepatomegaly.      Tenderness: There is no abdominal tenderness. There is no right CVA tenderness, left CVA tenderness, guarding or rebound. Negative signs include Berg's sign, Rovsing's sign, McBurney's sign, psoas sign and obturator sign.   Musculoskeletal:      Cervical back: Neck supple.      Right lower leg: No edema.      Left lower leg: No edema.   Skin:     General: Skin is warm and dry.      Capillary Refill: Capillary refill takes less than 2 seconds.   Neurological:      Mental Status: She is alert and oriented to person, place, and time.   Psychiatric:         Attention and Perception: Attention and perception normal.         Mood and Affect: Mood and affect normal.         Speech: Speech normal.         Behavior: Behavior normal. Behavior is cooperative.         Thought Content: Thought content normal.         Cognition and Memory: Cognition and memory normal.         Judgment: Judgment normal.        Result Review :                 Assessment and Plan    Diagnoses and all orders for this visit:    1. Post-COVID syndrome (Primary)  -     IgE  -     C-reactive Protein    2. COVID-19 long hauler  -     IgE  -     C-reactive Protein    3. HERBERT (generalized anxiety disorder)    4. Acquired hypothyroidism  -     Thyroid Panel With TSH    Chronic post COVID syndrome with COVID-19 long-hauler: I will recheck her IgG and CRP test.  She will be notified of test results when completed.  Will keep her follow-up appointment with her specialist.  Currently stable.  Have provided her a work note.  Chronic and stable generalized anxiety disorder: Doing well with her Xanax medication and Cymbalta medications.  No refills are needed.  Chronic and stable hypothyroidism: Will check thyroid profile with TSH today.  She will be notified of test results and any medication changes.    I spent 22 minutes caring for Ni on this date of service. This time includes time spent by me in  the following activities:preparing for the visit, obtaining and/or reviewing a separately obtained history, performing a medically appropriate examination and/or evaluation , counseling and educating the patient/family/caregiver, ordering medications, tests, or procedures, and documenting information in the medical record  Follow Up   Return if symptoms worsen or fail to improve.  Patient was given instructions and counseling regarding her condition or for health maintenance advice. Please see specific information pulled into the AVS if appropriate.     BELINDA Munoz [facility]  [address] [place] [id number]  Dept: [phone]  Dept Fax: [phone]  Loc: [phone]  Loc Fax: [phone]

## 2024-04-24 DIAGNOSIS — F41.1 GAD (GENERALIZED ANXIETY DISORDER): ICD-10-CM

## 2024-04-24 RX ORDER — ALPRAZOLAM 0.5 MG/1
TABLET ORAL
Qty: 30 TABLET | OUTPATIENT
Start: 2024-04-24

## 2024-04-24 NOTE — TELEPHONE ENCOUNTER
Hub to share     Left pt v/m to call back to schedule a lab only appointment to leave a urine sample for her urine drug screen, and will need to sign the controlled substance contract.

## 2024-04-24 NOTE — TELEPHONE ENCOUNTER
----- Message from Miri Deshpande PA-C sent at 4/24/2024  9:38 AM EDT -----  Please call patient and see if she can come by office today for a urine drug screen and forms to be completed for her Xanax medication.

## 2024-04-25 DIAGNOSIS — Z79.899 HIGH RISK MEDICATION USE: ICD-10-CM

## 2024-04-25 DIAGNOSIS — F41.1 GAD (GENERALIZED ANXIETY DISORDER): Primary | ICD-10-CM

## 2024-04-27 LAB
CRP SERPL-MCNC: 0.61 MG/DL (ref 0–0.5)
FT4I SERPL CALC-MCNC: 2.3 (ref 1.2–4.9)
IGE SERPL-ACNC: 259 IU/ML (ref 6–495)
T3RU NFR SERPL: 23 % (ref 24–39)
T4 SERPL-MCNC: 9.9 UG/DL (ref 4.5–12)
TSH SERPL DL<=0.005 MIU/L-ACNC: 2.5 UIU/ML (ref 0.45–4.5)

## 2024-05-08 DIAGNOSIS — F41.1 GAD (GENERALIZED ANXIETY DISORDER): ICD-10-CM

## 2024-05-08 RX ORDER — ALPRAZOLAM 0.5 MG/1
TABLET ORAL
Qty: 30 TABLET | Refills: 0 | Status: SHIPPED | OUTPATIENT
Start: 2024-05-08

## 2024-05-22 ENCOUNTER — TELEPHONE (OUTPATIENT)
Dept: CARDIOLOGY | Facility: CLINIC | Age: 44
End: 2024-05-22
Payer: COMMERCIAL

## 2024-05-22 NOTE — TELEPHONE ENCOUNTER
Dr. Cameron with the [employer] is calling requesting to speak with Dr. Walter.  He says it doesn't have to be today, and could be one day this week.  His callback is [phone].    Thank you,    Emily MUELLER RN  Triage [facility]  05/22/24 14:02 EDT

## 2024-05-28 ENCOUNTER — TELEPHONE (OUTPATIENT)
Dept: FAMILY MEDICINE CLINIC | Facility: CLINIC | Age: 44
End: 2024-05-28

## 2024-06-25 ENCOUNTER — OFFICE VISIT (OUTPATIENT)
Dept: FAMILY MEDICINE CLINIC | Facility: CLINIC | Age: 44
End: 2024-06-25
Payer: COMMERCIAL

## 2024-06-25 VITALS
HEART RATE: 85 BPM | DIASTOLIC BLOOD PRESSURE: 74 MMHG | OXYGEN SATURATION: 98 % | TEMPERATURE: 97.8 F | SYSTOLIC BLOOD PRESSURE: 110 MMHG | HEIGHT: 68 IN | WEIGHT: 224 LBS | BODY MASS INDEX: 33.95 KG/M2

## 2024-06-25 DIAGNOSIS — U09.9 COVID-19 LONG HAULER: ICD-10-CM

## 2024-06-25 DIAGNOSIS — F41.1 GAD (GENERALIZED ANXIETY DISORDER): ICD-10-CM

## 2024-06-25 DIAGNOSIS — J45.909 MODERATE ASTHMA, UNSPECIFIED WHETHER COMPLICATED, UNSPECIFIED WHETHER PERSISTENT: ICD-10-CM

## 2024-06-25 DIAGNOSIS — E03.9 ACQUIRED HYPOTHYROIDISM: Primary | ICD-10-CM

## 2024-06-25 PROCEDURE — 99214 OFFICE O/P EST MOD 30 MIN: CPT | Performed by: FAMILY MEDICINE

## 2024-06-25 RX ORDER — LEVOTHYROXINE SODIUM 75 MCG
75 TABLET ORAL DAILY
Qty: 90 TABLET | Refills: 1 | Status: SHIPPED | OUTPATIENT
Start: 2024-06-25

## 2024-06-25 RX ORDER — DULOXETIN HYDROCHLORIDE 60 MG/1
CAPSULE, DELAYED RELEASE ORAL
Qty: 90 CAPSULE | Refills: 1 | Status: SHIPPED | OUTPATIENT
Start: 2024-06-25

## 2024-06-25 RX ORDER — METOPROLOL SUCCINATE 50 MG/1
1 TABLET, EXTENDED RELEASE ORAL DAILY
COMMUNITY
Start: 2024-04-16 | End: 2024-06-25 | Stop reason: SDUPTHER

## 2024-06-25 NOTE — PROGRESS NOTES
"  Subjective   Ni Rodríguez is a 43 y.o. female who is here for   Chief Complaint   Patient presents with    [facility]    Med Refill   .  Ni Rodríguez presents to the office to establish care.  Patient has a history of long COVID, migraines, hypothyroidism, anxiety. and asthma.    Patient states that she started having migraines after developing COVID in 2020.  Patient is currently on Aimovig monthly, Nurtec for as needed relief of migraines and Botox.  Patient states these are stable at this time.    Patient also has a longstanding history of hypothyroidism.  She is currently on Synthroid 75 mcg daily.  Symptoms have been relatively controlled since recent increase of medication.  Last TSH was normal back in April 2024.    Patient has history of asthma.  She is currently taking Breztri, Dupixent, Pulmicort, and montelukast.  Patient states this has been stable.  Anxiety  Presents for follow-up visit.  Symptoms include depressed mood and excessive worry.  Patient reports no chest pain, insomnia, palpitations, panic, restlessness or shortness of breath. The severity of symptoms is moderate. Treatments tried: duloxetine 60mg po daily and alprazolam 0.5 mg prn severe anxiety.   Additional comments: Patient states her anxiety has improved significantly since recently being approved for disability secondary to long COVID.       Review of Systems   Constitutional:  Negative for activity change, appetite change, chills, fatigue and fever.   Respiratory:  Negative for cough and shortness of breath.    Cardiovascular:  Negative for chest pain, palpitations and leg swelling.   Gastrointestinal:  Negative for abdominal pain.   Psychiatric/Behavioral:  The patient does not have insomnia.        Objective   Vitals:    06/25/24 0921   BP: 110/74   Pulse: 85   Temp: 97.8 °F (36.6 °C)   SpO2: 98%   Weight: 102 kg (224 lb)   Height: 171.5 cm (67.52\")      Physical Exam  Vitals and nursing note reviewed.   Constitutional:      "  Appearance: Normal appearance. She is normal weight.   HENT:      Head: Normocephalic and atraumatic.   Cardiovascular:      Rate and Rhythm: Normal rate and regular rhythm.      Pulses: Normal pulses.      Heart sounds: No murmur heard.  Pulmonary:      Effort: Pulmonary effort is normal. No respiratory distress.      Breath sounds: Normal breath sounds. No wheezing.   Skin:     General: Skin is warm and dry.   Neurological:      General: No focal deficit present.      Mental Status: She is alert.   Psychiatric:         Mood and Affect: Mood normal.         Thought Content: Thought content normal.         Assessment & Plan   Diagnoses and all orders for this visit:    1. Acquired hypothyroidism (Primary)  Currently doing well with namebrand Synthroid 75 mcg daily.  Will check TSH in 3 months.  -     Synthroid 75 MCG tablet; Take 1 tablet by mouth Daily.  Dispense: 90 tablet; Refill: 1    2. COVID-19 long hauler  Stable.  Patient is to continue current medications. reviewed records from specialists.  -     DULoxetine (CYMBALTA) 60 MG capsule; Take once a day with the 30 mg of Cymbalta  Dispense: 90 capsule; Refill: 1    3. HERBERT (generalized anxiety disorder)  Continue duloxetine 60 mg p.o. daily.  Will use as needed alprazolam as needed.  Discussed possibly weaning off.  Patient is agreeable with this plan.  PDMP reviewed.  Urine drug screen appropriate.  -     DULoxetine (CYMBALTA) 60 MG capsule; Take once a day with the 30 mg of Cymbalta  Dispense: 90 capsule; Refill: 1    4. Moderate asthma, unspecified whether complicated, unspecified whether persistent  Stable.  Continue current medications.      There are no Patient Instructions on file for this visit.    Medications Discontinued During This Encounter   Medication Reason    metoprolol succinate XL (TOPROL-XL) 50 MG 24 hr tablet Duplicate order    DULoxetine (Cymbalta) 30 MG capsule *Therapy completed    naproxen (Naprosyn) 500 MG tablet *Therapy completed     Synthroid 75 MCG tablet Reorder    DULoxetine (CYMBALTA) 60 MG capsule Reorder        Return in about 3 months (around 9/25/2024), or if symptoms worsen or fail to improve, for Hypothyroidism.    Salvador Engle MD  [place], [place]

## 2024-07-16 ENCOUNTER — OFFICE VISIT (OUTPATIENT)
Dept: CARDIOLOGY | Facility: CLINIC | Age: 44
End: 2024-07-16
Payer: COMMERCIAL

## 2024-07-16 VITALS
DIASTOLIC BLOOD PRESSURE: 88 MMHG | HEIGHT: 67 IN | SYSTOLIC BLOOD PRESSURE: 110 MMHG | WEIGHT: 227.8 LBS | BODY MASS INDEX: 35.75 KG/M2 | HEART RATE: 94 BPM

## 2024-07-16 DIAGNOSIS — G90.9 AUTONOMIC DYSFUNCTION: Primary | ICD-10-CM

## 2024-07-16 DIAGNOSIS — R00.0 TACHYCARDIA: ICD-10-CM

## 2024-07-16 DIAGNOSIS — U09.9 COVID-19 LONG HAULER: ICD-10-CM

## 2024-07-16 PROCEDURE — 93000 ELECTROCARDIOGRAM COMPLETE: CPT | Performed by: INTERNAL MEDICINE

## 2024-07-16 PROCEDURE — 99214 OFFICE O/P EST MOD 30 MIN: CPT | Performed by: INTERNAL MEDICINE

## 2024-07-16 NOTE — PROGRESS NOTES
Date of Office Visit: 24  Encounter Provider: Shashi Walter MD  Place of Service: [facility] CARDIOLOGY  Patient Name: Ni Rodríguez  :1980  [id number]    Chief Complaint   Patient presents with    Hypertension   :     HPI: Ni Rodríguez is a 44 y.o. female Who has had COVID-19 and been afflicted with long COVID syndrome.  She previously healthy woman got COVID and everything has kind of fallen apart she has migraines she has asthma she has lots of tachycardia she has been diagnosed as having long COVID.  She has gained about 30 to 40 pounds.  She had a normal coronary CT angiogram in 2023.  She has had normal echocardiography.  Holter monitors have been fairly unremarkable.  Looking back at her labs she has always had an elevated CRP and a mildly elevated sed rate.  I see 1 place where she had a high IgE level in 2023 she got put on Dupixent after that and does not have that followed up she had a normal interleukin-6 level.  She went to the [facility] they told her she had long COVID she may have pots disease.  They had tried her on Corlanor for her heart rate and she is also on Cymbalta.  She did not feel like it made much change and so when I saw her last month we tried her on some Ranexa and also Cardizem because I feel like she truly has angina times.  And that also has not helped.    She is here for follow-up and in general is feeling better.  She stopped her Naprosyn.  She feels like she has less chest discomfort she feels like maybe her breathing is a little bit better she had a tough weekend she was camping with a bunch of 8 nine 10-year-old and it was really hot so she is got pretty tired but in general she is feels like maybe she is a little bit better vagal stimulator that she heard about from the [facility] feels like that that helped her    Past Medical History:   Diagnosis Date    Anxiety 2020    TBI    Asthma     Bright red blood per  rectum     COVID     Depression 2020    TBI    Disease of thyroid gland     Environmental allergies     History of medical problems 2022    [facility]    History of prior pregnancies      3    HL (hearing loss) 2018    Hypothyroidism unsure    Migraine     Miscarriage     X 1    URI (upper respiratory infection)        History reviewed. No pertinent surgical history.    Social History     Socioeconomic History    Marital status:     Number of children: 2    Years of education: masters in education   Tobacco Use    Smoking status: Never    Smokeless tobacco: Never   Vaping Use    Vaping status: Never Used   Substance and Sexual Activity    Alcohol use: Yes     Alcohol/week: 2.0 standard drinks of alcohol     Types: 2 Cans of beer per week     Comment: occassionally    Drug use: No    Sexual activity: Yes     Partners: Male     Birth control/protection: Birth control pill       Family History   Problem Relation Age of Onset    Arthritis Mother         Osteoarthritis    Thyroid disease Mother     Hypertension Father     Pancreatitis Father         GB complication -  removed    Hyperlipidemia Father     Diabetes Father     Other Sister         Hypoglycemia    Hypothyroidism Brother     Uterine cancer Maternal Grandmother     Alzheimer's disease Maternal Grandfather     Prostate cancer Maternal Grandfather     Arthritis Paternal Grandmother         Osteoarthritis    Prostate cancer Paternal Grandfather        Review of Systems   Constitutional: Negative for decreased appetite, fever, malaise/fatigue and weight loss.   HENT:  Negative for nosebleeds.    Eyes:  Negative for double vision.   Cardiovascular:  Negative for chest pain, claudication, cyanosis, dyspnea on exertion, irregular heartbeat, leg swelling, near-syncope, orthopnea, palpitations, paroxysmal nocturnal dyspnea and syncope.   Respiratory:  Negative for cough, hemoptysis and shortness of breath.    Hematologic/Lymphatic: Negative for  bleeding problem.   Skin:  Negative for rash.   Musculoskeletal:  Negative for falls and myalgias.   Gastrointestinal:  Negative for hematochezia, jaundice, melena, nausea and vomiting.   Genitourinary:  Negative for hematuria.   Neurological:  Negative for dizziness and seizures.   Psychiatric/Behavioral:  Negative for altered mental status and memory loss.        Allergies   Allergen Reactions    Bee Venom Hives, Itching and Other (See Comments)     Localized reaction redness    Wasp Venom Protein Hives and Itching     Localized reaction redness         Current Outpatient Medications:     albuterol sulfate  (90 Base) MCG/ACT inhaler, INHALE 2 PUFFS EVERY 4 HOURS AS NEEDED FOR WHEEZING OR SHORTNESS OF AIR, Disp: 18 g, Rfl: 3    ALPRAZolam (XANAX) 0.5 MG tablet, Take 0.5 to one tablet twice a day as needed for anxiety, Disp: 30 tablet, Rfl: 0    Ascorbic Acid (Vitamin C) 500 MG capsule, , Disp: , Rfl:     Auvi-Q 0.3 MG/0.3ML solution auto-injector injection, , Disp: , Rfl:     azelastine (ASTELIN) 0.1 % nasal spray, , Disp: , Rfl:     Breztri Aerosphere 160-9-4.8 MCG/ACT aerosol inhaler, , Disp: , Rfl:     budesonide (PULMICORT) 0.5 MG/2ML nebulizer solution, , Disp: , Rfl:     drospirenone-ethinyl estradiol (MATT,OCELLA) 3-0.03 MG per tablet, TAKE 1 TABLET BY MOUTH EVERY DAY, Disp: 84 tablet, Rfl: 0    DULoxetine (CYMBALTA) 60 MG capsule, Take once a day with the 30 mg of Cymbalta, Disp: 90 capsule, Rfl: 1    Dupixent 300 MG/2ML solution pen-injector, , Disp: , Rfl:     Erenumab-aooe (Aimovig) 140 MG/ML prefilled syringe, Inject 1 mL under the skin into the appropriate area as directed., Disp: , Rfl:     ferrous sulfate 325 (65 FE) MG tablet, , Disp: , Rfl:     fexofenadine (ALLEGRA) 180 MG tablet, Take 1 tablet by mouth Daily., Disp: , Rfl:     ipratropium-albuterol (DUO-NEB) 0.5-2.5 mg/3 ml nebulizer, Take 3 mL by nebulization Every 4 (Four) Hours As Needed for Wheezing or Shortness of Air., Disp: 360  "mL, Rfl: 2    metoprolol succinate XL (TOPROL-XL) 50 MG 24 hr tablet, Take 1 tablet by mouth Daily., Disp: 90 tablet, Rfl: 3    montelukast (SINGULAIR) 10 MG tablet, Take 1 tablet by mouth every night at bedtime., Disp: , Rfl:     Nurtec 75 MG dispersible tablet, Take 1 tablet by mouth., Disp: , Rfl:     OnabotulinumtoxinA 200 units reconstituted solution, Inject 200 Units into the appropriate muscle as directed by prescriber., Disp: , Rfl:     Synthroid 75 MCG tablet, Take 1 tablet by mouth Daily., Disp: 90 tablet, Rfl: 1      Objective:     Vitals:    07/16/24 1451   BP: 110/88   Pulse: 94   Weight: 103 kg (227 lb 12.8 oz)   Height: 170.2 cm (67\")     Body mass index is 35.68 kg/m².    Constitutional:       Appearance: Well-developed.   Eyes:      General: No scleral icterus.  HENT:      Head: Normocephalic.   Neck:      Thyroid: No thyromegaly.      Vascular: No JVD.      Lymphadenopathy: No cervical adenopathy.   Pulmonary:      Effort: Pulmonary effort is normal.      Breath sounds: Normal breath sounds. No wheezing. No rales.   Cardiovascular:      Normal rate. Regular rhythm.      No gallop.    Edema:     Peripheral edema absent.   Abdominal:      Palpations: Abdomen is soft.      Tenderness: There is no abdominal tenderness.   Musculoskeletal: Normal range of motion. Skin:     General: Skin is warm and dry.      Findings: No rash.   Neurological:      Mental Status: Alert and oriented to person, place, and time.           ECG 12 Lead    Date/Time: 7/16/2024 3:40 PM  Performed by: Shashi Walter MD    Authorized by: Shashi Walter MD  Comparison: compared with previous ECG   Similar to previous ECG  Rhythm: sinus rhythm    Clinical impression: normal ECG           Assessment:       Diagnosis Plan   1. Autonomic dysfunction        2. COVID-19 long hauler        3. Tachycardia                 Plan:       I think she is a little bit better.  I am convinced she has long COVID with autonomic dysfunction.  " Although when I listen to her heart when she was laying down her heart sped up and slow down like we would here with the healthy heart I do not recall hearing that before.  She is off the Naprosyn we will get a stop the aspirin she will long discussion with me about GLP-1 drugs which I think would be good for her but they are not covered so then we talked about that.  But right now I am just can have her come back in 6 months I think she is a little bit better    No follow-ups on file.     As always, it has been a pleasure to participate in your patient's care.      Sincerely,       Shashi Walter MD

## 2024-08-09 ENCOUNTER — DOCUMENTATION (OUTPATIENT)
Dept: SLEEP MEDICINE | Facility: HOSPITAL | Age: 44
End: 2024-08-09
Payer: COMMERCIAL

## 2024-08-09 NOTE — PROGRESS NOTES
Date 8/9/2024       following message conveyed to me by sleep staff today:  Aug 3 at 11:21 AM  I have noticed in the past two weeks, my machine is literally filling my body with air. Every time I turn over while sleeping, it results in a large belch and painful gas in the morning. What would have resulted in this sudden change and how do I remedy it?        - 1/18/2024 sleep clinic notes reviewed    - Likely aerophagia which is benign and self-limiting:    I have instructed sleep staff to increase her EPR to 2 to help resolve.  Patient should also be offered sooner follow-up in sleep clinic to review therapy if increased EPR does not alleviate for clinical correlation.  No further setting changes without being seen in sleep clinic for clinical correlation.      NPI #: [id number]    Rosie Fajardo, DO  Sleep Medicine  [facility]  08/09/24

## 2024-08-14 ENCOUNTER — DOCUMENTATION (OUTPATIENT)
Dept: SLEEP MEDICINE | Facility: HOSPITAL | Age: 44
End: 2024-08-14
Payer: COMMERCIAL

## 2024-08-14 NOTE — PROGRESS NOTES
Spoke with patient, made changes per Dr. Fajardo's request.   Changes made in AIRVIEW    08/14/2024, 08:26 AM    by Josh Joshi    Settings sent to device    Request [id number]    Set Mode to AutoSet for Her  Set Min Pressure to 8 cmH2O  Set Max Pressure to 16 cmH2O  Set EPR to Ramp Only  Set EPR level to 2  Set Ramp enable to On  Set Ramp time to 20 min  Set Start pressure to 6.0 cmH2O  Set Climate Control to Manual  Set Humidifier level to 4  Set Tube temperature to 80°F (27°C)

## 2024-09-12 ENCOUNTER — OFFICE VISIT (OUTPATIENT)
Dept: SLEEP MEDICINE | Facility: HOSPITAL | Age: 44
End: 2024-09-12
Payer: COMMERCIAL

## 2024-09-12 VITALS
WEIGHT: 207 LBS | HEIGHT: 67 IN | HEART RATE: 100 BPM | BODY MASS INDEX: 32.49 KG/M2 | OXYGEN SATURATION: 95 % | DIASTOLIC BLOOD PRESSURE: 70 MMHG | SYSTOLIC BLOOD PRESSURE: 97 MMHG

## 2024-09-12 DIAGNOSIS — G47.33 OBSTRUCTIVE SLEEP APNEA, ADULT: Primary | ICD-10-CM

## 2024-09-12 DIAGNOSIS — F41.1 GAD (GENERALIZED ANXIETY DISORDER): ICD-10-CM

## 2024-09-12 DIAGNOSIS — E66.9 CLASS 1 OBESITY WITH SERIOUS COMORBIDITY AND BODY MASS INDEX (BMI) OF 32.0 TO 32.9 IN ADULT, UNSPECIFIED OBESITY TYPE: ICD-10-CM

## 2024-09-12 PROCEDURE — G0463 HOSPITAL OUTPT CLINIC VISIT: HCPCS

## 2024-09-13 ENCOUNTER — PATIENT MESSAGE (OUTPATIENT)
Dept: FAMILY MEDICINE CLINIC | Facility: CLINIC | Age: 44
End: 2024-09-13
Payer: COMMERCIAL

## 2024-09-17 RX ORDER — DULOXETIN HYDROCHLORIDE 30 MG/1
30 CAPSULE, DELAYED RELEASE ORAL DAILY
Qty: 90 CAPSULE | Refills: 0 | Status: SHIPPED | OUTPATIENT
Start: 2024-09-17

## 2024-09-17 RX ORDER — DULOXETIN HYDROCHLORIDE 30 MG/1
30 CAPSULE, DELAYED RELEASE ORAL DAILY
Qty: 30 CAPSULE | Refills: 1 | Status: SHIPPED | OUTPATIENT
Start: 2024-09-17 | End: 2024-09-17 | Stop reason: SDUPTHER

## 2024-09-24 ENCOUNTER — OFFICE VISIT (OUTPATIENT)
Dept: FAMILY MEDICINE CLINIC | Facility: CLINIC | Age: 44
End: 2024-09-24
Payer: COMMERCIAL

## 2024-09-24 VITALS
WEIGHT: 206 LBS | HEART RATE: 90 BPM | SYSTOLIC BLOOD PRESSURE: 108 MMHG | DIASTOLIC BLOOD PRESSURE: 74 MMHG | HEIGHT: 67 IN | BODY MASS INDEX: 32.33 KG/M2 | OXYGEN SATURATION: 96 % | TEMPERATURE: 97 F

## 2024-09-24 DIAGNOSIS — R53.82 CHRONIC FATIGUE: Primary | ICD-10-CM

## 2024-09-24 DIAGNOSIS — E03.9 ACQUIRED HYPOTHYROIDISM: ICD-10-CM

## 2024-09-24 DIAGNOSIS — U09.9 POST-COVID SYNDROME: ICD-10-CM

## 2024-09-24 PROCEDURE — 99213 OFFICE O/P EST LOW 20 MIN: CPT | Performed by: FAMILY MEDICINE

## 2024-09-24 RX ORDER — TIRZEPATIDE 5 MG/.5ML
5 INJECTION, SOLUTION SUBCUTANEOUS WEEKLY
COMMUNITY
Start: 2024-08-09

## 2024-09-24 RX ORDER — METOPROLOL SUCCINATE 25 MG/1
1 TABLET, EXTENDED RELEASE ORAL DAILY
COMMUNITY
Start: 2024-07-27

## 2024-09-24 RX ORDER — RANOLAZINE 500 MG/1
1 TABLET, EXTENDED RELEASE ORAL EVERY 12 HOURS SCHEDULED
COMMUNITY
Start: 2024-07-27

## 2024-09-25 LAB
FT4I SERPL CALC-MCNC: 2.1 (ref 1.2–4.9)
T3RU NFR SERPL: 25 % (ref 24–39)
T4 SERPL-MCNC: 8.5 UG/DL (ref 4.5–12)
TSH SERPL DL<=0.005 MIU/L-ACNC: 2.07 UIU/ML (ref 0.45–4.5)

## 2024-11-07 ENCOUNTER — OFFICE VISIT (OUTPATIENT)
Dept: SLEEP MEDICINE | Facility: HOSPITAL | Age: 44
End: 2024-11-07
Payer: COMMERCIAL

## 2024-11-07 VITALS
WEIGHT: 198 LBS | DIASTOLIC BLOOD PRESSURE: 68 MMHG | OXYGEN SATURATION: 96 % | SYSTOLIC BLOOD PRESSURE: 100 MMHG | BODY MASS INDEX: 31.08 KG/M2 | HEART RATE: 96 BPM | HEIGHT: 67 IN

## 2024-11-07 DIAGNOSIS — Z72.821 INADEQUATE SLEEP HYGIENE: ICD-10-CM

## 2024-11-07 DIAGNOSIS — G89.29 OTHER CHRONIC PAIN: ICD-10-CM

## 2024-11-07 DIAGNOSIS — G47.33 OBSTRUCTIVE SLEEP APNEA, ADULT: Primary | ICD-10-CM

## 2024-11-07 DIAGNOSIS — E66.811 CLASS 1 OBESITY WITH SERIOUS COMORBIDITY AND BODY MASS INDEX (BMI) OF 31.0 TO 31.9 IN ADULT, UNSPECIFIED OBESITY TYPE: ICD-10-CM

## 2024-11-07 PROCEDURE — G0463 HOSPITAL OUTPT CLINIC VISIT: HCPCS

## 2024-11-07 NOTE — PROGRESS NOTES
[facility]  [address]  [address]  [place] [address]  Phone   Fax     SLEEP CLINIC FOLLOW UP PROGRESS NOTE.    Ni Rodríguez  [id number]   1980  44 y.o.  female      PCP: Salvador Engle MD      Date of visit: 11/7/2024    Chief Complaint   Patient presents with    Sleep Apnea       Medications and allergies are reviewed by me and documented in the encounter.     SOCIAL (habits pertaining to sleep medicine)  History tobacco use:No   History of alcohol use: 0 per week  Caffeine use: 1 beverages/d    HPI:  This is a 44 y.o. PMHx HERBERT (xanax 0.5 prn has not needed lastely), Asthma, Tachycardia unspecified (on metoprolol for same follows with Cardiology no planned interventions - states secondary to long CoVID per patient history in clinic today) . Here for management of Obstructive Sleep Apnea (SENA 7.6 on 7/27/23 HST) Patient is using positive airway pressure therapy and the symptoms of sleep apnea have improved significantly on the therapy. Normally patient goes to bed at 1020 PM and wakes up at 8 AM .  The patient wakes up 5 time(s) during the night and has no problem going back to sleep.  Feels refreshed after waking up.     Overall patient's Impression of their PAP therapy is: Not well   Aerophagia gone away   No air pressure issues solicited  Some centrals on her PAP data feedback and AHI not looking well see below for mask issue  Morning rise time not consistent has some trouble falling asleep at night time chronic pain may contribute -AHI is borderline        -Chronic pain   Not on opiate therapy  Going to [facility] to address with non-pharmacologic management in January      Compliance data as reviewed by me with patient room today:  Date range 10/7/2024 - 11/5/2024  Overall use 87%  4-hour tl 87%  Average days used 8 hours 7 minutes  Device AirSense 11 AutoSet  Settings 8-12.4 cm H2O  EPR 3 full-time  95th percentile pressure 10.6 cm  "H2O  Maximum pressure is 11. 2 cm H2O  95th% leak  10.6 lpm  AHI 4.6 - clinically correlated needs to improve plan as below  (PAP therapy data apnea index feedback central 3.7  0 minutes 0% obstructive 0.6 unknown 0 RERA 0.1)  DME: [employer]  Mask used:FFM  unspefied covers just the bottom tip of her nose she is unable to tell me brand  - not airtfit f20/vitera/orairtouch - too many centrals with this mask    Save what is noted above in HPI, denies any OTHER past known:  cardiopulmonary conditions/neurologic disorders/neuromuscular disorders  Never needed supplemental O2 at home  Denies any opioid therapy  Denies any non-MRI compatible metal/hardware in head/neck/chest    -Obesity still on glp doing well aerophagia resolved  Wt Readings from Last 3 Encounters:   11/07/24 89.8 kg (198 lb)   09/24/24 93.4 kg (206 lb)   09/12/24 93.9 kg (207 lb)           REVIEW OF SYSTEMS:   Is negative unless otherwise noted in HPI  Keller Sleepiness Scale :Total score: 9     Disclaimer History: The above history is based on this sleep physician's in room encounter with the patient. Pre encounter self administered questionnaires are taken into consideration and discussed with patient for any discordance. The above documentation by this sleep physician is the most accurate clinical information determined by in room sleep physician encounter with patient.     PHYSICAL EXAMINATION:  Vitals:    11/07/24 0900   BP: 100/68   Pulse: 96   SpO2: 96%   Weight: 89.8 kg (198 lb)   Height: 170.2 cm (67.01\")    Body mass index is 31 kg/m².   CONSTITUTIONAL: Well appearing, in no overt pain or respiratory distress   NOSE: No septal defect  RESP SYSTEM:  No overt respiratory distress, speaks in clear sentences without dyspnea, no accessory muscle use  CARDIOVASULAR: No edema noted  NEURO: Oriented x 3, no gross focal deficits       - 2/18/2022 echocardiogram cardiologist's interpretation summary: ? Left ventricular ejection fraction appears to be 61 " - 65%. Left ventricular systolic function is normal.  ? Left ventricular diastolic function is consistent with (grade I) impaired relaxation.  ? Normal right ventricular cavity size and systolic function noted.  ? Normal left atrial cavity size noted.  ? Insufficient TR velocity profile to estimate the right ventricular systolic pressure.  ? There is a trivial pericardial effusion.     Compliance data as reviewed by me with patient room today:  Date range 10/7/2024 - 11/5/2024  Overall use 87%  4-hour tl 87%  Average days used 8 hours 7 minutes  Device AirSense 11 AutoSet  Settings 8-12.4 cm H2O  EPR 3 full-time  95th percentile pressure 10.6 cm H2O  Maximum pressure is 11. 2 cm H2O  95th% leak  10.6 lpm  AHI 4.6 - clinically correlated needs to improve plan as below  (PAP therapy data apnea index feedback central 3.7  0 minutes 0% obstructive 0.6 unknown 0 RERA 0.1)  DME: [employer]  Mask used:FFM  unspefied covers just the bottom tip of her nose she is unable to tell me brand  - not airtfit f20/vitera/orairtouch - too many centrals with this mask        ASSESSMENT AND PLAN:  Obstructive sleep apnea ( G 47.33).    -Specific Changes made by me today:  I. After review of compliance data, in visit clinical correlation, and through shared decision making:   Turning EPR off to see if that will eliminate centrals as well also mask addressed as below  (Airview is down today I'm sending the order to DME with instructions to make sure EPR is off)  II.   Order placed for AirFit F20 Full Face Mask - centrals may be mask induced  III. Counseled patient to follow-up with me in 4 months for therapy review.  Counseled may request sooner follow-up to sleep clinic anytime the patient feels necessary.  The symptoms of sleep apnea have improved with the device and the treatment.  Patient's compliance with the device is excellent for treatment of sleep apnea.  I have independently reviewed the smart card down load and discussed with  the patient the download data and encouarged the patient to continue to use the device.The residual AHI is borderline acceptable. The device is benefiting the patient and the device is medically necessary.  Without proper control of sleep apnea and good compliance there is a increased risk for hypertension, diabetes mellitus and nonrestorative sleep with hypersomnia which can increase risk for motor vehicle accidents.  Untreated sleep apnea is also a risk factor for development of atrial fibrillation, pulmonary hypertension, insulin resistance and stroke. The patient is also instructed to get the supplies from the [facility] and and change them on a regular basis.  A prescription for supplies has been sent to the [facility].  I have also discussed the good sleep hygiene habits and adequate amount of sleep needed for good health.  Obesity, with BMI is Body mass index is 31 kg/m².. Counseled weight loss will be beneficial for reduction in severity of sleep apnea, healthy diet/exercise to achieve same, follow up with primary care physician for serial monitoring and to further guide management.  Inadequate sleep hygiene [Z72.821]  Counseled the patient lifestyle modifications one goal morning rise time consistent 7 days per week for better quality nightly sleep. One goal today will be consistent morning rise time.  Chronic pain, not on opiate therapy not contributing to centrals that I can perceive. Has good plan in place with [facility].      Follow up in 4 months . Patient's questions were answered.        EMR Dragon/Transcription disclaimer:   Much of this encounter note is an electronic transcription/translation of spoken language to printed text. The electronic translation of spoken language may permit erroneous, or at times, nonsensical words or phrases to be inadvertently transcribed; Although I have reviewed the note for such errors, some may still exist.       NPI #: [id number]    Rosie Fajardo, DO  Sleep  [facility]  11/07/24

## 2024-12-12 RX ORDER — DULOXETIN HYDROCHLORIDE 30 MG/1
30 CAPSULE, DELAYED RELEASE ORAL DAILY
Qty: 30 CAPSULE | Refills: 0 | Status: SHIPPED | OUTPATIENT
Start: 2024-12-12 | End: 2024-12-13 | Stop reason: SDUPTHER

## 2024-12-13 RX ORDER — DULOXETIN HYDROCHLORIDE 30 MG/1
30 CAPSULE, DELAYED RELEASE ORAL DAILY
Qty: 90 CAPSULE | Refills: 0 | Status: SHIPPED | OUTPATIENT
Start: 2024-12-13

## 2024-12-26 DIAGNOSIS — F41.1 GAD (GENERALIZED ANXIETY DISORDER): ICD-10-CM

## 2024-12-26 DIAGNOSIS — U09.9 COVID-19 LONG HAULER: ICD-10-CM

## 2024-12-26 RX ORDER — DULOXETIN HYDROCHLORIDE 60 MG/1
CAPSULE, DELAYED RELEASE ORAL
Qty: 90 CAPSULE | Refills: 1 | OUTPATIENT
Start: 2024-12-26

## 2025-01-15 ENCOUNTER — TELEPHONE (OUTPATIENT)
Dept: FAMILY MEDICINE CLINIC | Facility: CLINIC | Age: 45
End: 2025-01-15

## 2025-01-15 NOTE — TELEPHONE ENCOUNTER
Caller: INA    Relationship: [employer]     Best call back number: [phone]    What was the call regarding: CALLED TO PROVIDE CONTACT INFORMATION SHOULD PCP NEED TO CONTACT  FOR NEEDS SUCH AS PRIOR AUTHORIZATION OR OTHER ASSISTANCE.

## 2025-02-01 DIAGNOSIS — E03.9 ACQUIRED HYPOTHYROIDISM: ICD-10-CM

## 2025-02-03 RX ORDER — LEVOTHYROXINE SODIUM 75 MCG
75 TABLET ORAL DAILY
Qty: 30 TABLET | Refills: 0 | Status: SHIPPED | OUTPATIENT
Start: 2025-02-03

## 2025-02-06 RX ORDER — METOPROLOL SUCCINATE 50 MG/1
50 TABLET, EXTENDED RELEASE ORAL DAILY
Qty: 90 TABLET | Refills: 3 | Status: SHIPPED | OUTPATIENT
Start: 2025-02-06

## 2025-02-18 ENCOUNTER — OFFICE VISIT (OUTPATIENT)
Dept: CARDIOLOGY | Facility: CLINIC | Age: 45
End: 2025-02-18
Payer: COMMERCIAL

## 2025-02-18 ENCOUNTER — LAB (OUTPATIENT)
Dept: LAB | Facility: HOSPITAL | Age: 45
End: 2025-02-18
Payer: COMMERCIAL

## 2025-02-18 VITALS
DIASTOLIC BLOOD PRESSURE: 64 MMHG | SYSTOLIC BLOOD PRESSURE: 110 MMHG | HEIGHT: 67 IN | HEART RATE: 88 BPM | BODY MASS INDEX: 27.94 KG/M2 | WEIGHT: 178 LBS

## 2025-02-18 DIAGNOSIS — U09.9 POST-COVID SYNDROME: Primary | ICD-10-CM

## 2025-02-18 DIAGNOSIS — U09.9 POST-COVID SYNDROME: ICD-10-CM

## 2025-02-18 LAB
CRP SERPL-MCNC: 0.51 MG/DL (ref 0.01–0.5)
ERYTHROCYTE [SEDIMENTATION RATE] IN BLOOD: 4 MM/HR (ref 0–20)

## 2025-02-18 PROCEDURE — 93000 ELECTROCARDIOGRAM COMPLETE: CPT | Performed by: INTERNAL MEDICINE

## 2025-02-18 PROCEDURE — 86141 C-REACTIVE PROTEIN HS: CPT

## 2025-02-18 PROCEDURE — 36415 COLL VENOUS BLD VENIPUNCTURE: CPT

## 2025-02-18 PROCEDURE — 85652 RBC SED RATE AUTOMATED: CPT

## 2025-02-18 PROCEDURE — 99214 OFFICE O/P EST MOD 30 MIN: CPT | Performed by: INTERNAL MEDICINE

## 2025-02-18 NOTE — PROGRESS NOTES
Date of Office Visit: 25  Encounter Provider: Shashi Walter MD  Place of Service: [facility] CARDIOLOGY  Patient Name: Ni Rodríguez  :1980  [id number]    Chief Complaint   Patient presents with    Rapid Heart Rate   :     HPI: Ni Rodríguez is a 44 y.o. female Who has had COVID-19 and been afflicted with long COVID syndrome.  She previously healthy woman got COVID and everything has kind of fallen apart she has migraines she has asthma she has lots of tachycardia she has been diagnosed as having long COVID.  She has gained about 30 to 40 pounds.  She had a normal coronary CT angiogram in 2023.  She has had normal echocardiography.  Holter monitors have been fairly unremarkable.  Looking back at her labs she has always had an elevated CRP and a mildly elevated sed rate.  I see 1 place where she had a high IgE level in 2023 she got put on Dupixent after that and does not have that followed up she had a normal interleukin-6 level.  She went to the [facility] they told her she had long COVID she may have pots disease.  They had tried her on Corlanor for her heart rate and she is also on Cymbalta.  She did not feel like it made much change and so when I saw her last month we tried her on some Ranexa and also Cardizem because I feel like she truly has angina times.  And that also has not helped.    She is here for follow-up and since I saw her last there is been some tremendous changes #1 she got placed on Zepbound a GLP-1 drug and has had a 50 pound weight loss that obviously makes life easier for her she feels better because of that and that helps her psyche tremendously.  She went for an intense 3-week period at the [facility] in [place] to try to help with management of her chronic pain that she has had since COVID-19 that has helped a lot it is given her things that she can help cope with however you know basically intellectually she is allowed to work  about 45 minutes at a time and then stop and she needs a break for a period of time same physically she can go about 30-35 minutes and she needs to stop and take a break so while she is improved and she is get some techniques for how to manage it and its allowed her to get off some of her medicines she still cannot work.  She from a cardiac standpoint is done better she is not having chest pain her heart rate is improved and her blood pressures low because probably the weight loss.    Past Medical History:   Diagnosis Date    Allergic     Anxiety 2020    TBI    Asthma     Bright red blood per rectum     COVID     Depression 2020    TBI    Disease of thyroid gland     Environmental allergies     History of medical problems 2022    [facility]    History of prior pregnancies      3    HL (hearing loss) 2018    Hypothyroidism unsure    Low back pain     Migraine     Miscarriage     X 1    URI (upper respiratory infection)        No past surgical history on file.    Social History     Socioeconomic History    Marital status:     Number of children: 2    Years of education: masters in education   Tobacco Use    Smoking status: Never    Smokeless tobacco: Never   Vaping Use    Vaping status: Never Used   Substance and Sexual Activity    Alcohol use: Not Currently     Alcohol/week: 2.0 standard drinks of alcohol     Comment: occassionally    Drug use: No    Sexual activity: Yes     Partners: Male     Birth control/protection: Condom       Family History   Problem Relation Age of Onset    Arthritis Mother         Osteoarthritis    Thyroid disease Mother     Anxiety disorder Mother     Hearing loss Mother     Vision loss Mother     Hypertension Father     Pancreatitis Father         GB complication - 1/2 removed    Hyperlipidemia Father     Diabetes Father     Cancer Father         pancreatic cancer    Hypothyroidism Brother     Depression Brother     Uterine cancer Maternal Grandmother     Hearing loss  Maternal Grandmother     Alzheimer's disease Maternal Grandfather     Prostate cancer Maternal Grandfather     Arthritis Paternal Grandmother         Osteoarthritis    Prostate cancer Paternal Grandfather     Hearing loss Maternal Aunt        Review of Systems   Constitutional: Negative for decreased appetite, fever, malaise/fatigue and weight loss.   HENT:  Negative for nosebleeds.    Eyes:  Negative for double vision.   Cardiovascular:  Negative for chest pain, claudication, cyanosis, dyspnea on exertion, irregular heartbeat, leg swelling, near-syncope, orthopnea, palpitations, paroxysmal nocturnal dyspnea and syncope.   Respiratory:  Negative for cough, hemoptysis and shortness of breath.    Hematologic/Lymphatic: Negative for bleeding problem.   Skin:  Negative for rash.   Musculoskeletal:  Negative for falls and myalgias.   Gastrointestinal:  Negative for hematochezia, jaundice, melena, nausea and vomiting.   Genitourinary:  Negative for hematuria.   Neurological:  Negative for dizziness and seizures.   Psychiatric/Behavioral:  Negative for altered mental status and memory loss.        Allergies   Allergen Reactions    Bee Venom Hives, Itching and Other (See Comments)     Localized reaction redness    Wasp Venom Protein Hives and Itching     Localized reaction redness         Current Outpatient Medications:     albuterol sulfate  (90 Base) MCG/ACT inhaler, INHALE 2 PUFFS EVERY 4 HOURS AS NEEDED FOR WHEEZING OR SHORTNESS OF AIR, Disp: 18 g, Rfl: 3    Auvi-Q 0.3 MG/0.3ML solution auto-injector injection, , Disp: , Rfl:     azelastine (ASTELIN) 0.1 % nasal spray, , Disp: , Rfl:     Breztri Aerosphere 160-9-4.8 MCG/ACT aerosol inhaler, , Disp: , Rfl:     budesonide (PULMICORT) 0.5 MG/2ML nebulizer solution, , Disp: , Rfl:     drospirenone-ethinyl estradiol (MATT,OCELLA) 3-0.03 MG per tablet, TAKE 1 TABLET BY MOUTH EVERY DAY, Disp: 84 tablet, Rfl: 0    DULoxetine (CYMBALTA) 60 MG capsule, Take once a day  "with the 30 mg of Cymbalta, Disp: 90 capsule, Rfl: 1    Dupixent 300 MG/2ML solution pen-injector, , Disp: , Rfl:     fexofenadine (ALLEGRA) 180 MG tablet, Take 1 tablet by mouth Daily., Disp: , Rfl:     ipratropium-albuterol (DUO-NEB) 0.5-2.5 mg/3 ml nebulizer, Take 3 mL by nebulization Every 4 (Four) Hours As Needed for Wheezing or Shortness of Air., Disp: 360 mL, Rfl: 2    metoprolol succinate XL (TOPROL-XL) 50 MG 24 hr tablet, TAKE 1 TABLET BY MOUTH EVERY DAY, Disp: 90 tablet, Rfl: 3    montelukast (SINGULAIR) 10 MG tablet, Take 1 tablet by mouth every night at bedtime., Disp: , Rfl:     OnabotulinumtoxinA 200 units reconstituted solution, Inject 200 Units into the appropriate muscle as directed by prescriber., Disp: , Rfl:     Synthroid 75 MCG tablet, TAKE 1 TABLET BY MOUTH EVERY DAY, Disp: 30 tablet, Rfl: 0    Zepbound 5 MG/0.5ML solution auto-injector, Inject 0.5 mL under the skin into the appropriate area as directed 1 (One) Time Per Week. Please see attached for detailed directions, Disp: , Rfl:     Ascorbic Acid (Vitamin C) 500 MG capsule, , Disp: , Rfl:       Objective:     Vitals:    02/18/25 1359   BP: 110/64   Pulse: 88   Weight: 80.7 kg (178 lb)   Height: 170.2 cm (67\")     Body mass index is 27.88 kg/m².    Constitutional:       Appearance: Well-developed.   Eyes:      General: No scleral icterus.  HENT:      Head: Normocephalic.   Neck:      Thyroid: No thyromegaly.      Vascular: No JVD.      Lymphadenopathy: No cervical adenopathy.   Pulmonary:      Effort: Pulmonary effort is normal.      Breath sounds: Normal breath sounds. No wheezing. No rales.   Cardiovascular:      Normal rate. Regular rhythm.      No gallop.    Edema:     Peripheral edema absent.   Abdominal:      Palpations: Abdomen is soft.      Tenderness: There is no abdominal tenderness.   Musculoskeletal: Normal range of motion. Skin:     General: Skin is warm and dry.      Findings: No rash.   Neurological:      Mental Status: Alert " and oriented to person, place, and time.           ECG 12 Lead    Date/Time: 2/18/2025 2:57 PM  Performed by: Shashi Walter MD    Authorized by: Shashi Walter MD  Comparison: compared with previous ECG   Rhythm: sinus rhythm    Clinical impression: normal ECG           Assessment:       Diagnosis Plan   1. Post-COVID syndrome                 Plan:       I be excited for Ni that she has lost this weight I think she will do better in the long run with a.  She still does have a lot of symptoms of long COVID.  Although when I listen to her heart today it is definitely slower than it was before and she does have a little bit of sinus arrhythmia which is great because she never did before I am going to asked that we check a sed rate and a high-sensitivity CRP on her today just to see if they are still elevated I will be surprised if they are normal but I do not think that she is recovered from her COVID-19 and with her coping techniques for her chronic pain she really cannot work more than 30 minutes or so at a time with out taking a break and from a cognitive standpoint cannot really do anything for more than 45 minutes at a time.  She should still be considered completely disabled. She is definitely not a malinger. She would love to be back and feel normal.  I will have her come back and see us in 6 months but we likely are going to try to wean her beta-blocker down or off some next couple of months    No follow-ups on file.     As always, it has been a pleasure to participate in your patient's care.      Sincerely,       Shashi Walter MD

## 2025-03-06 ENCOUNTER — OFFICE VISIT (OUTPATIENT)
Dept: SLEEP MEDICINE | Facility: HOSPITAL | Age: 45
End: 2025-03-06
Payer: COMMERCIAL

## 2025-03-06 VITALS
SYSTOLIC BLOOD PRESSURE: 93 MMHG | DIASTOLIC BLOOD PRESSURE: 65 MMHG | BODY MASS INDEX: 27.47 KG/M2 | HEIGHT: 67 IN | OXYGEN SATURATION: 100 % | HEART RATE: 89 BPM | WEIGHT: 175 LBS

## 2025-03-06 DIAGNOSIS — G47.33 OBSTRUCTIVE SLEEP APNEA, ADULT: Primary | ICD-10-CM

## 2025-03-06 DIAGNOSIS — E66.3 OVERWEIGHT WITH BODY MASS INDEX (BMI) OF 27 TO 27.9 IN ADULT: ICD-10-CM

## 2025-03-06 DIAGNOSIS — F41.1 GAD (GENERALIZED ANXIETY DISORDER): ICD-10-CM

## 2025-03-06 PROCEDURE — G0463 HOSPITAL OUTPT CLINIC VISIT: HCPCS

## 2025-03-06 NOTE — PROGRESS NOTES
[facility]  [address]  [address]  [place] [address]  Phone   Fax     SLEEP CLINIC FOLLOW UP PROGRESS NOTE.    Ni Rodríguez  [id number]   1980  44 y.o.  female      PCP: Sony Lovett MD      Date of visit: 3/6/2025    Chief Complaint   Patient presents with    Sleep Apnea       Medications and allergies are reviewed by me and documented in the encounter.     SOCIAL (habits pertaining to sleep medicine)  History tobacco use:No   History of alcohol use: 0 per week  Caffeine use: 2 beverages/d    HPI:  This is a 44 y.o. PMHx HERBERT. Here for managemetn of Obstructive Sleep Apnea (SENA 7.6 on 7/27/23 HST) Patient is using positive airway pressure therapy and the symptoms of sleep apnea have improved significantly on the therapy. Normally patient goes to bed at 1030 PM and wakes up at 745 AM .  The patient wakes up 3-5 time(s) during the night and has no problem going back to sleep.  Feels refreshed after waking up.     Overall patient's Impression of their PAP therapy is: Doing great since last visit pressure changes and new mask  Loves airfit f20 full face mask   Some sheets covering intentional air leak brenda some nights which she endorses and consistent with what I see on compliance data   No air pressure issues  Not on opiate therapy now nor was she on opiate therapy last clinic visit     Compliance data as reviewed by me with patient room today:  Date range 12/3/2024 - 3/2/2025  Overall use 90%  4 hour use 88%  Average days used 7 hours 57 minutes  Device AirSense 11 AutoSet  Settings 8-12.4 cm H2O  EPR off  95th percentile pressure is 11.6 cm H2O  Maximum pressures 12.0 cm H2O  95th percentile leak is 2.5 LPM - low intentional air leak see HPI  AHI 1.3-at goal (significantly improved from 4.6)  (PAP therapy data ResMed algorithm for events breakdown central 0.8  0 minutes 0% obstructive 0.2 unknown 0 RERA 0)  Mask: AirFit F20 FFM  Prior mask  used:  -FFM  unspefied covers just the bottom tip of her nose she is unable to tell me brand  - not airtfit f20/vitera/orairtouch - too many centrals with this mask  DME: [employer]     -HERBERT  Doing well today   Compliant with home therapies reviewed    -Overweight  On zepbound by her other physicians  Denies any personal or family hx of MEN II or MTC  If she sleeps without PAP she will have apneic events, non-restorative sleep, excessive daytime sleepiness, with PAP she feels great   Body mass index is 27.4 kg/m².  Wt Readings from Last 3 Encounters:   03/06/25 79.4 kg (175 lb)   02/18/25 80.7 kg (178 lb)   11/07/24 89.8 kg (198 lb)     -RLS screening: not meeting criteria for RLS  More positional   Infrequent non-weekly concern for this positional discomfort  She is on a betablocker but takes it the morning     -VS in sleep clinic 93/65  SD 89  She's on lopressor she takes qam by cardiology  She states she feels great today  No near syncope/syncope/lightheadedness/dizziness      -Last seen in sleep clinic ~4 months ago on 11/7/2024 DME  [employer], AHI 4.6 centrals were 3.7  0 with mask issue FFM  unspefied covers just the bottom tip of her nose she is unable to tell me brand  - not airtfit f20/vitera/orairtouch - too many centrals with this mask, turned EPR from 3 t off, ordered AirFit F20 FFM    REVIEW OF SYSTEMS:   Is negative unless otherwise noted in HPI  Stebbins Sleepiness Scale :Total score: 4     Disclaimer History: The above history is based on this sleep physician's in room encounter with the patient. Pre encounter self administered questionnaires are taken into consideration and discussed with patient for any discordance. The above documentation by this sleep physician is the most accurate clinical information determined by in room sleep physician encounter with patient.     PHYSICAL EXAMINATION:  Vitals:    03/06/25 0900   BP: 93/65   Pulse: 89   SpO2: 100%   Weight: 79.4 kg (175 lb)   Height: 170.2 cm  "(67.01\")    Body mass index is 27.4 kg/m².   CONSTITUTIONAL: Well appearing, in no overt pain or respiratory distress   ENT: Seated Mallampati is 1 but her lynn tongue position is IV  RESP SYSTEM:  No overt respiratory distress, speaks in clear sentences without dyspnea, no accessory muscle use  CARDIOVASULAR: No edema noted  NEURO: Oriented x 3, no gross focal deficits   Psychiatric: Very pleasant, her affect is full range and appropriate for visit, goal oriented and motivated towards self care    Compliance data as reviewed by me with patient room today:  Date range 12/3/2024 - 3/2/2025  Overall use 90%  4 hour use 88%  Average days used 7 hours 57 minutes  Device AirSense 11 AutoSet  Settings 8-12.4 cm H2O  EPR off  95th percentile pressure is 11.6 cm H2O  Maximum pressures 12.0 cm H2O  95th percentile leak is 2.5 LPM - low intentional air leak see HPI  AHI 1.3-at goal (significantly improved from 4.6)  (PAP therapy data ResMed algorithm for events breakdown central 0.8  0 minutes 0% obstructive 0.2 unknown 0 RERA 0)  Mask: AirFit F20 FFM  Prior mask used:  -FFM  unspefied covers just the bottom tip of her nose she is unable to tell me brand  - not airtfit f20/vitera/orairtouch - too many centrals with this mask  DME: [employer]     ASSESSMENT AND PLAN:  Obstructive sleep apnea ( G 47.33).    -Specific Changes made by me today:  I. After review of compliance data, in visit clinical correlation, and through shared decision making: will not make any changes to PAP therapy settings.  II. Counseled ensure nothing such as sheets/pillows etc covering intentional air leak valve on off take of airfit f20 FFM.  III.   I gave her options to push back visit to annual 6 months or 4 months she chose 4 months which is more than reasonable for close observation of PAP therapy with her intentional weight loss and will continue to screen her for RLS. Counseled patient to follow-up with me in 4 months for therapy review.  " Counseled may request sooner follow-up to sleep clinic anytime the patient feels necessary.  The symptoms of sleep apnea have improved with the device and the treatment.  Patient's compliance with the device is excellent for treatment of sleep apnea.  I have independently reviewed the smart card down load and discussed with the patient the download data and encouarged the patient to continue to use the device.The residual AHI is acceptable. The device is benefiting the patient and the device is medically necessary.  Without proper control of sleep apnea and good compliance there is a increased risk for hypertension, diabetes mellitus and nonrestorative sleep with hypersomnia which can increase risk for motor vehicle accidents.  Untreated sleep apnea is also a risk factor for development of atrial fibrillation, pulmonary hypertension, insulin resistance and stroke. The patient is also instructed to get the supplies from the [facility] and and change them on a regular basis.  A prescription for supplies has been sent to the [facility].  I have also discussed the good sleep hygiene habits and adequate amount of sleep needed for good health.  Overweight, with BMI is Body mass index is 27.4 kg/m².. We talked about potential retesting with intentional weight loss in the future and plan for same in this visit not quite there yet will monitor her for same. Counseled weight loss will be beneficial for reduction in severity of sleep apnea, healthy diet/exercise to achieve same, follow up with primary care physician for serial monitoring and to further guide management.   HERBERT, Compliant with home therapy reviewed. Counseled patient PAP therapy compliance for treatment of sleep apnea potentially beneficial towards comorbid mood disorder. Follow up with treating clinician for mood disorder as previous.    Follow up in 4 months . Patient's questions were answered.        EMR Dragon/Transcription disclaimer:   Much of this  encounter note is an electronic transcription/translation of spoken language to printed text. The electronic translation of spoken language may permit erroneous, or at times, nonsensical words or phrases to be inadvertently transcribed; Although I have reviewed the note for such errors, some may still exist.       NPI #: [id number]    Rosie Fajardo DO  Sleep Medicine  [facility]  03/06/25

## 2025-03-24 RX ORDER — METOPROLOL SUCCINATE 25 MG/1
25 TABLET, EXTENDED RELEASE ORAL DAILY
Qty: 90 TABLET | Refills: 3 | Status: SHIPPED | OUTPATIENT
Start: 2025-03-24

## 2025-08-15 ENCOUNTER — OFFICE VISIT (OUTPATIENT)
Dept: SLEEP MEDICINE | Facility: HOSPITAL | Age: 45
End: 2025-08-15
Payer: COMMERCIAL

## 2025-08-15 VITALS
HEART RATE: 82 BPM | BODY MASS INDEX: 29.51 KG/M2 | WEIGHT: 188 LBS | OXYGEN SATURATION: 99 % | SYSTOLIC BLOOD PRESSURE: 97 MMHG | DIASTOLIC BLOOD PRESSURE: 67 MMHG | HEIGHT: 67 IN

## 2025-08-15 DIAGNOSIS — G25.81 RESTLESS LEGS SYNDROME (RLS): ICD-10-CM

## 2025-08-15 DIAGNOSIS — G47.00 INSOMNIA, UNSPECIFIED TYPE: ICD-10-CM

## 2025-08-15 DIAGNOSIS — G47.33 OBSTRUCTIVE SLEEP APNEA SYNDROME IN ADULT: Primary | ICD-10-CM

## 2025-08-15 PROCEDURE — G0463 HOSPITAL OUTPT CLINIC VISIT: HCPCS

## 2025-08-19 ENCOUNTER — OFFICE VISIT (OUTPATIENT)
Age: 45
End: 2025-08-19
Payer: COMMERCIAL

## 2025-08-19 VITALS
SYSTOLIC BLOOD PRESSURE: 102 MMHG | WEIGHT: 191 LBS | BODY MASS INDEX: 29.98 KG/M2 | HEIGHT: 67 IN | HEART RATE: 97 BPM | DIASTOLIC BLOOD PRESSURE: 66 MMHG

## 2025-08-19 DIAGNOSIS — U09.9 POST-COVID CHRONIC DYSPNEA: ICD-10-CM

## 2025-08-19 DIAGNOSIS — G90.9 AUTONOMIC DYSFUNCTION: ICD-10-CM

## 2025-08-19 DIAGNOSIS — R06.09 POST-COVID CHRONIC DYSPNEA: ICD-10-CM

## 2025-08-19 DIAGNOSIS — R00.0 TACHYCARDIA: Primary | ICD-10-CM

## 2025-08-19 PROCEDURE — 93000 ELECTROCARDIOGRAM COMPLETE: CPT | Performed by: INTERNAL MEDICINE

## 2025-08-19 PROCEDURE — 99214 OFFICE O/P EST MOD 30 MIN: CPT | Performed by: INTERNAL MEDICINE
